# Patient Record
Sex: FEMALE | Race: OTHER | NOT HISPANIC OR LATINO
[De-identification: names, ages, dates, MRNs, and addresses within clinical notes are randomized per-mention and may not be internally consistent; named-entity substitution may affect disease eponyms.]

---

## 2017-01-26 ENCOUNTER — APPOINTMENT (OUTPATIENT)
Dept: PULMONOLOGY | Facility: CLINIC | Age: 42
End: 2017-01-26

## 2017-01-26 VITALS
HEART RATE: 89 BPM | DIASTOLIC BLOOD PRESSURE: 70 MMHG | OXYGEN SATURATION: 100 % | SYSTOLIC BLOOD PRESSURE: 126 MMHG | WEIGHT: 261 LBS | RESPIRATION RATE: 16 BRPM | BODY MASS INDEX: 44.56 KG/M2 | HEIGHT: 64 IN

## 2017-04-26 ENCOUNTER — APPOINTMENT (OUTPATIENT)
Dept: PULMONOLOGY | Facility: CLINIC | Age: 42
End: 2017-04-26

## 2017-06-29 ENCOUNTER — APPOINTMENT (OUTPATIENT)
Dept: PULMONOLOGY | Facility: CLINIC | Age: 42
End: 2017-06-29

## 2017-06-29 VITALS
DIASTOLIC BLOOD PRESSURE: 80 MMHG | HEART RATE: 86 BPM | HEIGHT: 64 IN | SYSTOLIC BLOOD PRESSURE: 100 MMHG | RESPIRATION RATE: 16 BRPM | BODY MASS INDEX: 41.15 KG/M2 | OXYGEN SATURATION: 99 % | WEIGHT: 241 LBS

## 2017-06-29 DIAGNOSIS — R73.09 OTHER ABNORMAL GLUCOSE: ICD-10-CM

## 2017-06-29 RX ORDER — CLONAZEPAM 0.5 MG/1
0.5 TABLET ORAL
Qty: 60 | Refills: 0 | Status: DISCONTINUED | COMMUNITY
Start: 2017-03-02

## 2017-09-17 ENCOUNTER — TRANSCRIPTION ENCOUNTER (OUTPATIENT)
Age: 42
End: 2017-09-17

## 2017-09-22 ENCOUNTER — APPOINTMENT (OUTPATIENT)
Dept: PULMONOLOGY | Facility: CLINIC | Age: 42
End: 2017-09-22
Payer: COMMERCIAL

## 2017-09-22 VITALS
OXYGEN SATURATION: 99 % | HEIGHT: 64 IN | HEART RATE: 90 BPM | SYSTOLIC BLOOD PRESSURE: 123 MMHG | DIASTOLIC BLOOD PRESSURE: 85 MMHG | RESPIRATION RATE: 16 BRPM | BODY MASS INDEX: 41.15 KG/M2 | WEIGHT: 241 LBS

## 2017-09-22 DIAGNOSIS — R59.1 GENERALIZED ENLARGED LYMPH NODES: ICD-10-CM

## 2017-09-22 PROCEDURE — 94010 BREATHING CAPACITY TEST: CPT

## 2017-09-22 PROCEDURE — 99214 OFFICE O/P EST MOD 30 MIN: CPT | Mod: 25

## 2018-01-15 ENCOUNTER — APPOINTMENT (OUTPATIENT)
Dept: PULMONOLOGY | Facility: CLINIC | Age: 43
End: 2018-01-15
Payer: COMMERCIAL

## 2018-01-15 VITALS — OXYGEN SATURATION: 100 % | HEART RATE: 84 BPM | SYSTOLIC BLOOD PRESSURE: 135 MMHG | DIASTOLIC BLOOD PRESSURE: 86 MMHG

## 2018-01-15 PROCEDURE — 99214 OFFICE O/P EST MOD 30 MIN: CPT | Mod: 25

## 2018-01-15 PROCEDURE — 94010 BREATHING CAPACITY TEST: CPT

## 2018-01-15 RX ORDER — AZITHROMYCIN 500 MG/1
500 TABLET, FILM COATED ORAL
Qty: 5 | Refills: 0 | Status: DISCONTINUED | COMMUNITY
Start: 2017-12-20 | End: 2018-01-15

## 2018-01-15 RX ORDER — AMOXICILLIN AND CLAVULANATE POTASSIUM 500; 125 MG/1; MG/1
500-125 TABLET, FILM COATED ORAL
Qty: 28 | Refills: 0 | Status: DISCONTINUED | COMMUNITY
Start: 2017-09-22 | End: 2018-01-15

## 2018-01-15 RX ORDER — FLUCONAZOLE 100 MG/1
100 TABLET ORAL
Qty: 14 | Refills: 0 | Status: DISCONTINUED | COMMUNITY
Start: 2017-09-22 | End: 2018-01-15

## 2018-07-05 ENCOUNTER — APPOINTMENT (OUTPATIENT)
Dept: PULMONOLOGY | Facility: CLINIC | Age: 43
End: 2018-07-05
Payer: COMMERCIAL

## 2018-07-05 ENCOUNTER — NON-APPOINTMENT (OUTPATIENT)
Age: 43
End: 2018-07-05

## 2018-07-05 VITALS
BODY MASS INDEX: 40.97 KG/M2 | HEIGHT: 64 IN | HEART RATE: 86 BPM | OXYGEN SATURATION: 100 % | WEIGHT: 240 LBS | SYSTOLIC BLOOD PRESSURE: 130 MMHG | RESPIRATION RATE: 16 BRPM | DIASTOLIC BLOOD PRESSURE: 80 MMHG

## 2018-07-05 PROCEDURE — 94010 BREATHING CAPACITY TEST: CPT

## 2018-07-05 PROCEDURE — 99214 OFFICE O/P EST MOD 30 MIN: CPT | Mod: 25

## 2018-08-02 ENCOUNTER — OUTPATIENT (OUTPATIENT)
Dept: OUTPATIENT SERVICES | Facility: HOSPITAL | Age: 43
LOS: 1 days | End: 2018-08-02
Payer: COMMERCIAL

## 2018-08-02 ENCOUNTER — RESULT REVIEW (OUTPATIENT)
Age: 43
End: 2018-08-02

## 2018-08-02 DIAGNOSIS — K21.9 GASTRO-ESOPHAGEAL REFLUX DISEASE WITHOUT ESOPHAGITIS: ICD-10-CM

## 2018-08-02 PROCEDURE — 88305 TISSUE EXAM BY PATHOLOGIST: CPT | Mod: 26

## 2018-08-02 PROCEDURE — 88312 SPECIAL STAINS GROUP 1: CPT | Mod: 26

## 2018-08-02 PROCEDURE — 88305 TISSUE EXAM BY PATHOLOGIST: CPT

## 2018-08-02 PROCEDURE — 43239 EGD BIOPSY SINGLE/MULTIPLE: CPT

## 2018-08-02 PROCEDURE — 88312 SPECIAL STAINS GROUP 1: CPT

## 2018-08-03 LAB — SURGICAL PATHOLOGY STUDY: SIGNIFICANT CHANGE UP

## 2018-12-28 ENCOUNTER — RX RENEWAL (OUTPATIENT)
Age: 43
End: 2018-12-28

## 2018-12-31 ENCOUNTER — RX RENEWAL (OUTPATIENT)
Age: 43
End: 2018-12-31

## 2019-01-02 ENCOUNTER — RX RENEWAL (OUTPATIENT)
Age: 44
End: 2019-01-02

## 2019-01-08 ENCOUNTER — APPOINTMENT (OUTPATIENT)
Dept: INTERVENTIONAL RADIOLOGY/VASCULAR | Facility: CLINIC | Age: 44
End: 2019-01-08
Payer: COMMERCIAL

## 2019-01-08 VITALS
BODY MASS INDEX: 40.12 KG/M2 | DIASTOLIC BLOOD PRESSURE: 66 MMHG | OXYGEN SATURATION: 99 % | HEIGHT: 64 IN | WEIGHT: 235 LBS | HEART RATE: 85 BPM | SYSTOLIC BLOOD PRESSURE: 129 MMHG | TEMPERATURE: 98.6 F

## 2019-01-08 DIAGNOSIS — Z78.9 OTHER SPECIFIED HEALTH STATUS: ICD-10-CM

## 2019-01-08 PROCEDURE — 99205 OFFICE O/P NEW HI 60 MIN: CPT

## 2019-01-08 RX ORDER — BECLOMETHASONE DIPROPIONATE HFA 80 UG/1
80 AEROSOL, METERED RESPIRATORY (INHALATION) TWICE DAILY
Qty: 3 | Refills: 1 | Status: DISCONTINUED | COMMUNITY
Start: 2018-12-28 | End: 2019-01-08

## 2019-01-08 RX ORDER — PREDNISONE 10 MG/1
10 TABLET ORAL
Qty: 100 | Refills: 1 | Status: DISCONTINUED | COMMUNITY
Start: 2018-12-28 | End: 2019-01-08

## 2019-01-08 RX ORDER — BECLOMETHASONE DIPROPIONATE 40 UG/1
40 AEROSOL, METERED NASAL
Qty: 3 | Refills: 1 | Status: DISCONTINUED | COMMUNITY
Start: 2017-01-26 | End: 2019-01-08

## 2019-01-09 ENCOUNTER — NON-APPOINTMENT (OUTPATIENT)
Age: 44
End: 2019-01-09

## 2019-01-09 ENCOUNTER — APPOINTMENT (OUTPATIENT)
Dept: PULMONOLOGY | Facility: CLINIC | Age: 44
End: 2019-01-09
Payer: COMMERCIAL

## 2019-01-09 VITALS
HEIGHT: 65 IN | WEIGHT: 245 LBS | HEART RATE: 81 BPM | DIASTOLIC BLOOD PRESSURE: 79 MMHG | SYSTOLIC BLOOD PRESSURE: 127 MMHG | BODY MASS INDEX: 40.82 KG/M2 | RESPIRATION RATE: 17 BRPM | OXYGEN SATURATION: 99 %

## 2019-01-09 PROCEDURE — 94010 BREATHING CAPACITY TEST: CPT

## 2019-01-09 PROCEDURE — 99214 OFFICE O/P EST MOD 30 MIN: CPT | Mod: 25

## 2019-01-09 NOTE — HISTORY OF PRESENT ILLNESS
[FreeTextEntry1] : Ms. Edwards is a 43 year old female with a history of allergic rhinitis, severe persistent asthma, GERD, eosinophilia, nocturia, sleep apnea, SOB and vitamin D deficiency presenting to the office today for a follow up visit. Her chief complaint is sinus issues. \par -she states that she is just now getting over a cold. she has been sweating from the steroids \par -she notes that her heartburn has been under control\par -she reports that the steroids have been disturbing her sleep schedule, and she has been very fatigued as a result\par -she notes that before getting sick, she had been feeling generally well\par -she reports that her weight has gone up slightly as a result of the steroids\par -she notes that she has had a constant headache in her sinus area\par -she has been using her CPAP regularly and benefitting from it \par -her sinuses have been hurting\par -she denies any headaches, nausea, vomiting, fever, chills, chest pain, chest pressure, diarrhea, constipation, dysphagia, dizziness, leg swelling, leg pain, itchy eyes, itchy ears, heartburn, reflux, or sour taste in the mouth.

## 2019-01-09 NOTE — PHYSICAL EXAM

## 2019-01-09 NOTE — PROCEDURE
[FreeTextEntry1] : PFT - spi reveals moderate restrictive dysfunction ; FEV1 is 2.13 which is 69% of predicted, normal flow volume loop \par \par Blood work (12/14/18) \par eosinophils - 8\par Hb - 8.8\par hematocrit - 29

## 2019-01-09 NOTE — REASON FOR VISIT
[Follow-Up] : a follow-up visit [FreeTextEntry1] : allergic rhinitis, severe persistent asthma, GERD, eosinophilia, nocturia, sleep apnea, SOB and vitamin D deficiency

## 2019-01-09 NOTE — ADDENDUM
[FreeTextEntry1] : All medical record entries made by miguel Guillen were at Dr. Rodrigue Elizabeth's, direction and personally dictated by me on 01/09/2019. I have reviewed the chart and agree that the record accurately reflects my personal performance of the history, physical exam, assessment and plan. I have also personally directed, reviewed, and agree with the discharge instructions.

## 2019-01-09 NOTE — ASSESSMENT
[FreeTextEntry1] : Ms. Edwards is a 43 year old female with a history of asthma / allergies / GERD / OSAS. \par \par Her SOB is due to: \par -anemia\par -asthma\par -obesity\par -poor mechanics of breathing \par -out of shape over weight\par \par Problem 1: Severe persistent asthma\par -controlled at this time \par -continue Symbicort 160 (2 puffs BID) \par -continue Qvar 80 (2 puffs BID) \par -continue Singulair 10 mg at bedtime \par -add DuoNeb via Hand held nebulizer, Q6H \par \par Asthma is believed to be caused by inherited (genetic) and environmental factor, but its exact cause is unknown. Asthma may be triggered by allergens, lung infections, or irritants in the air. Asthma triggers are different for each person \par -Inhaler technique reviewed as well as oral hygiene techniques reviewed with patient. Avoidance of cold air, extremes of temperature, rescue inhaler should be used before exercise. Order of medication reviewed with patient. Recommended use of a cool mist humidifier in the bedroom \par \par problem 2: overweight\par Weight loss, exercise, and diet control were discussed and are highly encouraged. Treatment options were given such as, aqua therapy, and contacting a nutritionist. Recommended to use the elliptical, stationary bike, less use of treadmill. Mindful eating was explained to the patient Obesity is associated with worsening asthma, shortness of breath, and potential for cardiac disease, diabetes, and other underlying medical conditions. \par \par problem 3: poor breathing mechanics\par -Proper breathing techniques were reviewed with an emphasis of exhalation. Patient instructed to breath in for 1 second and out for four seconds. Patient was encouraged to not talk while walking.\par \par Problem 4: Anemia \par -Hematin Anemia guard QD\par \par Problem 5: allergies/sinus\par -continue Qnasl 1 sniff BID \par -recommended to use "Navage" nasal rinse device \par -continue Astelin 0.15 1 sniff BID\par \par Environmental measures for allergies were encouraged including mattress and pillow cover, air purifier, and environmental controls.\par \par Problem 6:  GERD\par -continue Nexium 40 mg QD at breakfast \par -Rule of 2s: avoid eating too much, eating too late, eating too spicy, eating two hours before bed\par -Things to avoid including overeating, spicy foods, tight clothing, eating within three hours of bed, this list is not all inclusive. \par -For treatment of reflux, possible options discussed including diet control, H2 blockers, PPIs, as well as coating motility agents discussed as treatment options. Timing of meals and proximity of last meal to sleep were discussed. If symptoms persist, a formal gastrointestinal evaluation is needed. \par \par Problem 7: low vitamin D\par -Continue on supplemental vitamin D \par \par Problem 8: OSAS\par -continue to use CPAP every night, benefiting well \par - -Discussed the risks/associations with coronary artery disease, atrial fibrillation, arrhythmia, memory loss, issues with concentration, stroke risk, hypertension, nocturia, chronic reflux/Ferrara’s esophagus some but not all inclusive. Treatment options discussed including CPAP/BiPAP machine, oral appliance, ProVent therapy, Oxy-Aid by Respitec, new technologies, or positional sleep.\par \par problem 9: Blood work\par -resend for blood work\par -Vitamin D \par -Eosinophil level (-)\par -IgG subsets\par -Quant immunoglobulins\par -Strep/ pneumonia titers \par -iron level\par -Thyroid levels \par -checking for candidacy for Nucala injections\par \par problem 10: elevated eosinophil level (not elevated) \par -candidate for Nucala or Faserna\par \par -The safety and efficacy of Nucala was established in three double-blind, randomized, placebo-controlled trials in patients with severe asthma. Compared to a placebo, patients with severe asthma receiving Nucala had fewer exacerbation requiring hospitalization and/or emergency department visits, and a longer time to first exacerbation. In addition, patients with severe asthma receiving Nucala experienced greater reductions in their daily maintenance oral corticosteroid dose, while maintaining asthma control compared with patients receiving placebo. Treatment with Nucala did not result in a significant improvement in lung function, as measured by the volume of air exhaled by patients in one second. The most common side effects include: headache, injection site reactions, back pain, weakness, and fatigue; hypersensitivity reactions can occur within hours or days including swelling of the face, mouth, and tongue, fainting, dizziness, hives, breathing problems, and rash; herpes zoster infections have occurred. The drug is a monoclonal antibody that inhibits interleukin-5 which helps regular eosinophils, a type of white blood cell that contributes to asthma. The over-production of eosinophils can cause inflammation in the lungs, increasing the frequency of asthma attacks. Patients must also take other medications, including high dose inhaled corticosteroids and at least one additional asthma drug.\par \par problem 11: elevated IgE level\par -failed Xolair\par \par problem 12: health maintenance \par -recommended yearly flu shot after October 15 - 2018\par -recommended strep pneumonia vaccines: Prevnar-13 vaccine, followed by Pneumo vaccine 23 one year following\par -recommended early intervention for URIs\par -recommended regular osteoporosis evaluations\par -recommended early dermatological evaluations\par -recommended after the age of 50 to the age of 70, colonoscopy every 5 years \par Recommended that she see a Psychologist to help her with her get back to "herself" \par \par F/u in 3-4 months \par pt is encouraged to call or fax the office with any questions or concerns. \par Pt is to exercise and diet to promote a healthy weight. \par Explained to the pt in full detail with demonstrations how to use the inhalers and inhaler hygiene.

## 2019-01-16 ENCOUNTER — MEDICATION RENEWAL (OUTPATIENT)
Age: 44
End: 2019-01-16

## 2019-03-18 ENCOUNTER — APPOINTMENT (OUTPATIENT)
Dept: PULMONOLOGY | Facility: CLINIC | Age: 44
End: 2019-03-18
Payer: COMMERCIAL

## 2019-03-18 VITALS
WEIGHT: 241 LBS | RESPIRATION RATE: 17 BRPM | BODY MASS INDEX: 41.15 KG/M2 | HEART RATE: 90 BPM | HEIGHT: 64 IN | TEMPERATURE: 98.9 F | SYSTOLIC BLOOD PRESSURE: 136 MMHG | DIASTOLIC BLOOD PRESSURE: 72 MMHG | OXYGEN SATURATION: 98 %

## 2019-03-18 PROCEDURE — 99214 OFFICE O/P EST MOD 30 MIN: CPT

## 2019-03-18 RX ORDER — FLUTICASONE PROPIONATE 50 UG/1
50 SPRAY, METERED NASAL TWICE DAILY
Qty: 1 | Refills: 1 | Status: ACTIVE | COMMUNITY
Start: 2019-03-18 | End: 1900-01-01

## 2019-03-18 NOTE — PHYSICAL EXAM
[General Appearance - Well Developed] : well developed [Normal Appearance] : normal appearance [Well Groomed] : well groomed [General Appearance - Well Nourished] : well nourished [No Deformities] : no deformities [General Appearance - In No Acute Distress] : no acute distress [Eyelids - No Xanthelasma] : the eyelids demonstrated no xanthelasmas [Normal Conjunctiva] : the conjunctiva exhibited no abnormalities [III] : III [Neck Cervical Mass (___cm)] : no neck mass was observed [Neck Appearance] : the appearance of the neck was normal [Heart Rate And Rhythm] : heart rate and rhythm were normal [Heart Sounds] : normal S1 and S2 [Murmurs] : no murmurs present [Exaggerated Use Of Accessory Muscles For Inspiration] : no accessory muscle use [Respiration, Rhythm And Depth] : normal respiratory rhythm and effort [Abdomen Soft] : soft [Abdomen Tenderness] : non-tender [Abdomen Mass (___ Cm)] : no abdominal mass palpated [Gait - Sufficient For Exercise Testing] : the gait was sufficient for exercise testing [Abnormal Walk] : normal gait [Nail Clubbing] : no clubbing of the fingernails [Cyanosis, Localized] : no localized cyanosis [Petechial Hemorrhages (___cm)] : no petechial hemorrhages [] : no rash [Skin Color & Pigmentation] : normal skin color and pigmentation [Skin Lesions] : no skin lesions [No Venous Stasis] : no venous stasis [No Xanthoma] : no  xanthoma was observed [No Skin Ulcers] : no skin ulcer [Sensation] : the sensory exam was normal to light touch and pinprick [Deep Tendon Reflexes (DTR)] : deep tendon reflexes were 2+ and symmetric [Oriented To Time, Place, And Person] : oriented to person, place, and time [No Focal Deficits] : no focal deficits [Impaired Insight] : insight and judgment were intact [Affect] : the affect was normal [Erythema] : erythema of the pharynx [Arterial Pulses Normal] : the arterial pulses were normal [FreeTextEntry1] : I:E ratio 1:3; BL expiratory wheezes throughout, cannot take a deep breath without coughing [Mood] : the mood was normal

## 2019-03-18 NOTE — REASON FOR VISIT
[Acute] : an acute visit [Asthma] : asthma [Shortness of Breath] : shortness of Breath [Cough] : cough [Wheezing] : wheezing [FreeTextEntry2] : voice loss

## 2019-03-18 NOTE — HISTORY OF PRESENT ILLNESS
[FreeTextEntry1] : Ms. Edwards is a 43 year old female with a history of allergic rhinitis, severe persistent asthma, GERD, eosinophilia, nocturia, sleep apnea, SOB and vitamin D deficiency presenting to the office today for an acute visit for cough, voice loss, shortness of breath and fatigue. \par \par Pt reports getting sick last Tuesday with chills, headache and head congestion. She was tested for the flu at an urgent care and it was negative.  By Friday last week she lost her voice completely. Today her voice is just minimally back after voice rest, hydration, and recommendations by Dr. Elizabeth. \par \par Additionally she reports severe cough fits- dry, chest pressure/tightness, fatigue, decreased appetite, continued chills, throat pain/soreness, throat fullness, throat dryness, wheezing, sinus headache, sinus pressure, poor sleep due to coughing fits. \par \par She is currently on symbicort. She added in duoneb treatments and is not getting any relief with that.  She is using astelin for her sinus pressure.

## 2019-03-18 NOTE — REVIEW OF SYSTEMS
[Negative] : Pulmonary Hypertension [Chills] : chills [Poor Appetite] : poor appetite [Fatigue] : fatigue [Nasal Congestion] : nasal congestion [Sinus Problems] : sinus problems [Sore Throat] : sore throat [Dry Mouth] : dry mouth [Cough] : cough [Dyspnea] : dyspnea [Chest Tightness] : chest tightness [Wheezing] : wheezing [Headache] : headache [Difficulty Initiating Sleep] : difficulty falling asleep [As Noted in HPI] : as noted in HPI [Difficulty Maintaining Sleep] : difficulty maintaining sleep [Nonrestorative Sleep] : nonrestorative sleep [Fever] : no fever [Sputum] : not coughing up ~M sputum [de-identified] : pt with poor sleep due to coughing fits

## 2019-04-17 ENCOUNTER — APPOINTMENT (OUTPATIENT)
Dept: PULMONOLOGY | Facility: CLINIC | Age: 44
End: 2019-04-17
Payer: COMMERCIAL

## 2019-04-17 VITALS
DIASTOLIC BLOOD PRESSURE: 80 MMHG | BODY MASS INDEX: 41.48 KG/M2 | RESPIRATION RATE: 16 BRPM | HEART RATE: 84 BPM | OXYGEN SATURATION: 99 % | SYSTOLIC BLOOD PRESSURE: 120 MMHG | HEIGHT: 64 IN | WEIGHT: 243 LBS

## 2019-04-17 PROCEDURE — 94010 BREATHING CAPACITY TEST: CPT

## 2019-04-17 PROCEDURE — 94618 PULMONARY STRESS TESTING: CPT

## 2019-04-17 PROCEDURE — 94729 DIFFUSING CAPACITY: CPT

## 2019-04-17 PROCEDURE — 99214 OFFICE O/P EST MOD 30 MIN: CPT | Mod: 25

## 2019-04-17 NOTE — PHYSICAL EXAM
[General Appearance - Well Developed] : well developed [Normal Appearance] : normal appearance [Well Groomed] : well groomed [No Deformities] : no deformities [General Appearance - Well Nourished] : well nourished [Normal Conjunctiva] : the conjunctiva exhibited no abnormalities [General Appearance - In No Acute Distress] : no acute distress [III] : III [Eyelids - No Xanthelasma] : the eyelids demonstrated no xanthelasmas [Normal Oropharynx] : normal oropharynx [Neck Appearance] : the appearance of the neck was normal [Jugular Venous Distention Increased] : there was no jugular-venous distention [Neck Cervical Mass (___cm)] : no neck mass was observed [Thyroid Diffuse Enlargement] : the thyroid was not enlarged [Heart Rate And Rhythm] : heart rate and rhythm were normal [Heart Sounds] : normal S1 and S2 [Thyroid Nodule] : there were no palpable thyroid nodules [Murmurs] : no murmurs present [Exaggerated Use Of Accessory Muscles For Inspiration] : no accessory muscle use [Respiration, Rhythm And Depth] : normal respiratory rhythm and effort [Abdomen Tenderness] : non-tender [Abdomen Mass (___ Cm)] : no abdominal mass palpated [Abdomen Soft] : soft [Abnormal Walk] : normal gait [Gait - Sufficient For Exercise Testing] : the gait was sufficient for exercise testing [Nail Clubbing] : no clubbing of the fingernails [Cyanosis, Localized] : no localized cyanosis [Petechial Hemorrhages (___cm)] : no petechial hemorrhages [Skin Color & Pigmentation] : normal skin color and pigmentation [] : no rash [No Venous Stasis] : no venous stasis [Skin Lesions] : no skin lesions [No Xanthoma] : no  xanthoma was observed [No Skin Ulcers] : no skin ulcer [Deep Tendon Reflexes (DTR)] : deep tendon reflexes were 2+ and symmetric [Sensation] : the sensory exam was normal to light touch and pinprick [No Focal Deficits] : no focal deficits [Impaired Insight] : insight and judgment were intact [Affect] : the affect was normal [Oriented To Time, Place, And Person] : oriented to person, place, and time [FreeTextEntry1] : I:E ratio 1:3; mild forced expiratory wheeze

## 2019-04-17 NOTE — PROCEDURE
[FreeTextEntry1] : PFT- spi reveals normal flows; FEV1 was 2.16L which is 93% of predicted; normal lung volumes; mild to moderate reduction in diffusion at 16.4, which is 57% of predicted; normal flow volume loop \par \par Patient completed a 6-minute walk/exercise study in which the Lowest Pulse Ox was 92%; there was evidence of very slight dyspnea. The patient walked 30 laps or 489 meters \par \par FENO was 6; a normal value being less than 25\par Fractional exhaled nitric oxide (FENO) is regarded as a simple, noninvasive method for assessing eosinophilic airway inflammation. Produced by a variety of cells within the lung, nitric oxide (NO) concentrations are generally low in healthy individuals. However, high concentrations of NO appear to be involved in nonspecific host defense mechanisms and chronic inflammatory diseases such as asthma. The American Thor[employer] Society (ATS) therefore has recommended using FENO to aid in the diagnosis and monitoring of eosinophilic airway inflammation and asthma, and for identifying steroid responsive individuals whose chronic respiratory symptoms may be caused by airway inflammation.

## 2019-04-17 NOTE — ASSESSMENT
[FreeTextEntry1] : Ms. Edwards is a 43 year old female with a history of asthma, allergies, GERD, OSAS, and obsity. She presents to the office for pulmonary pre-op clearance for gynecological surgery. \par \par Her SOB is due to: \par -anemia\par -asthma\par -obesity\par -poor mechanics of breathing \par -out of shape over weight\par \par Problem 1: Severe persistent asthma\par -controlled at this time \par -continue Symbicort 160 (2 puffs BID) \par -continue Qvar 80 (2 puffs BID) \par -continue Singulair 10 mg at bedtime \par - Continue DuoNeb via Hand held nebulizer prn \par \par Asthma is believed to be caused by inherited (genetic) and environmental factor, but its exact cause is unknown. Asthma may be triggered by allergens, lung infections, or irritants in the air. Asthma triggers are different for each person \par -Inhaler technique reviewed as well as oral hygiene techniques reviewed with patient. Avoidance of cold air, extremes of temperature, rescue inhaler should be used before exercise. Order of medication reviewed with patient. Recommended use of a cool mist humidifier in the bedroom \par \par problem 2: overweight\par Weight loss, exercise, and diet control were discussed and are highly encouraged. Treatment options were given such as, aqua therapy, and contacting a nutritionist. Recommended to use the elliptical, stationary bike, less use of treadmill. Mindful eating was explained to the patient Obesity is associated with worsening asthma, shortness of breath, and potential for cardiac disease, diabetes, and other underlying medical conditions. \par \par problem 3: poor breathing mechanics\par -Proper breathing techniques were reviewed with an emphasis of exhalation. Patient instructed to breath in for 1 second and out for four seconds. Patient was encouraged to not talk while walking.\par \par Problem 4: Anemia \par -Hematin Anemia guard QD\par \par Problem 5: allergies/sinus\par -continue Qnasl 1 sniff BID \par -recommended to use "Navage" nasal rinse device \par -continue Astelin 0.15 1 sniff BID\par \par Environmental measures for allergies were encouraged including mattress and pillow cover, air purifier, and environmental controls.\par \par Problem 6:  GERD\par -continue Nexium 40 mg QD at breakfast \par -Rule of 2s: avoid eating too much, eating too late, eating too spicy, eating two hours before bed\par -Things to avoid including overeating, spicy foods, tight clothing, eating within three hours of bed, this list is not all inclusive. \par -For treatment of reflux, possible options discussed including diet control, H2 blockers, PPIs, as well as coating motility agents discussed as treatment options. Timing of meals and proximity of last meal to sleep were discussed. If symptoms persist, a formal gastrointestinal evaluation is needed. \par \par Problem 7: low vitamin D\par -Continue on supplemental vitamin D \par \par Problem 8: OSAS\par -continue to use CPAP every night, benefiting well \par - -Discussed the risks/associations with coronary artery disease, atrial fibrillation, arrhythmia, memory loss, issues with concentration, stroke risk, hypertension, nocturia, chronic reflux/Ferrara’s esophagus some but not all inclusive. Treatment options discussed including CPAP/BiPAP machine, oral appliance, ProVent therapy, Oxy-Aid by Respitec, new technologies, or positional sleep.\par \par problem 9: Pulmonary Pre-Op clearance for GYN surgery\par -at this point in time there are no absolute pulmonary contraindications to go forward with the planned procedure  \par -at the time of surgery s/he should have optimal pain control, incentive spirometry, early ambulation, DVT and GI prophylaxis. \par \par problem 10: elevated eosinophil level (not elevated) \par -candidate for Nucala or Faserna\par \par -The safety and efficacy of Nucala was established in three double-blind, randomized, placebo-controlled trials in patients with severe asthma. Compared to a placebo, patients with severe asthma receiving Nucala had fewer exacerbation requiring hospitalization and/or emergency department visits, and a longer time to first exacerbation. In addition, patients with severe asthma receiving Nucala experienced greater reductions in their daily maintenance oral corticosteroid dose, while maintaining asthma control compared with patients receiving placebo. Treatment with Nucala did not result in a significant improvement in lung function, as measured by the volume of air exhaled by patients in one second. The most common side effects include: headache, injection site reactions, back pain, weakness, and fatigue; hypersensitivity reactions can occur within hours or days including swelling of the face, mouth, and tongue, fainting, dizziness, hives, breathing problems, and rash; herpes zoster infections have occurred. The drug is a monoclonal antibody that inhibits interleukin-5 which helps regular eosinophils, a type of white blood cell that contributes to asthma. The over-production of eosinophils can cause inflammation in the lungs, increasing the frequency of asthma attacks. Patients must also take other medications, including high dose inhaled corticosteroids and at least one additional asthma drug.\par \par problem 11: elevated IgE level\par -failed Xolair\par \par problem 12: health maintenance \par -recommended yearly flu shot after October 15 - 2018\par -recommended strep pneumonia vaccines: Prevnar-13 vaccine, followed by Pneumo vaccine 23 one year following\par -recommended early intervention for URIs\par -recommended regular osteoporosis evaluations\par -recommended early dermatological evaluations\par -recommended after the age of 50 to the age of 70, colonoscopy every 5 years \par Recommended that she see a Psychologist to help her with her get back to "herself" \par \par F/u in 3-4 months \par pt is encouraged to call or fax the office with any questions or concerns. \par Pt is to exercise and diet to promote a healthy weight. \par Explained to the pt in full detail with demonstrations how to use the inhalers and inhaler hygiene.

## 2019-04-17 NOTE — HISTORY OF PRESENT ILLNESS
[FreeTextEntry1] : Ms. Edwards is a 43 year old female with a history of allergic rhinitis, severe persistent asthma, GERD, eosinophilia, nocturia, sleep apnea, SOB and vitamin D deficiency presenting to the office today for a follow up visit. Her chief complaint is gynecological issues. \par - She comes in following a recent URI for which she presented to the office and saw Alla Carrillo. She was prescribed a course of treatment (abx and steroid) and is currently feeling improved. \par - She describes her sleeping as okay. \par - She is using her CPAP regularly (7 hrs per night)\par - She states that her sinuses are okay\par - She believes that her flairs this year have been due to exposures at work. She has since changed her working environment\par - She feels that she has been ill too many times over the past year \par - She has gained about 7 pounds since being on prednisone\par - She currently feels okay with regards to allergies.

## 2019-04-19 ENCOUNTER — OUTPATIENT (OUTPATIENT)
Dept: OUTPATIENT SERVICES | Facility: HOSPITAL | Age: 44
LOS: 1 days | End: 2019-04-19
Payer: COMMERCIAL

## 2019-04-19 VITALS
TEMPERATURE: 99 F | WEIGHT: 244.05 LBS | SYSTOLIC BLOOD PRESSURE: 128 MMHG | OXYGEN SATURATION: 99 % | DIASTOLIC BLOOD PRESSURE: 88 MMHG | HEART RATE: 80 BPM | RESPIRATION RATE: 18 BRPM | HEIGHT: 65 IN

## 2019-04-19 DIAGNOSIS — N93.9 ABNORMAL UTERINE AND VAGINAL BLEEDING, UNSPECIFIED: ICD-10-CM

## 2019-04-19 DIAGNOSIS — Z18.9 RETAINED FOREIGN BODY FRAGMENTS, UNSPECIFIED MATERIAL: ICD-10-CM

## 2019-04-19 DIAGNOSIS — Z01.818 ENCOUNTER FOR OTHER PREPROCEDURAL EXAMINATION: ICD-10-CM

## 2019-04-19 DIAGNOSIS — G47.33 OBSTRUCTIVE SLEEP APNEA (ADULT) (PEDIATRIC): ICD-10-CM

## 2019-04-19 DIAGNOSIS — Z98.890 OTHER SPECIFIED POSTPROCEDURAL STATES: Chronic | ICD-10-CM

## 2019-04-19 LAB
ANION GAP SERPL CALC-SCNC: 13 MMOL/L — SIGNIFICANT CHANGE UP (ref 5–17)
BUN SERPL-MCNC: 9 MG/DL — SIGNIFICANT CHANGE UP (ref 7–23)
CALCIUM SERPL-MCNC: 9.4 MG/DL — SIGNIFICANT CHANGE UP (ref 8.4–10.5)
CHLORIDE SERPL-SCNC: 103 MMOL/L — SIGNIFICANT CHANGE UP (ref 96–108)
CO2 SERPL-SCNC: 23 MMOL/L — SIGNIFICANT CHANGE UP (ref 22–31)
CREAT SERPL-MCNC: 0.68 MG/DL — SIGNIFICANT CHANGE UP (ref 0.5–1.3)
GLUCOSE SERPL-MCNC: 88 MG/DL — SIGNIFICANT CHANGE UP (ref 70–99)
HCT VFR BLD CALC: 35.2 % — SIGNIFICANT CHANGE UP (ref 34.5–45)
HGB BLD-MCNC: 10.3 G/DL — LOW (ref 11.5–15.5)
MCHC RBC-ENTMCNC: 24.1 PG — LOW (ref 27–34)
MCHC RBC-ENTMCNC: 29.3 GM/DL — LOW (ref 32–36)
MCV RBC AUTO: 82.4 FL — SIGNIFICANT CHANGE UP (ref 80–100)
PLATELET # BLD AUTO: 358 K/UL — SIGNIFICANT CHANGE UP (ref 150–400)
POTASSIUM SERPL-MCNC: 3.8 MMOL/L — SIGNIFICANT CHANGE UP (ref 3.5–5.3)
POTASSIUM SERPL-SCNC: 3.8 MMOL/L — SIGNIFICANT CHANGE UP (ref 3.5–5.3)
RBC # BLD: 4.27 M/UL — SIGNIFICANT CHANGE UP (ref 3.8–5.2)
RBC # FLD: 19.7 % — HIGH (ref 10.3–14.5)
SODIUM SERPL-SCNC: 139 MMOL/L — SIGNIFICANT CHANGE UP (ref 135–145)
WBC # BLD: 6.11 K/UL — SIGNIFICANT CHANGE UP (ref 3.8–10.5)
WBC # FLD AUTO: 6.11 K/UL — SIGNIFICANT CHANGE UP (ref 3.8–10.5)

## 2019-04-19 PROCEDURE — G0463: CPT

## 2019-04-19 PROCEDURE — 80048 BASIC METABOLIC PNL TOTAL CA: CPT

## 2019-04-19 PROCEDURE — 85027 COMPLETE CBC AUTOMATED: CPT

## 2019-04-19 RX ORDER — SODIUM CHLORIDE 9 MG/ML
3 INJECTION INTRAMUSCULAR; INTRAVENOUS; SUBCUTANEOUS EVERY 8 HOURS
Qty: 0 | Refills: 0 | Status: DISCONTINUED | OUTPATIENT
Start: 2019-04-25 | End: 2019-05-10

## 2019-04-19 RX ORDER — LIDOCAINE HCL 20 MG/ML
0.2 VIAL (ML) INJECTION ONCE
Qty: 0 | Refills: 0 | Status: DISCONTINUED | OUTPATIENT
Start: 2019-04-25 | End: 2019-05-10

## 2019-04-19 NOTE — H&P PST ADULT - NSICDXPROBLEM_GEN_ALL_CORE_FT
PROBLEM DIAGNOSES  Problem: Abnormal vaginal bleeding  Assessment and Plan: D+C, operative hysteroscopy    Problem: TEODORA on CPAP  Assessment and Plan: Maintain TEODORA precautions PROBLEM DIAGNOSES  Problem: Abnormal vaginal bleeding  Assessment and Plan: Scheduled for D&C, operative hysteroscopy.    Problem: Asthma  Assessment and Plan: Patient seen by Pulmonary 4/17, evaluation in  [facility]    Problem: TEODORA on CPAP  Assessment and Plan: TEODORA precautions and OR notified

## 2019-04-19 NOTE — H&P PST ADULT - NSICDXFAMILYHX_GEN_ALL_CORE_FT
FAMILY HISTORY:  Father  Still living? Unknown  Family history of hypertension, Age at diagnosis: Age Unknown    Mother  Still living? Unknown  Family history of asthma, Age at diagnosis: Age Unknown  Family history of hypertension, Age at diagnosis: Age Unknown

## 2019-04-19 NOTE — H&P PST ADULT - HISTORY OF PRESENT ILLNESS
42 y/o F PMH  0, para 0, endometriosis, uterine fibroids, heavy menstruation with iron deficiency anemia, S/P Mirena IUD placement.  Presents today for D+C, operative hysteroscopy. 42 y/o F PMH chronic, severe asthma, completed a course of steroids within the past week for asthma exacerbation with URI, and is feeling better, has had 3 courses of steroids since November, TEODORA on CPAP,  0, para 0, endometriosis, uterine fibroids, heavy menstruation with iron deficiency anemia, S/P Mirena IUD placement.  Presents today for D+C, operative hysteroscopy.

## 2019-04-22 DIAGNOSIS — J45.909 UNSPECIFIED ASTHMA, UNCOMPLICATED: ICD-10-CM

## 2019-04-24 ENCOUNTER — TRANSCRIPTION ENCOUNTER (OUTPATIENT)
Age: 44
End: 2019-04-24

## 2019-04-24 RX ORDER — OXYCODONE HYDROCHLORIDE 5 MG/1
5 TABLET ORAL ONCE
Qty: 0 | Refills: 0 | Status: DISCONTINUED | OUTPATIENT
Start: 2019-04-25 | End: 2019-04-25

## 2019-04-24 RX ORDER — SODIUM CHLORIDE 9 MG/ML
1000 INJECTION, SOLUTION INTRAVENOUS
Qty: 0 | Refills: 0 | Status: DISCONTINUED | OUTPATIENT
Start: 2019-04-25 | End: 2019-05-10

## 2019-04-24 RX ORDER — ONDANSETRON 8 MG/1
4 TABLET, FILM COATED ORAL ONCE
Qty: 0 | Refills: 0 | Status: DISCONTINUED | OUTPATIENT
Start: 2019-04-25 | End: 2019-05-10

## 2019-04-25 ENCOUNTER — OUTPATIENT (OUTPATIENT)
Dept: OUTPATIENT SERVICES | Facility: HOSPITAL | Age: 44
LOS: 1 days | End: 2019-04-25
Payer: COMMERCIAL

## 2019-04-25 ENCOUNTER — RESULT REVIEW (OUTPATIENT)
Age: 44
End: 2019-04-25

## 2019-04-25 VITALS
HEIGHT: 65 IN | HEART RATE: 80 BPM | TEMPERATURE: 99 F | WEIGHT: 244.05 LBS | OXYGEN SATURATION: 99 % | RESPIRATION RATE: 18 BRPM | DIASTOLIC BLOOD PRESSURE: 88 MMHG | SYSTOLIC BLOOD PRESSURE: 128 MMHG

## 2019-04-25 VITALS
RESPIRATION RATE: 16 BRPM | DIASTOLIC BLOOD PRESSURE: 83 MMHG | SYSTOLIC BLOOD PRESSURE: 141 MMHG | HEART RATE: 86 BPM | OXYGEN SATURATION: 97 %

## 2019-04-25 DIAGNOSIS — G47.33 OBSTRUCTIVE SLEEP APNEA (ADULT) (PEDIATRIC): ICD-10-CM

## 2019-04-25 DIAGNOSIS — Z98.890 OTHER SPECIFIED POSTPROCEDURAL STATES: Chronic | ICD-10-CM

## 2019-04-25 DIAGNOSIS — Z18.9 RETAINED FOREIGN BODY FRAGMENTS, UNSPECIFIED MATERIAL: ICD-10-CM

## 2019-04-25 DIAGNOSIS — N93.9 ABNORMAL UTERINE AND VAGINAL BLEEDING, UNSPECIFIED: ICD-10-CM

## 2019-04-25 DIAGNOSIS — J45.909 UNSPECIFIED ASTHMA, UNCOMPLICATED: ICD-10-CM

## 2019-04-25 PROCEDURE — 88305 TISSUE EXAM BY PATHOLOGIST: CPT

## 2019-04-25 PROCEDURE — 58561 HYSTEROSCOPY REMOVE MYOMA: CPT

## 2019-04-25 PROCEDURE — 88305 TISSUE EXAM BY PATHOLOGIST: CPT | Mod: 26

## 2019-04-25 NOTE — BRIEF OPERATIVE NOTE - NSICDXBRIEFPOSTOP_GEN_ALL_CORE_FT
POST-OP DIAGNOSIS:  Uterine fibroid 25-Apr-2019 09:25:25  Lilibeth Williamson  Uterine polyp 25-Apr-2019 09:25:19  Lilibeth Williamson

## 2019-04-25 NOTE — BRIEF OPERATIVE NOTE - NSICDXBRIEFPROCEDURE_GEN_ALL_CORE_FT
PROCEDURES:  Dilation and curettage of uterus with myomectomy using hysteroscopic tissue removal system with disposable rotating reciprocating cutting blade 25-Apr-2019 09:24:25  Lilibeth Williamson

## 2019-04-25 NOTE — ASU DISCHARGE PLAN (ADULT/PEDIATRIC) - CALL YOUR DOCTOR IF YOU HAVE ANY OF THE FOLLOWING:
Bleeding that does not stop/Nausea and vomiting that does not stop/Unable to urinate/Fever greater than (need to indicate Fahrenheit or Celsius)

## 2019-04-25 NOTE — ASU DISCHARGE PLAN (ADULT/PEDIATRIC) - CARE PROVIDER_API CALL
Kortney Romero)  Obstetrics and Gynecology  [address] [facility], [place] [address]  Phone: [phone]  Fax: [phone]  Follow Up Time:

## 2019-04-25 NOTE — BRIEF OPERATIVE NOTE - OPERATION/FINDINGS
14 weeks size anteverted myomatous uterus. No IUD seen hysteroscopically, on review of MRI from 12/18, intraop, no IUD visualized-it likely expulsed at home. Proliferative endometrium in BRI, submucosal fibroid Type 1 in posterior aspect of uterus, polypoid tissue noted anteriorly on the R side. Both the fibroid and polypoid tissue removed with Symphion device. Endometrial curettage done. 14 weeks size anteverted myomatous uterus. No IUD seen hysteroscopically, on review of MRI from 12/18, intraop, no IUD visualized-it likely expulsed at home. Proliferative endometrium in BRI, submucosal fibroid Type 1 in posterior aspect of uterus, polypoid tissue noted anteriorly on the R side. Both the fibroid and polypoid tissue removed with Symphion device.   Endometrial curettage done.    Job#[id number]

## 2019-04-25 NOTE — BRIEF OPERATIVE NOTE - NSICDXBRIEFPREOP_GEN_ALL_CORE_FT
PRE-OP DIAGNOSIS:  Attempted IUD removal, unsuccessful 25-Apr-2019 09:25:15  Lilibeth Williamson  Uterine polyp 25-Apr-2019 09:24:53  Lilibeth Williamson  Uterine fibroid 25-Apr-2019 09:24:38  Lilibeth Williamson

## 2019-04-30 LAB — SURGICAL PATHOLOGY STUDY: SIGNIFICANT CHANGE UP

## 2019-06-05 PROBLEM — Z87.19 PERSONAL HISTORY OF OTHER DISEASES OF THE DIGESTIVE SYSTEM: Chronic | Status: ACTIVE | Noted: 2019-04-19

## 2019-06-05 PROBLEM — Z86.2 PERSONAL HISTORY OF DISEASES OF THE BLOOD AND BLOOD-FORMING ORGANS AND CERTAIN DISORDERS INVOLVING THE IMMUNE MECHANISM: Chronic | Status: ACTIVE | Noted: 2019-04-19

## 2019-06-05 PROBLEM — G47.33 OBSTRUCTIVE SLEEP APNEA (ADULT) (PEDIATRIC): Chronic | Status: ACTIVE | Noted: 2019-04-19

## 2019-06-05 PROBLEM — E66.9 OBESITY, UNSPECIFIED: Chronic | Status: ACTIVE | Noted: 2019-04-19

## 2019-07-02 ENCOUNTER — APPOINTMENT (OUTPATIENT)
Dept: INTERVENTIONAL RADIOLOGY/VASCULAR | Facility: CLINIC | Age: 44
End: 2019-07-02
Payer: COMMERCIAL

## 2019-07-02 VITALS
HEIGHT: 64 IN | BODY MASS INDEX: 41.66 KG/M2 | DIASTOLIC BLOOD PRESSURE: 94 MMHG | SYSTOLIC BLOOD PRESSURE: 140 MMHG | OXYGEN SATURATION: 98 % | TEMPERATURE: 98.8 F | WEIGHT: 244 LBS | HEART RATE: 86 BPM

## 2019-07-02 DIAGNOSIS — J45.909 UNSPECIFIED ASTHMA, UNCOMPLICATED: ICD-10-CM

## 2019-07-02 DIAGNOSIS — Z01.811 ENCOUNTER FOR PREPROCEDURAL RESPIRATORY EXAMINATION: ICD-10-CM

## 2019-07-02 DIAGNOSIS — Z87.898 PERSONAL HISTORY OF OTHER SPECIFIED CONDITIONS: ICD-10-CM

## 2019-07-02 DIAGNOSIS — D64.9 ANEMIA, UNSPECIFIED: ICD-10-CM

## 2019-07-02 DIAGNOSIS — Z86.19 PERSONAL HISTORY OF OTHER INFECTIOUS AND PARASITIC DISEASES: ICD-10-CM

## 2019-07-02 PROCEDURE — 99215 OFFICE O/P EST HI 40 MIN: CPT

## 2019-07-02 RX ORDER — SODIUM CHLORIDE FOR INHALATION 0.9 %
0.9 VIAL, NEBULIZER (ML) INHALATION
Qty: 1 | Refills: 2 | Status: DISCONTINUED | COMMUNITY
Start: 2019-03-18 | End: 2019-07-02

## 2019-07-02 RX ORDER — DOXYCYCLINE 100 MG/1
100 TABLET, FILM COATED ORAL
Qty: 20 | Refills: 0 | Status: DISCONTINUED | COMMUNITY
Start: 2019-03-18 | End: 2019-07-02

## 2019-07-02 RX ORDER — BECLOMETHASONE DIPROPIONATE HFA 80 UG/1
80 AEROSOL, METERED RESPIRATORY (INHALATION) TWICE DAILY
Qty: 3 | Refills: 1 | Status: DISCONTINUED | COMMUNITY
Start: 2018-12-28 | End: 2019-07-02

## 2019-07-02 RX ORDER — BUDESONIDE AND FORMOTEROL FUMARATE DIHYDRATE 160; 4.5 UG/1; UG/1
160-4.5 AEROSOL RESPIRATORY (INHALATION) TWICE DAILY
Qty: 3 | Refills: 1 | Status: DISCONTINUED | COMMUNITY
Start: 2018-12-28 | End: 2019-07-02

## 2019-07-02 RX ORDER — PREDNISONE 10 MG/1
10 TABLET ORAL
Qty: 60 | Refills: 0 | Status: DISCONTINUED | COMMUNITY
Start: 2019-03-18 | End: 2019-07-02

## 2019-07-02 RX ORDER — HYDROCODONE BITARTRATE AND HOMATROPINE METHYLBROMIDE 5; 1.5 MG/5ML; MG/5ML
5-1.5 SYRUP ORAL
Qty: 240 | Refills: 0 | Status: DISCONTINUED | COMMUNITY
Start: 2019-03-18 | End: 2019-07-02

## 2019-07-02 RX ORDER — PREDNISONE 10 MG/1
10 TABLET ORAL
Qty: 100 | Refills: 0 | Status: DISCONTINUED | COMMUNITY
Start: 2018-12-28 | End: 2019-07-02

## 2019-07-05 ENCOUNTER — OUTPATIENT (OUTPATIENT)
Dept: OUTPATIENT SERVICES | Facility: HOSPITAL | Age: 44
LOS: 1 days | End: 2019-07-05
Payer: COMMERCIAL

## 2019-07-05 VITALS
WEIGHT: 250 LBS | RESPIRATION RATE: 16 BRPM | HEART RATE: 69 BPM | SYSTOLIC BLOOD PRESSURE: 121 MMHG | TEMPERATURE: 98 F | HEIGHT: 64 IN | OXYGEN SATURATION: 98 % | DIASTOLIC BLOOD PRESSURE: 80 MMHG

## 2019-07-05 DIAGNOSIS — Z01.818 ENCOUNTER FOR OTHER PREPROCEDURAL EXAMINATION: ICD-10-CM

## 2019-07-05 DIAGNOSIS — D25.9 LEIOMYOMA OF UTERUS, UNSPECIFIED: ICD-10-CM

## 2019-07-05 DIAGNOSIS — Z98.890 OTHER SPECIFIED POSTPROCEDURAL STATES: Chronic | ICD-10-CM

## 2019-07-05 DIAGNOSIS — G47.33 OBSTRUCTIVE SLEEP APNEA (ADULT) (PEDIATRIC): ICD-10-CM

## 2019-07-05 DIAGNOSIS — J45.909 UNSPECIFIED ASTHMA, UNCOMPLICATED: ICD-10-CM

## 2019-07-05 LAB
ANION GAP SERPL CALC-SCNC: 13 MMOL/L — SIGNIFICANT CHANGE UP (ref 5–17)
BLD GP AB SCN SERPL QL: NEGATIVE — SIGNIFICANT CHANGE UP
BUN SERPL-MCNC: 10 MG/DL — SIGNIFICANT CHANGE UP (ref 7–23)
CALCIUM SERPL-MCNC: 9.2 MG/DL — SIGNIFICANT CHANGE UP (ref 8.4–10.5)
CHLORIDE SERPL-SCNC: 100 MMOL/L — SIGNIFICANT CHANGE UP (ref 96–108)
CO2 SERPL-SCNC: 25 MMOL/L — SIGNIFICANT CHANGE UP (ref 22–31)
CREAT SERPL-MCNC: 0.68 MG/DL — SIGNIFICANT CHANGE UP (ref 0.5–1.3)
GLUCOSE SERPL-MCNC: 80 MG/DL — SIGNIFICANT CHANGE UP (ref 70–99)
HCT VFR BLD CALC: 29.6 % — LOW (ref 34.5–45)
HGB BLD-MCNC: 8.7 G/DL — LOW (ref 11.5–15.5)
MCHC RBC-ENTMCNC: 24 PG — LOW (ref 27–34)
MCHC RBC-ENTMCNC: 29.4 GM/DL — LOW (ref 32–36)
MCV RBC AUTO: 81.8 FL — SIGNIFICANT CHANGE UP (ref 80–100)
PLATELET # BLD AUTO: 543 K/UL — HIGH (ref 150–400)
POTASSIUM SERPL-MCNC: 3.9 MMOL/L — SIGNIFICANT CHANGE UP (ref 3.5–5.3)
POTASSIUM SERPL-SCNC: 3.9 MMOL/L — SIGNIFICANT CHANGE UP (ref 3.5–5.3)
RBC # BLD: 3.62 M/UL — LOW (ref 3.8–5.2)
RBC # FLD: 15.5 % — HIGH (ref 10.3–14.5)
RH IG SCN BLD-IMP: POSITIVE — SIGNIFICANT CHANGE UP
SODIUM SERPL-SCNC: 138 MMOL/L — SIGNIFICANT CHANGE UP (ref 135–145)
WBC # BLD: 7.04 K/UL — SIGNIFICANT CHANGE UP (ref 3.8–10.5)
WBC # FLD AUTO: 7.04 K/UL — SIGNIFICANT CHANGE UP (ref 3.8–10.5)

## 2019-07-05 PROCEDURE — 80048 BASIC METABOLIC PNL TOTAL CA: CPT

## 2019-07-05 PROCEDURE — 86901 BLOOD TYPING SEROLOGIC RH(D): CPT

## 2019-07-05 PROCEDURE — 85027 COMPLETE CBC AUTOMATED: CPT

## 2019-07-05 PROCEDURE — G0463: CPT

## 2019-07-05 PROCEDURE — 86850 RBC ANTIBODY SCREEN: CPT

## 2019-07-05 PROCEDURE — 86900 BLOOD TYPING SEROLOGIC ABO: CPT

## 2019-07-05 RX ORDER — ESOMEPRAZOLE MAGNESIUM 40 MG/1
1 CAPSULE, DELAYED RELEASE ORAL
Qty: 0 | Refills: 0 | DISCHARGE

## 2019-07-05 RX ORDER — CETIRIZINE HYDROCHLORIDE 10 MG/1
1 TABLET ORAL
Qty: 0 | Refills: 0 | DISCHARGE

## 2019-07-05 NOTE — H&P PST ADULT - RS GEN PE MLT RESP DETAILS PC
clear to auscultation bilaterally/breath sounds equal/respirations non-labored/good air movement/airway patent

## 2019-07-05 NOTE — H&P PST ADULT - HISTORY OF PRESENT ILLNESS
42 y/o F PMH chronic, severe asthma, completed a course of steroids within the past week for asthma exacerbation with URI, and is feeling better, has had 3 courses of steroids since November, TEODORA on CPAP,  0, para 0, endometriosis, uterine fibroids, heavy menstruation with iron deficiency anemia, S/P Mirena IUD placement.  Presents today for D+C, operative hysteroscopy. 42 y/o F PMH chronic, severe asthma, TEDOORA on CPAP, endometriosis, uterine fibroids, heavy menstruation with iron deficiency anemia s/p D&C, operative hysteroscopy 4/2019 planned for UFE on 7/10/19.

## 2019-07-05 NOTE — H&P PST ADULT - NSICDXPASTMEDICALHX_GEN_ALL_CORE_FT
PAST MEDICAL HISTORY:  Asthma chronic severe, denies any recent exacerbation or intubation hx    H/O eosinophilia     H/O gastroesophageal reflux (GERD)     Hypothyroidism resolved as per pt    Menorrhagia     Obesity     TEODORA on CPAP     Uterine fibroid PAST MEDICAL HISTORY:  Asthma chronic severe, followed by Dr. Elizabeth, 3-4 episodes of asthma bronchitis over the winter treated with po steroids, denies any exacerbation since 3/2019 or intubation hx    H/O eosinophilia     H/O gastroesophageal reflux (GERD)     Hypothyroidism resolved as per pt    Menorrhagia     Obesity     TEODORA on CPAP     Uterine fibroid

## 2019-07-05 NOTE — H&P PST ADULT - NSICDXPROBLEM_GEN_ALL_CORE_FT
PROBLEM DIAGNOSES  Problem: Uterine fibroid  Assessment and Plan: planned for UFE on 7/10/19.   PST labs send  preprocedure instructions discussed  UCG the day of procedure     Problem: Asthma  Assessment and Plan: continue Symbicort am of procedure  last pulm note 4/2019 on file PROBLEM DIAGNOSES  Problem: Uterine fibroid  Assessment and Plan: planned for UFE on 7/10/19.   PST labs send  preprocedure instructions discussed  UCG the day of procedure     Problem: Asthma  Assessment and Plan: continue Symbicort am of procedure  last pulm note 4/2019 on file     Problem: TEODORA on CPAP  Assessment and Plan: IR booking notified

## 2019-07-05 NOTE — H&P PST ADULT - NSICDXPASTSURGICALHX_GEN_ALL_CORE_FT
PAST SURGICAL HISTORY:  S/P LASIK surgery     S/P ovarian cystectomy left 2011    Status post D&C hysteroscopy

## 2019-07-10 ENCOUNTER — INPATIENT (INPATIENT)
Facility: HOSPITAL | Age: 44
LOS: 0 days | Discharge: HOME | DRG: 750 | End: 2019-07-11
Attending: HOSPITALIST | Admitting: RADIOLOGY
Payer: COMMERCIAL

## 2019-07-10 VITALS
HEART RATE: 83 BPM | RESPIRATION RATE: 15 BRPM | SYSTOLIC BLOOD PRESSURE: 155 MMHG | DIASTOLIC BLOOD PRESSURE: 94 MMHG | TEMPERATURE: 97 F | OXYGEN SATURATION: 100 %

## 2019-07-10 DIAGNOSIS — Z98.890 OTHER SPECIFIED POSTPROCEDURAL STATES: Chronic | ICD-10-CM

## 2019-07-10 DIAGNOSIS — Z29.9 ENCOUNTER FOR PROPHYLACTIC MEASURES, UNSPECIFIED: ICD-10-CM

## 2019-07-10 DIAGNOSIS — N92.0 EXCESSIVE AND FREQUENT MENSTRUATION WITH REGULAR CYCLE: ICD-10-CM

## 2019-07-10 DIAGNOSIS — D25.9 LEIOMYOMA OF UTERUS, UNSPECIFIED: ICD-10-CM

## 2019-07-10 DIAGNOSIS — E03.9 HYPOTHYROIDISM, UNSPECIFIED: ICD-10-CM

## 2019-07-10 LAB
HCT VFR BLD CALC: 28.8 % — LOW (ref 34.5–45)
HGB BLD-MCNC: 9.7 G/DL — LOW (ref 11.5–15.5)
MCHC RBC-ENTMCNC: 26.2 PG — LOW (ref 27–34)
MCHC RBC-ENTMCNC: 33.7 GM/DL — SIGNIFICANT CHANGE UP (ref 32–36)
MCV RBC AUTO: 78 FL — LOW (ref 80–100)
PLATELET # BLD AUTO: 454 K/UL — HIGH (ref 150–400)
RBC # BLD: 3.69 M/UL — LOW (ref 3.8–5.2)
RBC # FLD: 14.4 % — SIGNIFICANT CHANGE UP (ref 10.3–14.5)
RH IG SCN BLD-IMP: POSITIVE — SIGNIFICANT CHANGE UP
WBC # BLD: 10.6 K/UL — HIGH (ref 3.8–10.5)
WBC # FLD AUTO: 10.6 K/UL — HIGH (ref 3.8–10.5)

## 2019-07-10 PROCEDURE — 76937 US GUIDE VASCULAR ACCESS: CPT | Mod: 26

## 2019-07-10 PROCEDURE — 36247 INS CATH ABD/L-EXT ART 3RD: CPT | Mod: 59

## 2019-07-10 PROCEDURE — 37243 VASC EMBOLIZE/OCCLUDE ORGAN: CPT

## 2019-07-10 PROCEDURE — 99222 1ST HOSP IP/OBS MODERATE 55: CPT | Mod: AI

## 2019-07-10 RX ORDER — CIPROFLOXACIN LACTATE 400MG/40ML
400 VIAL (ML) INTRAVENOUS ONCE
Refills: 0 | Status: DISCONTINUED | OUTPATIENT
Start: 2019-07-10 | End: 2019-07-11

## 2019-07-10 RX ORDER — PANTOPRAZOLE SODIUM 20 MG/1
40 TABLET, DELAYED RELEASE ORAL
Refills: 0 | Status: DISCONTINUED | OUTPATIENT
Start: 2019-07-10 | End: 2019-07-11

## 2019-07-10 RX ORDER — DOCUSATE SODIUM 100 MG
100 CAPSULE ORAL
Refills: 0 | Status: DISCONTINUED | OUTPATIENT
Start: 2019-07-10 | End: 2019-07-11

## 2019-07-10 RX ORDER — ALBUTEROL 90 UG/1
2 AEROSOL, METERED ORAL EVERY 6 HOURS
Refills: 0 | Status: DISCONTINUED | OUTPATIENT
Start: 2019-07-10 | End: 2019-07-11

## 2019-07-10 RX ORDER — BUDESONIDE AND FORMOTEROL FUMARATE DIHYDRATE 160; 4.5 UG/1; UG/1
2 AEROSOL RESPIRATORY (INHALATION)
Refills: 0 | Status: DISCONTINUED | OUTPATIENT
Start: 2019-07-10 | End: 2019-07-11

## 2019-07-10 RX ORDER — CIPROFLOXACIN LACTATE 400MG/40ML
400 VIAL (ML) INTRAVENOUS EVERY 12 HOURS
Refills: 0 | Status: DISCONTINUED | OUTPATIENT
Start: 2019-07-10 | End: 2019-07-11

## 2019-07-10 RX ORDER — ACETAMINOPHEN 500 MG
1000 TABLET ORAL EVERY 8 HOURS
Refills: 0 | Status: COMPLETED | OUTPATIENT
Start: 2019-07-10 | End: 2019-07-10

## 2019-07-10 RX ORDER — HYDROMORPHONE HYDROCHLORIDE 2 MG/ML
0.5 INJECTION INTRAMUSCULAR; INTRAVENOUS; SUBCUTANEOUS
Refills: 0 | Status: DISCONTINUED | OUTPATIENT
Start: 2019-07-10 | End: 2019-07-11

## 2019-07-10 RX ORDER — KETOROLAC TROMETHAMINE 30 MG/ML
30 SYRINGE (ML) INJECTION EVERY 6 HOURS
Refills: 0 | Status: DISCONTINUED | OUTPATIENT
Start: 2019-07-10 | End: 2019-07-11

## 2019-07-10 RX ORDER — CIPROFLOXACIN LACTATE 400MG/40ML
VIAL (ML) INTRAVENOUS
Refills: 0 | Status: DISCONTINUED | OUTPATIENT
Start: 2019-07-10 | End: 2019-07-11

## 2019-07-10 RX ORDER — SODIUM CHLORIDE 9 MG/ML
1000 INJECTION INTRAMUSCULAR; INTRAVENOUS; SUBCUTANEOUS
Refills: 0 | Status: DISCONTINUED | OUTPATIENT
Start: 2019-07-10 | End: 2019-07-11

## 2019-07-10 RX ORDER — ONDANSETRON 8 MG/1
4 TABLET, FILM COATED ORAL EVERY 6 HOURS
Refills: 0 | Status: DISCONTINUED | OUTPATIENT
Start: 2019-07-10 | End: 2019-07-11

## 2019-07-10 RX ORDER — CHOLECALCIFEROL (VITAMIN D3) 125 MCG
1000 CAPSULE ORAL DAILY
Refills: 0 | Status: DISCONTINUED | OUTPATIENT
Start: 2019-07-10 | End: 2019-07-11

## 2019-07-10 RX ORDER — NALOXONE HYDROCHLORIDE 4 MG/.1ML
0.1 SPRAY NASAL
Refills: 0 | Status: DISCONTINUED | OUTPATIENT
Start: 2019-07-10 | End: 2019-07-11

## 2019-07-10 RX ORDER — FERROUS SULFATE 325(65) MG
325 TABLET ORAL DAILY
Refills: 0 | Status: DISCONTINUED | OUTPATIENT
Start: 2019-07-10 | End: 2019-07-11

## 2019-07-10 RX ORDER — HYDROMORPHONE HYDROCHLORIDE 2 MG/ML
30 INJECTION INTRAMUSCULAR; INTRAVENOUS; SUBCUTANEOUS
Refills: 0 | Status: DISCONTINUED | OUTPATIENT
Start: 2019-07-10 | End: 2019-07-11

## 2019-07-10 RX ORDER — DIPHENHYDRAMINE HCL 50 MG
50 CAPSULE ORAL AT BEDTIME
Refills: 0 | Status: DISCONTINUED | OUTPATIENT
Start: 2019-07-10 | End: 2019-07-11

## 2019-07-10 RX ADMIN — HYDROMORPHONE HYDROCHLORIDE 30 MILLILITER(S): 2 INJECTION INTRAMUSCULAR; INTRAVENOUS; SUBCUTANEOUS at 12:00

## 2019-07-10 RX ADMIN — BUDESONIDE AND FORMOTEROL FUMARATE DIHYDRATE 2 PUFF(S): 160; 4.5 AEROSOL RESPIRATORY (INHALATION) at 20:00

## 2019-07-10 RX ADMIN — HYDROMORPHONE HYDROCHLORIDE 30 MILLILITER(S): 2 INJECTION INTRAMUSCULAR; INTRAVENOUS; SUBCUTANEOUS at 20:12

## 2019-07-10 RX ADMIN — Medication 30 MILLIGRAM(S): at 17:45

## 2019-07-10 RX ADMIN — Medication 400 MILLIGRAM(S): at 20:30

## 2019-07-10 RX ADMIN — Medication 200 MILLIGRAM(S): at 21:39

## 2019-07-10 RX ADMIN — Medication 1000 MILLIGRAM(S): at 21:00

## 2019-07-10 RX ADMIN — HYDROMORPHONE HYDROCHLORIDE 30 MILLILITER(S): 2 INJECTION INTRAMUSCULAR; INTRAVENOUS; SUBCUTANEOUS at 21:11

## 2019-07-10 RX ADMIN — Medication 30 MILLIGRAM(S): at 17:12

## 2019-07-10 RX ADMIN — HYDROMORPHONE HYDROCHLORIDE 0.5 MILLIGRAM(S): 2 INJECTION INTRAMUSCULAR; INTRAVENOUS; SUBCUTANEOUS at 16:54

## 2019-07-10 RX ADMIN — Medication 30 MILLIGRAM(S): at 23:26

## 2019-07-10 NOTE — H&P ADULT - NSICDXPASTMEDICALHX_GEN_ALL_CORE_FT
PAST MEDICAL HISTORY:  Asthma chronic severe, followed by Dr. Elizabeth, 3-4 episodes of asthma bronchitis over the winter treated with po steroids, denies any exacerbation since 3/2019 or intubation hx    H/O eosinophilia     H/O gastroesophageal reflux (GERD)     Hypothyroidism resolved as per pt    Menorrhagia     Obesity     TEODORA on CPAP     Uterine fibroid

## 2019-07-10 NOTE — H&P ADULT - PROBLEM SELECTOR PLAN 1
Pt was scheduled for IR embolization of uterine fibroid today; now s/p procedure  - no complications, pt hds, and doing well  - as per IR - cont PCA dilaudid pump and IV toradol, tylenol for pain control  - remove marin catheter later today, TOV  - zofran prn nausea

## 2019-07-10 NOTE — PROGRESS NOTE ADULT - SUBJECTIVE AND OBJECTIVE BOX
Vascular & Interventional Radiology Post-Procedure Note    Pre-Procedure Diagnosis: Uterine Fibroids  Post-Procedure Diagnosis: Same as pre.  Indications for Procedure: Symptomatic uterine Fibroids    : Madison CUEVAS  Assistant(s): Radha CUEVAS    Procedure Details/Findings: Successful uterine fibroid embolization using particles-embospheres. Access via R CFA. Closure using mynx device.     Complications: None  Estimated Blood Loss: Minimal  Specimen: N/A  Contrast: See Report  Sedation: MAC  Patient Condition/Disposition: Stable. PACU then floor    Plan:     Admit under medicine service  Pain control with PCA, IV Tordal and/or IV tylenol and antiemetics.   Please keep R Leg straight for 4 hours.

## 2019-07-10 NOTE — H&P ADULT - HISTORY OF PRESENT ILLNESS
44 y/o f w/ PMH of chronic, severe asthma, TEODORA on CPAP, endometriosis, uterine fibroids, heavy menstruation with iron deficiency anemia s/p D&C, operative hysteroscopy 4/2019, had uterine fibroid embolization scheduled for today and is now doing well post-procedurally.  Pt had been experiencing menorrhagia for some time, and has been on iron supplements.  With her history of fibroids, she was scheduled for IR embolization in hopes of relief of symptoms.  She is experiencing some abdominal pain at this time after the procedure which was performed without complications.  She is on a dilaudid pump and toradol for pain relief.  She is planned ot have her oley taken out later today, and be monitored over night for bleeding, and for pain control.

## 2019-07-10 NOTE — H&P ADULT - ASSESSMENT
44 y/o f w/ PMH of chronic, severe asthma, TEODORA on CPAP, endometriosis, uterine fibroids, heavy menstruation with iron deficiency anemia s/p D&C, operative hysteroscopy 4/2019 s/p uterine fibroid embolization today

## 2019-07-10 NOTE — H&P ADULT - PROBLEM SELECTOR PLAN 5
SCD pt has h/o hypothyroidism in the past, but has been off meds for a year, because eshe was found ot have normal levels  - will check TSH, and free T4 tonight anf f/u results  - pt does not have good PMD set up; upon discharge would set up appointment at [address] [place] [address] Clinic for pt

## 2019-07-11 ENCOUNTER — TRANSCRIPTION ENCOUNTER (OUTPATIENT)
Age: 44
End: 2019-07-11

## 2019-07-11 VITALS
HEART RATE: 78 BPM | OXYGEN SATURATION: 98 % | DIASTOLIC BLOOD PRESSURE: 75 MMHG | RESPIRATION RATE: 18 BRPM | SYSTOLIC BLOOD PRESSURE: 123 MMHG | TEMPERATURE: 97 F

## 2019-07-11 LAB
ANION GAP SERPL CALC-SCNC: 13 MMOL/L — SIGNIFICANT CHANGE UP (ref 5–17)
BUN SERPL-MCNC: 8 MG/DL — SIGNIFICANT CHANGE UP (ref 7–23)
CALCIUM SERPL-MCNC: 8.5 MG/DL — SIGNIFICANT CHANGE UP (ref 8.4–10.5)
CHLORIDE SERPL-SCNC: 96 MMOL/L — SIGNIFICANT CHANGE UP (ref 96–108)
CO2 SERPL-SCNC: 25 MMOL/L — SIGNIFICANT CHANGE UP (ref 22–31)
CREAT SERPL-MCNC: 0.64 MG/DL — SIGNIFICANT CHANGE UP (ref 0.5–1.3)
GLUCOSE SERPL-MCNC: 100 MG/DL — HIGH (ref 70–99)
HCT VFR BLD CALC: 28.8 % — LOW (ref 34.5–45)
HGB BLD-MCNC: 9.5 G/DL — LOW (ref 11.5–15.5)
MCHC RBC-ENTMCNC: 26.1 PG — LOW (ref 27–34)
MCHC RBC-ENTMCNC: 33.1 GM/DL — SIGNIFICANT CHANGE UP (ref 32–36)
MCV RBC AUTO: 78.6 FL — LOW (ref 80–100)
PLATELET # BLD AUTO: 452 K/UL — HIGH (ref 150–400)
POTASSIUM SERPL-MCNC: 3.9 MMOL/L — SIGNIFICANT CHANGE UP (ref 3.5–5.3)
POTASSIUM SERPL-SCNC: 3.9 MMOL/L — SIGNIFICANT CHANGE UP (ref 3.5–5.3)
RBC # BLD: 3.66 M/UL — LOW (ref 3.8–5.2)
RBC # FLD: 14.6 % — HIGH (ref 10.3–14.5)
SODIUM SERPL-SCNC: 134 MMOL/L — LOW (ref 135–145)
T4 FREE SERPL-MCNC: 0.9 NG/DL — SIGNIFICANT CHANGE UP (ref 0.9–1.8)
TSH SERPL-MCNC: 1.24 UIU/ML — SIGNIFICANT CHANGE UP (ref 0.27–4.2)
WBC # BLD: 10.9 K/UL — HIGH (ref 3.8–10.5)
WBC # FLD AUTO: 10.9 K/UL — HIGH (ref 3.8–10.5)

## 2019-07-11 PROCEDURE — C1887: CPT

## 2019-07-11 PROCEDURE — 86901 BLOOD TYPING SEROLOGIC RH(D): CPT

## 2019-07-11 PROCEDURE — C1889: CPT

## 2019-07-11 PROCEDURE — 76937 US GUIDE VASCULAR ACCESS: CPT

## 2019-07-11 PROCEDURE — 37243 VASC EMBOLIZE/OCCLUDE ORGAN: CPT

## 2019-07-11 PROCEDURE — 84443 ASSAY THYROID STIM HORMONE: CPT

## 2019-07-11 PROCEDURE — 86900 BLOOD TYPING SEROLOGIC ABO: CPT

## 2019-07-11 PROCEDURE — 84439 ASSAY OF FREE THYROXINE: CPT

## 2019-07-11 PROCEDURE — C1894: CPT

## 2019-07-11 PROCEDURE — 85027 COMPLETE CBC AUTOMATED: CPT

## 2019-07-11 PROCEDURE — 94640 AIRWAY INHALATION TREATMENT: CPT

## 2019-07-11 PROCEDURE — 36247 INS CATH ABD/L-EXT ART 3RD: CPT | Mod: 59

## 2019-07-11 PROCEDURE — C1760: CPT

## 2019-07-11 PROCEDURE — 80048 BASIC METABOLIC PNL TOTAL CA: CPT

## 2019-07-11 PROCEDURE — 99239 HOSP IP/OBS DSCHRG MGMT >30: CPT | Mod: GC

## 2019-07-11 PROCEDURE — C1769: CPT

## 2019-07-11 RX ORDER — ESOMEPRAZOLE MAGNESIUM 40 MG/1
1 CAPSULE, DELAYED RELEASE ORAL
Qty: 0 | Refills: 0 | DISCHARGE

## 2019-07-11 RX ORDER — PANTOPRAZOLE SODIUM 20 MG/1
1 TABLET, DELAYED RELEASE ORAL
Qty: 0 | Refills: 0 | DISCHARGE
Start: 2019-07-11

## 2019-07-11 RX ORDER — ONDANSETRON 8 MG/1
1 TABLET, FILM COATED ORAL
Qty: 21 | Refills: 0
Start: 2019-07-11 | End: 2019-07-17

## 2019-07-11 RX ORDER — OXYCODONE AND ACETAMINOPHEN 5; 325 MG/1; MG/1
1 TABLET ORAL EVERY 4 HOURS
Refills: 0 | Status: DISCONTINUED | OUTPATIENT
Start: 2019-07-11 | End: 2019-07-11

## 2019-07-11 RX ORDER — IBUPROFEN 200 MG
1 TABLET ORAL
Qty: 0 | Refills: 0 | DISCHARGE
Start: 2019-07-11 | End: 2019-07-17

## 2019-07-11 RX ORDER — DIPHENHYDRAMINE HCL 50 MG
25 CAPSULE ORAL DAILY
Refills: 0 | Status: DISCONTINUED | OUTPATIENT
Start: 2019-07-11 | End: 2019-07-11

## 2019-07-11 RX ORDER — DOCUSATE SODIUM 100 MG
1 CAPSULE ORAL
Qty: 20 | Refills: 0
Start: 2019-07-11 | End: 2019-07-20

## 2019-07-11 RX ORDER — ALBUTEROL 90 UG/1
2 AEROSOL, METERED ORAL
Qty: 0 | Refills: 0 | DISCHARGE
Start: 2019-07-11

## 2019-07-11 RX ORDER — IBUPROFEN 200 MG
800 TABLET ORAL ONCE
Refills: 0 | Status: COMPLETED | OUTPATIENT
Start: 2019-07-11 | End: 2019-07-11

## 2019-07-11 RX ORDER — IBUPROFEN 200 MG
1 TABLET ORAL
Qty: 20 | Refills: 0
Start: 2019-07-11 | End: 2019-07-15

## 2019-07-11 RX ORDER — PANTOPRAZOLE SODIUM 20 MG/1
1 TABLET, DELAYED RELEASE ORAL
Qty: 10 | Refills: 0
Start: 2019-07-11 | End: 2019-07-20

## 2019-07-11 RX ADMIN — BUDESONIDE AND FORMOTEROL FUMARATE DIHYDRATE 2 PUFF(S): 160; 4.5 AEROSOL RESPIRATORY (INHALATION) at 05:19

## 2019-07-11 RX ADMIN — Medication 800 MILLIGRAM(S): at 15:28

## 2019-07-11 RX ADMIN — OXYCODONE AND ACETAMINOPHEN 1 TABLET(S): 5; 325 TABLET ORAL at 12:52

## 2019-07-11 RX ADMIN — Medication 25 MILLIGRAM(S): at 01:00

## 2019-07-11 RX ADMIN — Medication 200 MILLIGRAM(S): at 09:06

## 2019-07-11 RX ADMIN — OXYCODONE AND ACETAMINOPHEN 1 TABLET(S): 5; 325 TABLET ORAL at 12:22

## 2019-07-11 RX ADMIN — PANTOPRAZOLE SODIUM 40 MILLIGRAM(S): 20 TABLET, DELAYED RELEASE ORAL at 09:06

## 2019-07-11 RX ADMIN — Medication 1000 UNIT(S): at 12:22

## 2019-07-11 RX ADMIN — Medication 325 MILLIGRAM(S): at 12:22

## 2019-07-11 NOTE — DISCHARGE NOTE PROVIDER - NSDCFUADDAPPT_GEN_ALL_CORE_FT
Call [phone] to schedule appointment with Dr. Wallace in 4 weeks.  Will get MRI referral when follow up with Dr. Wallace.     Establish care with general internal medicine clinic for primary care.

## 2019-07-11 NOTE — CHART NOTE - NSCHARTNOTEFT_GEN_A_CORE
Was called to bedside for reports of an allergic reaction in Mrs. Edwards. She reports that she normally gets a reaction to Mobic (meloxicam), an NSAID, and that she had received torodal (ketorolac) also an NSAID in the ED a couple of hours ago. I examined the bedside for potential allergic reaction. Patient described being anxious, but no difficulty breathing, she claimed to have swelling in her neck as well. On exam her lungs were clear bilaterally. No wheezes noted. Her oral exam revealed no swelling in the mouth. No compromise of airways. She did note feeling a little itchy around the neck. I did give her 25 mg of benadryl for potential minor allergic reaction given that meloxicam and ketorolac may have some small level of cross reactivity. I am not concerned for severe allergic reaction given good and stable vitals, good aeration on exam and no signs of anaphylaxis.

## 2019-07-11 NOTE — PROGRESS NOTE ADULT - SUBJECTIVE AND OBJECTIVE BOX
Interventional Radiology Follow- Up Note      43y Female s/p uterine artery embolization in Interventional Radiology. Events last night noted. Pt reports itching has resolved. Denies shortness of breath.  Pt reports less pain than yesterday. Appetite is improved. Pt has ambulated.     Vitals: T(F): 98.3 (07-11-19 @ 04:49), Max: 98.3 (07-11-19 @ 04:49)  HR: 66 (07-11-19 @ 05:22) (61 - 96)  BP: 122/78 (07-11-19 @ 04:49) (118/87 - 157/86)  RR: 18 (07-11-19 @ 04:49) (14 - 18)  SpO2: 99% (07-11-19 @ 05:22) (92% - 100%)  Wt(kg): --    LABS:                        9.7    10.6  )-----------( 454      ( 10 Jul 2019 21:17 )             28.8             I&O's Detail    10 Jul 2019 07:01  -  11 Jul 2019 07:00  --------------------------------------------------------  IN:    sodium chloride 0.9%.: 900 mL  Total IN: 900 mL    OUT:    Indwelling Catheter - Urethral: 975 mL  Total OUT: 975 mL    Total NET: -75 mL            PHYSICAL EXAM:  General: Nontoxic, in NAD  Neuro:  Alert & oriented x 3  Extremities: Right groin soft. No swelling.  No pedal edema or calf tenderness noted     Impression: 43y Female admitted with Uterine leiomyoma s/p UAE    PMH Menorrhagia  Uterine fibroid  Hypothyroidism  H/O eosinophilia  H/O gastroesophageal reflux (GERD)  Obesity  TEODORA on CPAP  Asthma  Thyroid activity decreased      Plan:  - D/C PCA   - Encourage PO intake and pain medication  - Consider d/c home today

## 2019-07-11 NOTE — PROGRESS NOTE ADULT - SUBJECTIVE AND OBJECTIVE BOX
Day 1 of Anesthesia Pain Management Service    SUBJECTIVE: Patient is doing well with IV PCA    Pain Scale Score:	[X] Refer to charted pain scores    THERAPY:    [ ] IV PCA Morphine		[ ] 5 mg/mL	[ ] 1 mg/mL  [X] IV PCA Hydromorphone	[ ] 5 mg/mL	[X] 1 mg/mL  [ ] IV PCA Fentanyl		[ ] 50 micrograms/mL    Demand dose: 0.2 mg     Lockout: 6 minutes   Continuous Rate: 0 mg/hr  4 Hour Limit: 4 mg    MEDICATIONS  (STANDING):  buDESOnide 160 MICROgram(s)/formoterol 4.5 MICROgram(s) Inhaler 2 Puff(s) Inhalation two times a day  cholecalciferol 1000 Unit(s) Oral daily  ciprofloxacin   IVPB      ciprofloxacin   IVPB 400 milliGRAM(s) IV Intermittent once  ciprofloxacin   IVPB 400 milliGRAM(s) IV Intermittent every 12 hours  docusate sodium Liquid 100 milliGRAM(s) Oral two times a day  ferrous    sulfate 325 milliGRAM(s) Oral daily  HYDROmorphone PCA (1 mG/mL) 30 milliLiter(s) PCA Continuous PCA Continuous  pantoprazole    Tablet 40 milliGRAM(s) Oral before breakfast  sodium chloride 0.9%. 1000 milliLiter(s) (100 mL/Hr) IV Continuous <Continuous>    MEDICATIONS  (PRN):  ALBUTerol    90 MICROgram(s) HFA Inhaler 2 Puff(s) Inhalation every 6 hours PRN Shortness of Breath and/or Wheezing  diphenhydrAMINE 50 milliGRAM(s) Oral at bedtime PRN Sleep  diphenhydrAMINE 25 milliGRAM(s) Oral daily PRN Rash and/or Itching  HYDROmorphone PCA (1 mG/mL) Rescue Clinician Bolus 0.5 milliGRAM(s) IV Push every 15 minutes PRN for Pain Scale GREATER THAN 6  naloxone Injectable 0.1 milliGRAM(s) IV Push every 3 minutes PRN For ANY of the following changes in patient status:  A. RR LESS THAN 10 breaths per minute, B. Oxygen saturation LESS THAN 90%, C. Sedation score of 6  ondansetron Injectable 4 milliGRAM(s) IV Push every 6 hours PRN Nausea      OBJECTIVE:    Sedation Score:	[ X] Alert	[ ] Drowsy 	[ ] Arousable	[ ] Asleep	[ ] Unresponsive    Side Effects:	[X ] None	[ ] Nausea	[ ] Vomiting	[ ] Pruritus  		[ ] Other:    Vital Signs Last 24 Hrs  T(C): 36.8 (11 Jul 2019 04:49), Max: 36.8 (11 Jul 2019 04:49)  T(F): 98.3 (11 Jul 2019 04:49), Max: 98.3 (11 Jul 2019 04:49)  HR: 66 (11 Jul 2019 05:22) (61 - 96)  BP: 122/78 (11 Jul 2019 04:49) (118/87 - 157/86)  BP(mean): 89 (10 Jul 2019 20:00) (89 - 120)  RR: 18 (11 Jul 2019 04:49) (14 - 18)  SpO2: 99% (11 Jul 2019 05:22) (92% - 100%)    ASSESSMENT/ PLAN    Therapy to  be:               [ ] Continued   [X] Discontinued   [ ] Changed to PRN Analgesics    Documentation and Verification of current medications:   [X] Done	[ ] Not done, not eligible    Comments:  Change to PRN Analgesics  For possible discharge today (as per the patient)

## 2019-07-11 NOTE — DISCHARGE NOTE PROVIDER - CARE PROVIDERS DIRECT ADDRESSES
,DirectAddress_Unknown,[email or web address],DirectAddress_Unknown,DirectAddress_Unknown

## 2019-07-11 NOTE — DISCHARGE NOTE PROVIDER - NSDCCPCAREPLAN_GEN_ALL_CORE_FT
PRINCIPAL DISCHARGE DIAGNOSIS  Diagnosis: Uterine fibroid  Assessment and Plan of Treatment: You have uterine fibroid, and underwent IR procedure to cut down the blood flow to the fibroid. There were no acute complications. Abdominal pain subsided with pain medications, and you were able to eat without nausea or vomiting. Follow up with Dr. Wallace in 4 weeks, Dr. Ramsey in 2 weeks, and establish care for primary care at [facility] internal medicine clinic.      SECONDARY DISCHARGE DIAGNOSES  Diagnosis: Hypothyroidism  Assessment and Plan of Treatment: You have hypothyroidism in the past. The hormone level was normal when tested in the hospital.    Diagnosis: TEDOORA on CPAP  Assessment and Plan of Treatment: You have sleep apnea, and used your own CPAP machine while in the hospital    Diagnosis: Uterine fibroid  Assessment and Plan of Treatment: You have uterine fibroid, and underwent IR procedure to cut down the blood flow to the fibroid. There were no acute complications. Follow up with your GYN in 2 weeks.

## 2019-07-11 NOTE — CHART NOTE - NSCHARTNOTEFT_GEN_A_CORE
Please prescribe these medications upon discharge.     Motrin 800mg q6hrs X 5days ( pt takes this at home without any reaction)  Percocet 5/325mg PRN q6hrs X 5days  Colace 100mg BID X 10days   Zofran 8mg odt q8hs PRN X 7days   Protonix 40mg qd X 10days    Follow up with Gyn in 2 weeks and With Dr. Wallace (IR) in 4 weeks.   Call to make appt with IR at [phone].  Will give prescription for MRI when pt follows up.  Please order it to vivo.

## 2019-07-11 NOTE — PROGRESS NOTE ADULT - ATTENDING COMMENTS
-Patient seen/examined on 7/11/19. Case/plan discussed with the intern and resident as reviewed/edited by me above and in any comments below.  -Discussed with Dr. Wallace.   -Patient stable for DC to home today with outpatient IR and gyn and PMD follow up.  -33 minutes spent on the discharge process.

## 2019-07-11 NOTE — PROVIDER CONTACT NOTE (OTHER) - ACTION/TREATMENT ORDERED:
benadryl 25 mg p.o. PT. REASSURED BY DR. TRUONG SHE WAS FINE, ON ASSESSMENT LUNGS CLEAR, NO SWELLING NOTED, AND NO RASH.

## 2019-07-11 NOTE — PROGRESS NOTE ADULT - PROBLEM SELECTOR PLAN 5
pt has h/o hypothyroidism in the past, but has been off meds for a year, because eshe was found ot have normal levels  - TSH normal.  - Pt does not have good PMD set up; upon discharge would set up appointment at [address] [place] [address] Clinic for pt pt has h/o hypothyroidism in the past, but has been off meds for a year, because eshe was found to have normal levels  - TSH normal.  - Pt does not have good PMD set up; upon discharge would set up appointment at [address] [place] [address] Clinic for pt

## 2019-07-11 NOTE — DISCHARGE NOTE NURSING/CASE MANAGEMENT/SOCIAL WORK - NSDCDPATPORTLINK_GEN_ALL_CORE
You can access the [facility] Patient Portal, offered by [facility], by registering with the following website: [email or web address]

## 2019-07-11 NOTE — PROVIDER CONTACT NOTE (OTHER) - ASSESSMENT
pt. awake, alert, crying, c/o ityching, and swelling of neck , thinks she is haVING AN ALLERGIC REACTION

## 2019-07-11 NOTE — DISCHARGE NOTE PROVIDER - NSDCCPTREATMENT_GEN_ALL_CORE_FT
PRINCIPAL PROCEDURE  Procedure: Embolization of uterine fibroid  Findings and Treatment: You had an IR procedure to cut down blood flow to the uterine fibroid. Please follow up with Dr. Wallace in 4 weeks.

## 2019-07-11 NOTE — DISCHARGE NOTE PROVIDER - HOSPITAL COURSE
42 y/o f w/ PMH of chronic, severe asthma, TEODORA on CPAP, endometriosis, uterine fibroids, heavy menstruation with iron deficiency anemia s/p D&C, operative hysteroscopy 4/2019, admitted overnight for abdominal pain and nausea s/p uterine fibroid embolization. She was on a Dilaudid pump and IV Toradol for pain relief. Patient experienced allergic reaction to IV Toradol with left facial/neck swelling and itchiness. Relieved with Benadryl. Diet was advanced, tolerated PO without nausea or vomiting. Abdominal pain subsided, and patient is hemodynamically stable and ready to be discharged as per IR. Outpatient meds include Motrin 800 mg (pt takes this at home without any reaction), Percocet as needed, Colace, Zofran and Protonix as per IR. Will follow up with Dr. Wallace (IR) in 4 weeks and Dr. Ramsey (GYN) in 2 weeks. 42 y/o f w/ PMH of chronic, severe asthma, TEODORA on CPAP, endometriosis, uterine fibroids, heavy menstruation with iron deficiency anemia s/p D&C, operative hysteroscopy 4/2019, admitted overnight for abdominal pain and nausea s/p uterine fibroid embolization. She was on a Dilaudid pump and IV Toradol for pain relief. Patient experienced allergic reaction to IV Toradol with left facial/neck swelling and itchiness. Relieved with Benadryl. Diet was advanced, tolerated PO without nausea or vomiting. Abdominal pain subsided, and patient is hemodynamically stable and ready to be discharged as per IR. Outpatient meds include Motrin 800 mg (pt takes this at home without any reaction), Percocet as needed, Colace, Zofran and Protonix as per IR. Will follow up with Dr. Wallace (IR) in 4 weeks and Dr. Ramsey (GYN) in 2 weeks. Establish care for primary care at [address] [place] [address].

## 2019-07-11 NOTE — PROGRESS NOTE ADULT - ASSESSMENT
42 y/o f w/ PMH of chronic, severe asthma, TEODORA on CPAP, endometriosis, uterine fibroids, heavy menstruation with iron deficiency anemia s/p D&C, operative hysteroscopy 4/2019, admitted for abdominal pain s/p uterine fibroid embolization. Abdominal pain subsided, tolerated PO, clinically improving.

## 2019-07-11 NOTE — PROGRESS NOTE ADULT - SUBJECTIVE AND OBJECTIVE BOX
Janki Mckinney, PGY-1  Pager: [phone]      Patient is a 43y old  Female who presents with a chief complaint of IR embolization of uterine fibroid (11 Jul 2019 09:32)      SUBJECTIVE / OVERNIGHT EVENTS:  No acute events overnight.    Patient seen and examined in AM. Reported    Patient denied fevers, CP, SOB, lower extremity pain.     MEDICATIONS  (STANDING):  buDESOnide 160 MICROgram(s)/formoterol 4.5 MICROgram(s) Inhaler 2 Puff(s) Inhalation two times a day  cholecalciferol 1000 Unit(s) Oral daily  ciprofloxacin   IVPB      ciprofloxacin   IVPB 400 milliGRAM(s) IV Intermittent once  ciprofloxacin   IVPB 400 milliGRAM(s) IV Intermittent every 12 hours  docusate sodium Liquid 100 milliGRAM(s) Oral two times a day  ferrous    sulfate 325 milliGRAM(s) Oral daily  pantoprazole    Tablet 40 milliGRAM(s) Oral before breakfast  sodium chloride 0.9%. 1000 milliLiter(s) (100 mL/Hr) IV Continuous <Continuous>    MEDICATIONS  (PRN):  ALBUTerol    90 MICROgram(s) HFA Inhaler 2 Puff(s) Inhalation every 6 hours PRN Shortness of Breath and/or Wheezing  diphenhydrAMINE 50 milliGRAM(s) Oral at bedtime PRN Sleep  diphenhydrAMINE 25 milliGRAM(s) Oral daily PRN Rash and/or Itching  oxyCODONE    5 mG/acetaminophen 325 mG 1 Tablet(s) Oral every 4 hours PRN Moderate Pain (4 - 6)      I&O's Summary    10 Jul 2019 07:01  -  11 Jul 2019 07:00  --------------------------------------------------------  IN: 900 mL / OUT: 975 mL / NET: -75 mL        Vital Signs Last 24 Hrs  T(C): 36.8 (11 Jul 2019 04:49), Max: 36.8 (11 Jul 2019 04:49)  T(F): 98.3 (11 Jul 2019 04:49), Max: 98.3 (11 Jul 2019 04:49)  HR: 66 (11 Jul 2019 05:22) (61 - 96)  BP: 122/78 (11 Jul 2019 04:49) (118/87 - 147/76)  BP(mean): 89 (10 Jul 2019 20:00) (89 - 108)  RR: 18 (11 Jul 2019 04:49) (14 - 18)  SpO2: 99% (11 Jul 2019 05:22) (95% - 100%)    PHYSICAL EXAM:  General: No acute distress, well-nourished  HEENT: Extraocular muscles intact, clear conjunctiva, pupils round and equal, reactive to light; normal oropharynx  Neck: Supple, no jugular vein distention  Lungs: Normal respiratory effort on room air; bilateral lung fields clear to auscultation; no wheezes or crackles.  Heart: Normal rate and regular rhythm; S1/S2 present; No murmurs, rubs or gallops appreciated; Capillary refill < 2 seconds  Abdomen: Soft, non-distended, non-tender, no masses; bowel sound present  Extremities: No lower extremity edema; no joint swelling; full range of motion  Neurology: Alert and oriented to person, time, place and situation. No focal deficits; strength and sensation grossly intact. Spontaneous movements of all 4 extremities.  Skin: Warm, dry and appropriate color for skin tone; no new rashes or lesions.       LABS:                        9.5    10.9  )-----------( [id number]      ( 11 Jul 2019 07:36 )             28.8       07-11    134<L>  |  96  |  8   ----------------------------<  100<H>  3.9   |  25  |  0.64    Ca    8.5      11 Jul 2019 07:36         RADIOLOGY & ADDITIONAL TESTS: Janki Mckinney, PGY-1  Pager: [phone]      Patient is a 43y old  Female who presents with a chief complaint of IR embolization of uterine fibroid (11 Jul 2019 09:32)      SUBJECTIVE / OVERNIGHT EVENTS:  No acute events overnight. Diet advanced, patient tolerated PO without nausea or vomiting. Abdominal pain felt like menstrual cramp, and subsided on pain regimen. Had left facial swelling and itchiness after taking IV Toradol, subsided with Benadryl.      Patient denied fevers, CP, SOB, or lower extremity pain/swelling.     MEDICATIONS  (STANDING):  buDESOnide 160 MICROgram(s)/formoterol 4.5 MICROgram(s) Inhaler 2 Puff(s) Inhalation two times a day  cholecalciferol 1000 Unit(s) Oral daily  ciprofloxacin   IVPB      ciprofloxacin   IVPB 400 milliGRAM(s) IV Intermittent once  ciprofloxacin   IVPB 400 milliGRAM(s) IV Intermittent every 12 hours  docusate sodium Liquid 100 milliGRAM(s) Oral two times a day  ferrous    sulfate 325 milliGRAM(s) Oral daily  pantoprazole    Tablet 40 milliGRAM(s) Oral before breakfast  sodium chloride 0.9%. 1000 milliLiter(s) (100 mL/Hr) IV Continuous <Continuous>    MEDICATIONS  (PRN):  ALBUTerol    90 MICROgram(s) HFA Inhaler 2 Puff(s) Inhalation every 6 hours PRN Shortness of Breath and/or Wheezing  diphenhydrAMINE 50 milliGRAM(s) Oral at bedtime PRN Sleep  diphenhydrAMINE 25 milliGRAM(s) Oral daily PRN Rash and/or Itching  oxyCODONE    5 mG/acetaminophen 325 mG 1 Tablet(s) Oral every 4 hours PRN Moderate Pain (4 - 6)      I&O's Summary    10 Jul 2019 07:01  -  11 Jul 2019 07:00  --------------------------------------------------------  IN: 900 mL / OUT: 975 mL / NET: -75 mL        Vital Signs Last 24 Hrs  T(C): 36.8 (11 Jul 2019 04:49), Max: 36.8 (11 Jul 2019 04:49)  T(F): 98.3 (11 Jul 2019 04:49), Max: 98.3 (11 Jul 2019 04:49)  HR: 66 (11 Jul 2019 05:22) (61 - 96)  BP: 122/78 (11 Jul 2019 04:49) (118/87 - 147/76)  BP(mean): 89 (10 Jul 2019 20:00) (89 - 108)  RR: 18 (11 Jul 2019 04:49) (14 - 18)  SpO2: 99% (11 Jul 2019 05:22) (95% - 100%)    PHYSICAL EXAM:  General: No acute distress, well-nourished  HEENT: Extraocular muscles intact, clear conjunctiva, pupils round and equal, reactive to light; normal oropharynx  Neck: Supple, no jugular vein distention  Lungs: Normal respiratory effort on room air; bilateral lung fields clear to auscultation; no wheezes or crackles.  Heart: Normal rate and regular rhythm; S1/S2 present; No murmurs, rubs or gallops appreciated; Capillary refill < 2 seconds  Abdomen: Soft, non-distended, mild tenderness at LLQ and suprapubic area, no masses; bowel sound present  Extremities: No lower extremity edema; no joint swelling; full range of motion  Neurology: Alert and oriented to person, time, place and situation. No focal deficits; strength and sensation grossly intact. Spontaneous movements of all 4 extremities.  Skin: Warm, dry and appropriate color for skin tone; no new rashes or lesions.       LABS:                        9.5    10.9  )-----------( 452      ( 11 Jul 2019 07:36 )             28.8       07-11    134<L>  |  96  |  8   ----------------------------<  100<H>  3.9   |  25  |  0.64    Ca    8.5      11 Jul 2019 07:36         RADIOLOGY & ADDITIONAL TESTS:  < from: IR Procedure (07.10.19 @ 11:00) >  IMPRESSION:  SUCCESSFUL BILATERAL UTERINE ARTERY EMBOLIZATION WITH EMBOSPHERE   PARTICLES AS DESCRIBED ABOVE.    < end of copied text >

## 2019-07-11 NOTE — DISCHARGE NOTE PROVIDER - CARE PROVIDER_API CALL
Kortney Ramsey  [address] , [place], [place], [address]  Phone: [phone]  Fax: (   )    -  Follow Up Time: 2 weeks    Melquiades Wallace)  Diagnostic Radiology; [facility]  [address] [place] [address]  Phone: [phone]  Fax: [phone]  Follow Up Time: 1 month Melquiades Wallace)  Diagnostic Radiology; [facility]  [address] [place] [address]  Phone: [phone]  Fax: [phone]  Follow Up Time: 1 month    Kortney Ramsey  [address] [place] [address] [address] [facility] , [place], [address]  Phone: [phone]  Fax: (   )    -  Follow Up Time: 2 weeks    Janki Mckinney  [facility]  [address] [place] [address]  Phone: [phone]  Fax: (   )    -  Follow Up Time: Routine

## 2019-07-11 NOTE — PROGRESS NOTE ADULT - PROBLEM SELECTOR PLAN 1
Pt was scheduled for IR embolization of uterine fibroid today; now s/p procedure  - no complications, hemodynamically stable  - as per IR - patient tolerated PO, no nausea, vomiting, and abdominal pain subsided. Ready for discharge; will d/c PCA pump and home with oral meds as per IR. Pt was scheduled for IR embolization of uterine fibroid 7/10; now s/p procedure  - no complications, hemodynamically stable  - as per IR - patient tolerated PO, no nausea, vomiting, and abdominal pain subsided. Ready for discharge; will d/c PCA pump and home with oral meds as per IR.  -H/H stable.  -Patient may have had a ?allergic reaction to toradol overnight. Now subsided. Tolerates Ibuprofen at home however.

## 2019-07-11 NOTE — DISCHARGE NOTE NURSING/CASE MANAGEMENT/SOCIAL WORK - NSDCFUADDAPPT_GEN_ALL_CORE_FT
Call [phone] to schedule appointment with Dr. Wallace in 4 weeks.  Will get MRI referral when follow up with Dr. Wallace.     Establish care with general internal medicine clinic for primary care.

## 2019-07-11 NOTE — DISCHARGE NOTE PROVIDER - PROVIDER TOKENS
FREE:[LAST:[Ramsey],FIRST:[Kortney],PHONE:[phone]],FAX:[(   )    -],ADDRESS:[[facility] [address] , [place], [place], [address]],FOLLOWUP:[2 weeks]],PROVIDER:[TOKEN:[[id number]],FOLLOWUP:[1 month]] PROVIDER:[TOKEN:[[id number]],FOLLOWUP:[1 month]],FREE:[LAST:[Ramsey],FIRST:[Kortney],PHONE:[phone]],FAX:[(   )    -],ADDRESS:[[address] [place] [address] Dr James [address] [place] [address], [address]],FOLLOWUP:[2 weeks]],FREE:[LAST:[Faye],FIRST:[Janki],PHONE:[phone]],FAX:[(   )    -],ADDRESS:[[place] [address]],FOLLOWUP:[Routine]]

## 2019-07-15 PROBLEM — D25.9 LEIOMYOMA OF UTERUS, UNSPECIFIED: Chronic | Status: ACTIVE | Noted: 2019-07-05

## 2019-07-15 PROBLEM — E03.9 HYPOTHYROIDISM, UNSPECIFIED: Chronic | Status: ACTIVE | Noted: 2019-07-05

## 2019-07-15 PROBLEM — N92.0 EXCESSIVE AND FREQUENT MENSTRUATION WITH REGULAR CYCLE: Chronic | Status: ACTIVE | Noted: 2019-07-05

## 2019-08-13 ENCOUNTER — APPOINTMENT (OUTPATIENT)
Dept: INTERVENTIONAL RADIOLOGY/VASCULAR | Facility: CLINIC | Age: 44
End: 2019-08-13
Payer: COMMERCIAL

## 2019-08-13 VITALS
HEART RATE: 83 BPM | SYSTOLIC BLOOD PRESSURE: 141 MMHG | TEMPERATURE: 98.4 F | OXYGEN SATURATION: 96 % | BODY MASS INDEX: 42.68 KG/M2 | DIASTOLIC BLOOD PRESSURE: 91 MMHG | HEIGHT: 64 IN | WEIGHT: 250 LBS

## 2019-08-13 DIAGNOSIS — R35.1 NOCTURIA: ICD-10-CM

## 2019-08-13 DIAGNOSIS — Z87.42 PERSONAL HISTORY OF OTHER DISEASES OF THE FEMALE GENITAL TRACT: ICD-10-CM

## 2019-08-13 PROCEDURE — 99214 OFFICE O/P EST MOD 30 MIN: CPT

## 2019-08-13 RX ORDER — FERROUS SULFATE 325(65) MG
325 (65 FE) TABLET ORAL
Refills: 0 | Status: ACTIVE | COMMUNITY

## 2019-08-21 ENCOUNTER — NON-APPOINTMENT (OUTPATIENT)
Age: 44
End: 2019-08-21

## 2019-08-21 ENCOUNTER — APPOINTMENT (OUTPATIENT)
Dept: PULMONOLOGY | Facility: CLINIC | Age: 44
End: 2019-08-21
Payer: COMMERCIAL

## 2019-08-21 VITALS
RESPIRATION RATE: 16 BRPM | BODY MASS INDEX: 42.17 KG/M2 | OXYGEN SATURATION: 98 % | DIASTOLIC BLOOD PRESSURE: 82 MMHG | SYSTOLIC BLOOD PRESSURE: 130 MMHG | HEART RATE: 89 BPM | HEIGHT: 64 IN | WEIGHT: 247 LBS

## 2019-08-21 PROCEDURE — 99214 OFFICE O/P EST MOD 30 MIN: CPT | Mod: 25

## 2019-08-21 PROCEDURE — 94010 BREATHING CAPACITY TEST: CPT

## 2019-08-21 NOTE — PROCEDURE
[FreeTextEntry1] : PFT - spi reveals mild restrictive dysfunction; FEV1 is 2.22 which is 74% of predicted, normal flow volume loop \par \par *FeNO not covered by insurance*

## 2019-08-21 NOTE — ADDENDUM
[FreeTextEntry1] : All medical record entries made by miguel Guillen were at Dr. Rodrigue Elizabeth's, direction and personally dictated by me on 08/21/2019. I have reviewed the chart and agree that the record accurately reflects my personal performance of the history, physical exam, assessment and plan. I have also personally directed, reviewed, and agree with the discharge instructions.

## 2019-08-21 NOTE — ASSESSMENT
[FreeTextEntry1] : Ms. Edwards is a 43 year old female with a history of asthma, allergies, GERD, OSAS, hypothyroidism, arthritis, endometriosis, and obesity. She presents to the office for pulmonary s/p gynecological surgery (successful). She is currently stable from a pulmonary perspective.  \par \par Her SOB is due to: \par -anemia\par -asthma\par -obesity\par -poor mechanics of breathing \par -out of shape over weight\par \par Problem 1: Severe persistent asthma\par -controlled at this time \par -continue Symbicort 160 (2 puffs BID) \par -continue Qvar 80 (2 puffs BID) \par -continue Singulair 10 mg at bedtime \par - Continue DuoNeb via Hand held nebulizer prn \par \par Asthma is believed to be caused by inherited (genetic) and environmental factor, but its exact cause is unknown. Asthma may be triggered by allergens, lung infections, or irritants in the air. Asthma triggers are different for each person \par -Inhaler technique reviewed as well as oral hygiene techniques reviewed with patient. Avoidance of cold air, extremes of temperature, rescue inhaler should be used before exercise. Order of medication reviewed with patient. Recommended use of a cool mist humidifier in the bedroom \par \par problem 2: overweight\par Weight loss, exercise, and diet control were discussed and are highly encouraged. Treatment options were given such as, aqua therapy, and contacting a nutritionist. Recommended to use the elliptical, stationary bike, less use of treadmill. Mindful eating was explained to the patient Obesity is associated with worsening asthma, shortness of breath, and potential for cardiac disease, diabetes, and other underlying medical conditions. \par \par problem 3: poor breathing mechanics\par -Proper breathing techniques were reviewed with an emphasis of exhalation. Patient instructed to breath in for 1 second and out for four seconds. Patient was encouraged to not talk while walking.\par \par Problem 4: Anemia - s/p gynecological embolization\par -Hematin Anemia guard QD\par \par Problem 5: allergies/sinus\par -continue Qnasl 1 sniff BID \par -recommended to use "Navage" nasal rinse device \par -continue Astelin 0.15 1 sniff BID\par \par Environmental measures for allergies were encouraged including mattress and pillow cover, air purifier, and environmental controls.\par \par Problem 6:  GERD\par -continue Nexium 40 mg QD at breakfast \par -Rule of 2s: avoid eating too much, eating too late, eating too spicy, eating two hours before bed\par -Things to avoid including overeating, spicy foods, tight clothing, eating within three hours of bed, this list is not all inclusive. \par -For treatment of reflux, possible options discussed including diet control, H2 blockers, PPIs, as well as coating motility agents discussed as treatment options. Timing of meals and proximity of last meal to sleep were discussed. If symptoms persist, a formal gastrointestinal evaluation is needed. \par \par Problem 7: low vitamin D\par -Continue on supplemental vitamin D \par \par Problem 8: OSAS\par -continue to use CPAP every night, benefiting well \par - -Discussed the risks/associations with coronary artery disease, atrial fibrillation, arrhythmia, memory loss, issues with concentration, stroke risk, hypertension, nocturia, chronic reflux/Ferrara’s esophagus some but not all inclusive. Treatment options discussed including CPAP/BiPAP machine, oral appliance, ProVent therapy, Oxy-Aid by Respitec, new technologies, or positional sleep.\par \par problem 9: Pulmonary Pre-Op clearance for GYN surgery\par -at this point in time there are no absolute pulmonary contraindications to go forward with the planned procedure  \par -at the time of surgery s/he should have optimal pain control, incentive spirometry, early ambulation, DVT and GI prophylaxis. \par \par problem 10: elevated eosinophil level (not elevated) \par -candidate for Nucala or Faserna\par \par -The safety and efficacy of Nucala was established in three double-blind, randomized, placebo-controlled trials in patients with severe asthma. Compared to a placebo, patients with severe asthma receiving Nucala had fewer exacerbation requiring hospitalization and/or emergency department visits, and a longer time to first exacerbation. In addition, patients with severe asthma receiving Nucala experienced greater reductions in their daily maintenance oral corticosteroid dose, while maintaining asthma control compared with patients receiving placebo. Treatment with Nucala did not result in a significant improvement in lung function, as measured by the volume of air exhaled by patients in one second. The most common side effects include: headache, injection site reactions, back pain, weakness, and fatigue; hypersensitivity reactions can occur within hours or days including swelling of the face, mouth, and tongue, fainting, dizziness, hives, breathing problems, and rash; herpes zoster infections have occurred. The drug is a monoclonal antibody that inhibits interleukin-5 which helps regular eosinophils, a type of white blood cell that contributes to asthma. The over-production of eosinophils can cause inflammation in the lungs, increasing the frequency of asthma attacks. Patients must also take other medications, including high dose inhaled corticosteroids and at least one additional asthma drug.\par \par problem 11: elevated IgE level\par -failed Xolair\par \par problem 12: health maintenance \par -recommended yearly flu shot after October 15 - 2018\par -recommended strep pneumonia vaccines: Prevnar-13 vaccine, followed by Pneumo vaccine 23 one year following\par -recommended early intervention for URIs\par -recommended regular osteoporosis evaluations\par -recommended early dermatological evaluations\par -recommended after the age of 50 to the age of 70, colonoscopy every 5 years \par Recommended that she see a Psychologist to help her with her get back to "herself" \par \par F/u in 3-4 months - SPI (FeNO not covered, do not do.)\par pt is encouraged to call or fax the office with any questions or concerns. \par Pt is to exercise and diet to promote a healthy weight. \par Explained to the pt in full detail with demonstrations how to use the inhalers and inhaler hygiene.

## 2019-08-21 NOTE — PHYSICAL EXAM
[Normal Appearance] : normal appearance [General Appearance - Well Developed] : well developed [General Appearance - Well Nourished] : well nourished [Well Groomed] : well groomed [No Deformities] : no deformities [General Appearance - In No Acute Distress] : no acute distress [Normal Conjunctiva] : the conjunctiva exhibited no abnormalities [Normal Oropharynx] : normal oropharynx [Eyelids - No Xanthelasma] : the eyelids demonstrated no xanthelasmas [III] : III [Neck Appearance] : the appearance of the neck was normal [Jugular Venous Distention Increased] : there was no jugular-venous distention [Neck Cervical Mass (___cm)] : no neck mass was observed [Thyroid Nodule] : there were no palpable thyroid nodules [Thyroid Diffuse Enlargement] : the thyroid was not enlarged [Heart Rate And Rhythm] : heart rate and rhythm were normal [Heart Sounds] : normal S1 and S2 [Murmurs] : no murmurs present [Exaggerated Use Of Accessory Muscles For Inspiration] : no accessory muscle use [Respiration, Rhythm And Depth] : normal respiratory rhythm and effort [Auscultation Breath Sounds / Voice Sounds] : lungs were clear to auscultation bilaterally [Abdomen Soft] : soft [Abdomen Tenderness] : non-tender [Abdomen Mass (___ Cm)] : no abdominal mass palpated [Abnormal Walk] : normal gait [Gait - Sufficient For Exercise Testing] : the gait was sufficient for exercise testing [Nail Clubbing] : no clubbing of the fingernails [Cyanosis, Localized] : no localized cyanosis [Petechial Hemorrhages (___cm)] : no petechial hemorrhages [Skin Color & Pigmentation] : normal skin color and pigmentation [] : no rash [No Venous Stasis] : no venous stasis [Skin Lesions] : no skin lesions [No Skin Ulcers] : no skin ulcer [No Xanthoma] : no  xanthoma was observed [Sensation] : the sensory exam was normal to light touch and pinprick [Deep Tendon Reflexes (DTR)] : deep tendon reflexes were 2+ and symmetric [Oriented To Time, Place, And Person] : oriented to person, place, and time [No Focal Deficits] : no focal deficits [Affect] : the affect was normal [Impaired Insight] : insight and judgment were intact [FreeTextEntry1] : I:E ratio 1:3; clear

## 2019-08-21 NOTE — HISTORY OF PRESENT ILLNESS
[FreeTextEntry1] : Ms. Edwards is a 43 year old female with a history of allergic rhinitis, severe persistent asthma, GERD, eosinophilia, nocturia, sleep apnea, SOB and vitamin D deficiency presenting to the office today for a follow up visit. Her chief complaint is sinus headaches. \par -She states that she has generally been feeling well and her energy level has been good. she rates her energy level a 7/10\par -she notes that she has been sleeping well and using her CPAP machine every night for the entire night (at least 8 hours)\par -she has not been exercising regularly as she recently just had uterine fibroid embolization surgery and has not recently been cleared for activity \par -she states that her bowels have been regular\par -she reports that her sense of taste and smell have been good \par -her allergies have been under control\par -she has been using OTC Nexium to control her reflux\par -she has been getting mild arthritic pains \par -she notes that she has been getting frequent sinus headaches / pressure\par -she denies any nausea, vomiting, fever, chills, sweats, chest pain, chest pressure, diarrhea, constipation, dysphagia, dizziness, leg swelling, leg pain, itchy eyes, itchy ears, heartburn, reflux, or sour taste in the mouth.

## 2019-11-21 ENCOUNTER — RX CHANGE (OUTPATIENT)
Age: 44
End: 2019-11-21

## 2019-12-03 ENCOUNTER — APPOINTMENT (OUTPATIENT)
Dept: PULMONOLOGY | Facility: CLINIC | Age: 44
End: 2019-12-03
Payer: COMMERCIAL

## 2019-12-03 VITALS
BODY MASS INDEX: 25.27 KG/M2 | SYSTOLIC BLOOD PRESSURE: 140 MMHG | HEART RATE: 113 BPM | OXYGEN SATURATION: 98 % | RESPIRATION RATE: 16 BRPM | DIASTOLIC BLOOD PRESSURE: 80 MMHG | WEIGHT: 148 LBS | HEIGHT: 64 IN

## 2019-12-03 DIAGNOSIS — J34.89 OTHER SPECIFIED DISORDERS OF NOSE AND NASAL SINUSES: ICD-10-CM

## 2019-12-03 PROCEDURE — 96372 THER/PROPH/DIAG INJ SC/IM: CPT

## 2019-12-03 PROCEDURE — 99215 OFFICE O/P EST HI 40 MIN: CPT | Mod: 25

## 2019-12-03 RX ORDER — MONTELUKAST 10 MG/1
10 TABLET, FILM COATED ORAL
Qty: 30 | Refills: 3 | Status: ACTIVE | COMMUNITY
Start: 2019-12-03 | End: 1900-01-01

## 2019-12-03 RX ORDER — METHYLPRED ACET/NACL,ISO-OS/PF 40 MG/ML
40 VIAL (ML) INJECTION
Qty: 1 | Refills: 0 | Status: COMPLETED | OUTPATIENT
Start: 2019-12-03

## 2019-12-03 RX ADMIN — Medication MG/ML: at 00:00

## 2019-12-03 NOTE — REVIEW OF SYSTEMS
[Fever] : no fever [Chills] : no chills [Poor Appetite] : poor appetite [Fatigue] : fatigue [Nasal Congestion] : nasal congestion [Postnasal Drip] : postnasal drip [Sinus Problems] : sinus problems [Sore Throat] : sore throat [Cough] : cough [Sputum] : not coughing up ~M sputum [Dyspnea] : dyspnea [Chest Tightness] : chest tightness [Wheezing] : wheezing [Heartburn] : no heartburn [Reflux] : reflux [Vomiting] : vomiting [Constipation] : no constipation [Diarrhea] : no diarrhea [Abdominal Pain] : no abdominal pain [Difficulty Initiating Sleep] : difficulty falling asleep [As Noted in HPI] : as noted in HPI [Headache] : headache [Difficulty Maintaining Sleep] : difficulty maintaining sleep [Nonrestorative Sleep] : nonrestorative sleep [Negative] : Cardiovascular [de-identified] : due to cough

## 2019-12-03 NOTE — ASSESSMENT
[FreeTextEntry1] : Ms. Edwards is a 44 year old female with a history of severe persistent asthma, allergies, GERD, OSAS, hypothyroidism, arthritis, endometriosis, and obesity. She presents to the office for pulmonary f/u SP ER visit for asthma exacerbation. \par \par Problem 1: Acute bronchitis/Sinusitis\par -add Doxycycline 100 mg PO BID x 10 days\par \par Problem 2: Severe persistent asthma with severe exacerbation\par -Medrol injection now RUE\par -Prednisone 40 mg x 5 days, 30 mg x 5 days, 20 mg x 5 days, 10 mg x 5 days\par -Duoneb via neb QID PRN SOB\par -add Perforomist via q 12 hours via neb\par -add Budesonide .5mg via neb q 12 hours rinse and gargle\par -add Spiriva respimat 2 puffs in am \par -add Qvar 80 2 puffs in am and pm rinse and gargle\par -HOLD SYMBICORT\par -restart Singulair 10 mg at bedtime \par \par problem 3:Post nasal drip, severe sinus pressure and congestion\par -add OTC phenylephrine nasal spray (no hx of htn)\par -add azelastine nasal spray 2 sprays in am and pm\par \par Problem 4:  GERD flare\par -continue Nexium 40 mg QD at breakfast \par -add famotidine 40 mg PO QHS\par -Rule of 2s: avoid eating too much, eating too late, eating too spicy, eating two hours before bed\par -Things to avoid including overeating, spicy foods, tight clothing, eating within three hours of bed, this list is not all \par \par problem 5: poor breathing mechanics\par -Proper breathing techniques were reviewed with an emphasis of exhalation. Patient instructed to breath in for 1 second and out for four seconds. Patient was encouraged to not talk while walking.\par \par Problem 6: OSAS\par -continue to use CPAP every night, benefiting well, compliant with use\par \par problem 7: elevated IgE level\par -failed Xolair\par \par F/u as scheduled in Jan with Dr. Elizabeth\par pt to call the office with an update Monday\par pt to call earlier if worsening- she was agreeable

## 2019-12-03 NOTE — PHYSICAL EXAM
[General Appearance - Well Developed] : well developed [Normal Appearance] : normal appearance [General Appearance - Well Nourished] : well nourished [Well Groomed] : well groomed [General Appearance - In No Acute Distress] : no acute distress [No Deformities] : no deformities [Eyelids - No Xanthelasma] : the eyelids demonstrated no xanthelasmas [Normal Conjunctiva] : the conjunctiva exhibited no abnormalities [III] : III [Neck Appearance] : the appearance of the neck was normal [Normal Oropharynx] : normal oropharynx [Neck Cervical Mass (___cm)] : no neck mass was observed [Jugular Venous Distention Increased] : there was no jugular-venous distention [Heart Rate And Rhythm] : heart rate and rhythm were normal [Heart Sounds] : normal S1 and S2 [Murmurs] : no murmurs present [Respiration, Rhythm And Depth] : normal respiratory rhythm and effort [Exaggerated Use Of Accessory Muscles For Inspiration] : no accessory muscle use [Abdomen Soft] : soft [Abnormal Walk] : normal gait [Cyanosis, Localized] : no localized cyanosis [Nail Clubbing] : no clubbing of the fingernails [Gait - Sufficient For Exercise Testing] : the gait was sufficient for exercise testing [Skin Color & Pigmentation] : normal skin color and pigmentation [] : no rash [Deep Tendon Reflexes (DTR)] : deep tendon reflexes were 2+ and symmetric [No Focal Deficits] : no focal deficits [Sensation] : the sensory exam was normal to light touch and pinprick [Impaired Insight] : insight and judgment were intact [Oriented To Time, Place, And Person] : oriented to person, place, and time [Affect] : the affect was normal [FreeTextEntry1] : I:E ratio 1:3; minor forced scattered wheeze [Mood] : the mood was normal

## 2019-12-03 NOTE — HISTORY OF PRESENT ILLNESS
[FreeTextEntry1] : Ms. Edwards is a 43 year old female with a history of allergic rhinitis, severe persistent asthma, GERD, eosinophilia, nocturia, sleep apnea, SOB and vitamin D deficiency presenting to the office today for a sick visit s/p ER visit. \par \par She reports she was feeling unwell 1.5 weeks ago with cold like symptoms and mild cough. She called the office to report her symptoms and was placed on prednisone 20 mg x 7 days and 10 mg x7 days. She then traveled to [facility] for thanksgiving and upon return on Saturday her symptoms started to worsen with worsening cough, SOB, chest pressure and tightness. By sunday she was dealing with sinus symptoms as well- sinus pressure, pain and post nasal drip. Her breathing got progressively worse and called here Monday. She was upped to 40 mg of pred and told to restart nebs. She did not get relief and reported to [facility]. She was given IV solumedrol and multiple duoneb treatments. CXR there was clear. \par \par She was told to follow up with Pulm and sent home on 40 mg of prednisone. \par She reports feeling about 20% better with her breathiness. \par She is able to speak full sentances without being breathy. \par Her cough is severe but dry. She is coughing to the point of gagging and sometimes throwing up. \par Her Reflux has flared. She is unable to sleep and continues to feel fatigued from cough fits. Her chest, back and stomach muscles feel sore from coughing. \par She was using CPAP nightly but was unsure if it was worsening her cough. \par \par Prior to this she was well controlled on Symbicort 160 2 BID. She reports stopping dairy products has been helpful in controlling her asthma. She admits to having some for thanksgiving. \par She denies fever, chills, sore throat. \par She denies Chest pain, dizziness, aches or pains aside from related to cough. Denies LE edema. \par She denies abdominal pain, sour taste in the mouth or heartburn. She does feel some reflux.

## 2019-12-05 ENCOUNTER — INPATIENT (INPATIENT)
Facility: HOSPITAL | Age: 44
LOS: 4 days | Discharge: HOME | DRG: 202 | End: 2019-12-10
Attending: HOSPITALIST | Admitting: HOSPITALIST
Payer: COMMERCIAL

## 2019-12-05 VITALS
HEART RATE: 97 BPM | TEMPERATURE: 99 F | OXYGEN SATURATION: 97 % | HEIGHT: 64 IN | WEIGHT: 250 LBS | RESPIRATION RATE: 18 BRPM | SYSTOLIC BLOOD PRESSURE: 163 MMHG | DIASTOLIC BLOOD PRESSURE: 98 MMHG

## 2019-12-05 DIAGNOSIS — D50.9 IRON DEFICIENCY ANEMIA, UNSPECIFIED: ICD-10-CM

## 2019-12-05 DIAGNOSIS — J45.901 UNSPECIFIED ASTHMA WITH (ACUTE) EXACERBATION: ICD-10-CM

## 2019-12-05 DIAGNOSIS — G47.33 OBSTRUCTIVE SLEEP APNEA (ADULT) (PEDIATRIC): ICD-10-CM

## 2019-12-05 DIAGNOSIS — Z87.19 PERSONAL HISTORY OF OTHER DISEASES OF THE DIGESTIVE SYSTEM: ICD-10-CM

## 2019-12-05 DIAGNOSIS — Z98.890 OTHER SPECIFIED POSTPROCEDURAL STATES: Chronic | ICD-10-CM

## 2019-12-05 DIAGNOSIS — Z29.9 ENCOUNTER FOR PROPHYLACTIC MEASURES, UNSPECIFIED: ICD-10-CM

## 2019-12-05 LAB
ALBUMIN SERPL ELPH-MCNC: 4.5 G/DL — SIGNIFICANT CHANGE UP (ref 3.3–5)
ALP SERPL-CCNC: 65 U/L — SIGNIFICANT CHANGE UP (ref 40–120)
ALT FLD-CCNC: 23 U/L — SIGNIFICANT CHANGE UP (ref 10–45)
ANION GAP SERPL CALC-SCNC: 17 MMOL/L — SIGNIFICANT CHANGE UP (ref 5–17)
APPEARANCE UR: CLEAR — SIGNIFICANT CHANGE UP
AST SERPL-CCNC: 29 U/L — SIGNIFICANT CHANGE UP (ref 10–40)
BACTERIA # UR AUTO: ABNORMAL
BASOPHILS # BLD AUTO: 0 K/UL — SIGNIFICANT CHANGE UP (ref 0–0.2)
BASOPHILS NFR BLD AUTO: 0 % — SIGNIFICANT CHANGE UP (ref 0–2)
BILIRUB SERPL-MCNC: 0.2 MG/DL — SIGNIFICANT CHANGE UP (ref 0.2–1.2)
BILIRUB UR-MCNC: NEGATIVE — SIGNIFICANT CHANGE UP
BUN SERPL-MCNC: 19 MG/DL — SIGNIFICANT CHANGE UP (ref 7–23)
CALCIUM SERPL-MCNC: 9.5 MG/DL — SIGNIFICANT CHANGE UP (ref 8.4–10.5)
CHLORIDE SERPL-SCNC: 101 MMOL/L — SIGNIFICANT CHANGE UP (ref 96–108)
CO2 SERPL-SCNC: 24 MMOL/L — SIGNIFICANT CHANGE UP (ref 22–31)
COLOR SPEC: SIGNIFICANT CHANGE UP
CREAT SERPL-MCNC: 0.74 MG/DL — SIGNIFICANT CHANGE UP (ref 0.5–1.3)
DIFF PNL FLD: ABNORMAL
EOSINOPHIL # BLD AUTO: 0 K/UL — SIGNIFICANT CHANGE UP (ref 0–0.5)
EOSINOPHIL NFR BLD AUTO: 0 % — SIGNIFICANT CHANGE UP (ref 0–6)
EPI CELLS # UR: 3 /HPF — SIGNIFICANT CHANGE UP
GAS PNL BLDV: SIGNIFICANT CHANGE UP
GLUCOSE SERPL-MCNC: 123 MG/DL — HIGH (ref 70–99)
GLUCOSE UR QL: NEGATIVE — SIGNIFICANT CHANGE UP
HCG UR QL: NEGATIVE — SIGNIFICANT CHANGE UP
HCT VFR BLD CALC: 31.1 % — LOW (ref 34.5–45)
HGB BLD-MCNC: 9 G/DL — LOW (ref 11.5–15.5)
HPIV4 RNA SPEC QL NAA+PROBE: DETECTED
HYALINE CASTS # UR AUTO: 0 /LPF — SIGNIFICANT CHANGE UP (ref 0–2)
KETONES UR-MCNC: NEGATIVE — SIGNIFICANT CHANGE UP
LEUKOCYTE ESTERASE UR-ACNC: NEGATIVE — SIGNIFICANT CHANGE UP
LYMPHOCYTES # BLD AUTO: 1.35 K/UL — SIGNIFICANT CHANGE UP (ref 1–3.3)
LYMPHOCYTES # BLD AUTO: 13 % — SIGNIFICANT CHANGE UP (ref 13–44)
MCHC RBC-ENTMCNC: 21.8 PG — LOW (ref 27–34)
MCHC RBC-ENTMCNC: 28.9 GM/DL — LOW (ref 32–36)
MCV RBC AUTO: 75.3 FL — LOW (ref 80–100)
MONOCYTES # BLD AUTO: 0.42 K/UL — SIGNIFICANT CHANGE UP (ref 0–0.9)
MONOCYTES NFR BLD AUTO: 4 % — SIGNIFICANT CHANGE UP (ref 2–14)
NEUTROPHILS # BLD AUTO: 8.41 K/UL — HIGH (ref 1.8–7.4)
NEUTROPHILS NFR BLD AUTO: 78 % — HIGH (ref 43–77)
NITRITE UR-MCNC: NEGATIVE — SIGNIFICANT CHANGE UP
PH UR: 7 — SIGNIFICANT CHANGE UP (ref 5–8)
PLATELET # BLD AUTO: 576 K/UL — HIGH (ref 150–400)
POTASSIUM SERPL-MCNC: 3.9 MMOL/L — SIGNIFICANT CHANGE UP (ref 3.5–5.3)
POTASSIUM SERPL-SCNC: 3.9 MMOL/L — SIGNIFICANT CHANGE UP (ref 3.5–5.3)
PROT SERPL-MCNC: 8.1 G/DL — SIGNIFICANT CHANGE UP (ref 6–8.3)
PROT UR-MCNC: ABNORMAL
RAPID RVP RESULT: DETECTED
RBC # BLD: 4.13 M/UL — SIGNIFICANT CHANGE UP (ref 3.8–5.2)
RBC # FLD: 18.4 % — HIGH (ref 10.3–14.5)
RBC CASTS # UR COMP ASSIST: 10 /HPF — HIGH (ref 0–4)
SODIUM SERPL-SCNC: 142 MMOL/L — SIGNIFICANT CHANGE UP (ref 135–145)
SP GR SPEC: 1.03 — HIGH (ref 1.01–1.02)
UROBILINOGEN FLD QL: NEGATIVE — SIGNIFICANT CHANGE UP
WBC # BLD: 10.38 K/UL — SIGNIFICANT CHANGE UP (ref 3.8–10.5)
WBC # FLD AUTO: 10.38 K/UL — SIGNIFICANT CHANGE UP (ref 3.8–10.5)
WBC UR QL: 1 /HPF — SIGNIFICANT CHANGE UP (ref 0–5)

## 2019-12-05 PROCEDURE — 99223 1ST HOSP IP/OBS HIGH 75: CPT | Mod: GC

## 2019-12-05 PROCEDURE — 99223 1ST HOSP IP/OBS HIGH 75: CPT

## 2019-12-05 PROCEDURE — 99285 EMERGENCY DEPT VISIT HI MDM: CPT

## 2019-12-05 PROCEDURE — 71046 X-RAY EXAM CHEST 2 VIEWS: CPT | Mod: 26

## 2019-12-05 RX ORDER — ALBUTEROL 90 UG/1
2.5 AEROSOL, METERED ORAL
Refills: 0 | Status: COMPLETED | OUTPATIENT
Start: 2019-12-05 | End: 2019-12-05

## 2019-12-05 RX ORDER — BUDESONIDE, MICRONIZED 100 %
2 POWDER (GRAM) MISCELLANEOUS
Qty: 0 | Refills: 0 | DISCHARGE

## 2019-12-05 RX ORDER — SODIUM CHLORIDE 9 MG/ML
1000 INJECTION INTRAMUSCULAR; INTRAVENOUS; SUBCUTANEOUS ONCE
Refills: 0 | Status: COMPLETED | OUTPATIENT
Start: 2019-12-05 | End: 2019-12-05

## 2019-12-05 RX ORDER — INFLUENZA VIRUS VACCINE 15; 15; 15; 15 UG/.5ML; UG/.5ML; UG/.5ML; UG/.5ML
0.5 SUSPENSION INTRAMUSCULAR ONCE
Refills: 0 | Status: COMPLETED | OUTPATIENT
Start: 2019-12-05 | End: 2019-12-05

## 2019-12-05 RX ORDER — SODIUM CHLORIDE 9 MG/ML
5 INJECTION INTRAMUSCULAR; INTRAVENOUS; SUBCUTANEOUS
Refills: 0 | Status: DISCONTINUED | OUTPATIENT
Start: 2019-12-05 | End: 2019-12-10

## 2019-12-05 RX ORDER — IPRATROPIUM/ALBUTEROL SULFATE 18-103MCG
3 AEROSOL WITH ADAPTER (GRAM) INHALATION EVERY 6 HOURS
Refills: 0 | Status: DISCONTINUED | OUTPATIENT
Start: 2019-12-05 | End: 2019-12-10

## 2019-12-05 RX ORDER — TIOTROPIUM BROMIDE 18 UG/1
1 CAPSULE ORAL; RESPIRATORY (INHALATION) DAILY
Refills: 0 | Status: DISCONTINUED | OUTPATIENT
Start: 2019-12-05 | End: 2019-12-05

## 2019-12-05 RX ORDER — BUDESONIDE AND FORMOTEROL FUMARATE DIHYDRATE 160; 4.5 UG/1; UG/1
2 AEROSOL RESPIRATORY (INHALATION)
Refills: 0 | Status: DISCONTINUED | OUTPATIENT
Start: 2019-12-05 | End: 2019-12-07

## 2019-12-05 RX ORDER — PANTOPRAZOLE SODIUM 20 MG/1
40 TABLET, DELAYED RELEASE ORAL
Refills: 0 | Status: DISCONTINUED | OUTPATIENT
Start: 2019-12-05 | End: 2019-12-10

## 2019-12-05 RX ORDER — IPRATROPIUM/ALBUTEROL SULFATE 18-103MCG
3 AEROSOL WITH ADAPTER (GRAM) INHALATION
Refills: 0 | Status: COMPLETED | OUTPATIENT
Start: 2019-12-05 | End: 2019-12-05

## 2019-12-05 RX ORDER — ENOXAPARIN SODIUM 100 MG/ML
40 INJECTION SUBCUTANEOUS DAILY
Refills: 0 | Status: DISCONTINUED | OUTPATIENT
Start: 2019-12-05 | End: 2019-12-10

## 2019-12-05 RX ORDER — IPRATROPIUM/ALBUTEROL SULFATE 18-103MCG
3 AEROSOL WITH ADAPTER (GRAM) INHALATION
Qty: 0 | Refills: 0 | DISCHARGE

## 2019-12-05 RX ORDER — ESOMEPRAZOLE MAGNESIUM 40 MG/1
1 CAPSULE, DELAYED RELEASE ORAL
Qty: 0 | Refills: 0 | DISCHARGE

## 2019-12-05 RX ORDER — FLUTICASONE PROPIONATE 50 MCG
1 SPRAY, SUSPENSION NASAL
Refills: 0 | Status: DISCONTINUED | OUTPATIENT
Start: 2019-12-05 | End: 2019-12-10

## 2019-12-05 RX ORDER — MONTELUKAST 4 MG/1
10 TABLET, CHEWABLE ORAL DAILY
Refills: 0 | Status: DISCONTINUED | OUTPATIENT
Start: 2019-12-05 | End: 2019-12-10

## 2019-12-05 RX ORDER — CHOLECALCIFEROL (VITAMIN D3) 125 MCG
1 CAPSULE ORAL
Qty: 0 | Refills: 0 | DISCHARGE

## 2019-12-05 RX ADMIN — BUDESONIDE AND FORMOTEROL FUMARATE DIHYDRATE 2 PUFF(S): 160; 4.5 AEROSOL RESPIRATORY (INHALATION) at 21:13

## 2019-12-05 RX ADMIN — Medication 3 MILLILITER(S): at 13:25

## 2019-12-05 RX ADMIN — ALBUTEROL 2.5 MILLIGRAM(S): 90 AEROSOL, METERED ORAL at 14:05

## 2019-12-05 RX ADMIN — SODIUM CHLORIDE 1000 MILLILITER(S): 9 INJECTION INTRAMUSCULAR; INTRAVENOUS; SUBCUTANEOUS at 15:56

## 2019-12-05 RX ADMIN — Medication 3 MILLILITER(S): at 13:30

## 2019-12-05 RX ADMIN — Medication 40 MILLIGRAM(S): at 15:56

## 2019-12-05 RX ADMIN — Medication 1 SPRAY(S): at 21:14

## 2019-12-05 RX ADMIN — ALBUTEROL 2.5 MILLIGRAM(S): 90 AEROSOL, METERED ORAL at 14:04

## 2019-12-05 RX ADMIN — MONTELUKAST 10 MILLIGRAM(S): 4 TABLET, CHEWABLE ORAL at 21:14

## 2019-12-05 RX ADMIN — Medication 100 MILLIGRAM(S): at 21:13

## 2019-12-05 RX ADMIN — Medication 3 MILLILITER(S): at 13:43

## 2019-12-05 RX ADMIN — PANTOPRAZOLE SODIUM 40 MILLIGRAM(S): 20 TABLET, DELAYED RELEASE ORAL at 18:44

## 2019-12-05 RX ADMIN — SODIUM CHLORIDE 5 MILLILITER(S): 9 INJECTION INTRAMUSCULAR; INTRAVENOUS; SUBCUTANEOUS at 21:14

## 2019-12-05 RX ADMIN — Medication 3 MILLILITER(S): at 18:43

## 2019-12-05 NOTE — ED ADULT TRIAGE NOTE - CHIEF COMPLAINT QUOTE
asthma exacerbation. has been followed with pulmonologist, on nebulizer and steroids. worsening symptoms with minimal improvement at home.

## 2019-12-05 NOTE — H&P ADULT - NSHPLABSRESULTS_GEN_ALL_CORE
142  |  101  |  19  ----------------------------<  123<H>  3.9   |  24  |  0.74    Ca    9.5      05 Dec 2019 14:08    TPro  8.1  /  Alb  4.5  /  TBili  0.2  /  DBili  x   /  AST  29  /  ALT  23  /  AlkPhos  65  12                    Urinalysis Basic - ( 05 Dec 2019 14:08 )    Color: Light Yellow / Appearance: Clear / S.029 / pH: x  Gluc: x / Ketone: Negative  / Bili: Negative / Urobili: Negative   Blood: x / Protein: Trace / Nitrite: Negative   Leuk Esterase: Negative / RBC: 10 /hpf / WBC 1 /HPF   Sq Epi: x / Non Sq Epi: 3 /hpf / Bacteria: Few                              9.0    10.38 )-----------( 576      ( 05 Dec 2019 14:37 )             31.1     CAPILLARY BLOOD GLUCOSE      Radiology:  EXAM:  XR CHEST PA LAT 2V                        PROCEDURE DATE:  2019  IMPRESSION:    Stable chest.  Clear lungs.

## 2019-12-05 NOTE — ED PROVIDER NOTE - PHYSICAL EXAMINATION
GEN: Pt in NAD, able to speak but has to stop to take breath mid-sentence, frequent bouts of coughing, A&O x3.  PSYCH: Affect appropriate.  EYES: Sclera white w/o injection.   ENT: Head NCAT. Nose w/o deformity. No auricular TTP. MMM. Neck supple FROM.   RESP: No chest wall tenderness, diffuse expiratory wheeze, rhonchi- clears with cough, no rales.  CARDIAC: RRR, clear distinct S1, S2, no appreciable murmurs.  ABD: Abdomen soft, non-tender. No CVAT b/l.  VASC: 2+ radial and dorsalis pedis pulses b/l. No edema or calf tenderness.  SKIN: No rashes on the trunk.

## 2019-12-05 NOTE — ED ADULT NURSE NOTE - OBJECTIVE STATEMENT
45 y/o female with pmhx of moderate persistent asthma sent by pulmonologist for uncontrolled episode of acute asthma attack.  per pt, she was being tx for URI over the past 2 weeks that exacerbated her asthma causing persistent wheezing with non productive cough, even with abx, multiple nebs, LIBAN, LABA and steroids both po and IV with no relief.  upon arrival to ed, pt is sob with expiratory wheezing in all lung fields and rhonchi noted during dry cough.  pt is awake, alert and responsive to all stimuli.  no respiratory distress noted.  vss.  saO2=98% ra and HR=85.  pt denies any fevers, chills or pain at this time.  safety precautions in place.  will continue to monitor.

## 2019-12-05 NOTE — H&P ADULT - PROBLEM SELECTOR PLAN 3
Hb 9, baseline 9-10  Iron studies Hb 9, baseline 9-10  Iron studies  No syncope, acitve bleeding, indication for transfusion at this time   will maintain active Type and screen

## 2019-12-05 NOTE — ED ADULT NURSE REASSESSMENT NOTE - NS ED NURSE REASSESS COMMENT FT1
pt recvd a total of 6 neb tx, per md's orders.  pre-neb peek flow=350.....post-neb peek flow=350.  will continue to monitor.

## 2019-12-05 NOTE — H&P ADULT - PROBLEM SELECTOR PLAN 1
RR 18 O2 sat 97% on RA, wheezing on PE, dry cough  Pulm, Dr. Elizabeth following  - IV solumedrol 40 mg q12  - Duonebs q6hrs   - Budesonide nebs BID  - Flonase BID, nasal saline  - Tessalon Perles q8hrs  - Acapella device  - monitor peak flow, incentive spirometer RR 18 O2 sat 97% on RA, wheezing on PE, dry cough  Pulm, Dr. 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 Elizabeth following  - IV solumedrol 40 mg q12  - Duonebs q6hrs   - Budesonide nebs BID  - Flonase BID, nasal saline  - Tessalon Perles q8hrs  - Acapella device  - monitor peak flow tid, incentive spirometer

## 2019-12-05 NOTE — H&P ADULT - NSHPPHYSICALEXAM_GEN_ALL_CORE
PHYSICAL EXAM:    GENERAL: NAD, well-groomed, well-developed, slightly anxious and shivering   HEAD:  Atraumatic, Normocephalic  EYES: EOMI, PERRLA, conjunctiva and sclera clear  ENT: No tonsillar erythema, exudates, or enlargement; Moist mucous membranes, Good dentition, No lesions  NECK: Supple, No JVD, Normal thyroid  CHEST/LUNG: wheezing on auscultation bilaterally; No rales, rhonchi,   HEART: Regular rate and rhythm; No murmurs, rubs, or gallops  ABDOMEN: Soft, Nontender, Nondistended; Bowel sounds present  EXTREMITIES:  2+ Peripheral Pulses, No clubbing, cyanosis, or edema  LYMPH: No lymphadenopathy noted  SKIN: No rashes or lesions  NERVOUS SYSTEM:  Alert & Oriented X3; Motor Strength 5/5 B/L upper and lower extremities; DTRs 2+ intact and symmetric

## 2019-12-05 NOTE — ED PROVIDER NOTE - PROGRESS NOTE DETAILS
d/w pt pulmonologist / Glenn, would like fellow to consult and pt to be admitted for failure of outpt therapy. d/w pulm fellow for consult will see pt. -Alessio Murphy PA-C

## 2019-12-05 NOTE — ED PROVIDER NOTE - OBJECTIVE STATEMENT
45 y/o F with PMHx of chronic severe asthma (no intubations, no prior ICU, multiple prior hospitalizations for asthma, states last exacerbation was 2 yrs ago, on ICS and PRN duonebs), nonsmoker, GERD, hypothyroidism presents c/o wheezing, sent by Dr. Elizabeth for admission. Pt notes she has had asthma exacerbation x 2 weeks initially on 20mg prednisone, attempted taper to 10mg with worsening sxs, increased to 40mg which she began on 12/3 without relief. Pt was seen in [facility] ED last week, given IV steroids, DC'ed home to f/u with pulm. Pt states she saw pulm NP on 11/29 was started on Doxy, nebulized budesonide, Duonebs, Spiriva, Qvar, Benzonatate, Nexium and continued on prednisone- pt notes had persistent sxs despite meds. Pt called her pulmonologist today and was referred to ED for admission. Pt notes sxs began 2 weeks ago when she developed URI sxs- dry nonproductive cough, sore throat, post nasal drip. Pt notes 2 episodes of post-tussive emesis over the weekend otherwise denies n/v. Pt denies f/c, diarrhea, urinary sxs, CP, hemoptysis, recent leg pain/swelling, personal or FHx of DVT/PE, recent surgery, long car/plane rides, hormone use. Pt did not have influenze vaccine this year otherwise UTD on vaccines.

## 2019-12-05 NOTE — H&P ADULT - ATTENDING COMMENTS
Seen, examined the patient with house staff, medical student around 5:30pm on 12/5/19. Older sister of the patient was present.

## 2019-12-05 NOTE — ED PROVIDER NOTE - PMH
Asthma  chronic severe, followed by Dr. Elizabeth, 3-4 episodes of asthma bronchitis over the winter treated with po steroids, denies any exacerbation since 3/2019 or intubation hx  H/O eosinophilia    H/O gastroesophageal reflux (GERD)    Hypothyroidism  resolved as per pt  Menorrhagia    Obesity    TEODORA on CPAP    Uterine fibroid

## 2019-12-05 NOTE — CONSULT NOTE ADULT - SUBJECTIVE AND OBJECTIVE BOX
[facility] - Division of Pulmonary, Critical Care and Sleep Medicine   Please call [phone] between 8am-pm weekdays, [phone] after hours and weekends      CHIEF COMPLAINT: Shortness of breath and wheezing x 2 weeks    HPI:    ED VS - hypertensive, not hypoxic  Has received Solumedrol 40 mg IV, Duoneb x1 and Albuterol neb.   CXR and RVP pending    PAST MEDICAL & SURGICAL HISTORY:  Menorrhagia  Uterine fibroid  Hypothyroidism: resolved as per pt  H/O eosinophilia  H/O gastroesophageal reflux (GERD)  Obesity  TEODORA on CPAP  Asthma: chronic severe, followed by Dr. Elizabeth, 3-4 episodes of asthma bronchitis over the winter treated with po steroids, denies any exacerbation since 3/2019 or intubation hx  Status post D&C: hysteroscopy  S/P LASIK surgery  S/P ovarian cystectomy: left       FAMILY HISTORY:  Family history of asthma  Family history of hypertension      SOCIAL HISTORY:  Smoking: [ ] Never Smoked [ ] Former Smoker (__ packs x ___ years) [ ] Current Smoker  (__ packs x ___ years)  Substance Use: [ ] Never Used [ ] Used ____  EtOH Use:  Marital Status: [ ] Single [ ]  [ ]  [ ]   Occupation:  Recent Travel:  Country of Birth:  Advance Directives:    Allergies    Mobic (Rash)  shellfish (Hives)    Intolerances        HOME MEDICATIONS:    REVIEW OF SYSTEMS:  Constitutional: [ ] fevers [ ] chills [ ] weight loss [ ] weight gain  HEENT: [ ] dry eyes [ ] eye irritation [ ] postnasal drip [ ] nasal congestion  CV: [ ] chest pain [ ] orthopnea [ ] palpitations [ ] murmur  Resp: [ ] cough [ ] shortness of breath [ ] wheezing [ ] sputum [ ] hemoptysis  GI: [ ] nausea [ ] vomiting [ ] diarrhea [ ] constipation [ ] abd pain [ ] dysphagia   : [ ] dysuria [ ] nocturia [ ] hematuria [ ] increased urinary frequency  Musculoskeletal: [ ] myalgias [ ] arthralgias   Skin: [ ] rash [ ] itch  Neurological: [ ] headache [ ] dizziness [ ] syncope [ ] weakness [ ] numbness  Psychiatric: [ ] anxiety [ ] depression  Endocrine: [ ] diabetes [ ] thyroid problem  Hematologic/Lymphatic: [ ] anemia [ ] bleeding problem  Allergic/Immunologic: [ ] itchy eyes [ ] nasal discharge [ ] hives [ ] angioedema  [ ] All other systems negative  [ ] Unable to assess ROS because ________    OBJECTIVE:  ICU Vital Signs Last 24 Hrs  T(C): 37.2 (05 Dec 2019 12:32), Max: 37.2 (05 Dec 2019 12:32)  T(F): 99 (05 Dec 2019 12:32), Max: 99 (05 Dec 2019 12:32)  HR: 97 (05 Dec 2019 12:32) (97 - 97)  BP: 163/98 (05 Dec 2019 12:32) (163/98 - 163/98)  BP(mean): --  ABP: --  ABP(mean): --  RR: 18 (05 Dec 2019 12:32) (18 - 18)  SpO2: 97% (05 Dec 2019 12:32) (97% - 97%)        CAPILLARY BLOOD GLUCOSE          PHYSICAL EXAM:  General:  NAD  HEENT:  NC/AT  Lymph Nodes: No cervical or supraclavicular lymphadenopathy   Neck: Supple  Respiratory:  CTA B/L, no wheezes, crackles or rhonchi  Cardiovascular:  RRR, no m/r/g  Abdomen: soft, NT/ND, +BS  Extremities: no clubbing, cyanosis or edema, warm  Skin: no rash  Neurological: AAOx3, no focal deficits  Psychiatry: not anxious, normal affect    HOSPITAL MEDICATIONS:  MEDICATIONS  (STANDING):  methylPREDNISolone sodium succinate Injectable 40 milliGRAM(s) IV Push Once  sodium chloride 0.9% Bolus 1000 milliLiter(s) IV Bolus once    MEDICATIONS  (PRN):      LABS:                        9.0    10.38 )-----------( 576      ( 05 Dec 2019 14:37 )             31.1     Hgb Trend: 9.0<--  12-05    142  |  101  |  19  ----------------------------<  123<H>  3.9   |  24  |  0.74    Ca    9.5      05 Dec 2019 14:08    TPro  8.1  /  Alb  4.5  /  TBili  0.2  /  DBili  x   /  AST  29  /  ALT  23  /  AlkPhos  65  12-05    Creatinine Trend: 0.74<--    Urinalysis Basic - ( 05 Dec 2019 14:08 )    Color: Light Yellow / Appearance: Clear / S.029 / pH: x  Gluc: x / Ketone: Negative  / Bili: Negative / Urobili: Negative   Blood: x / Protein: Trace / Nitrite: Negative   Leuk Esterase: Negative / RBC: 10 /hpf / WBC 1 /HPF   Sq Epi: x / Non Sq Epi: 3 /hpf / Bacteria: Few        Venous Blood Gas:  12-05 @ 14:08  7.37/47/38/27/65  VBG Lactate: 2.3      MICROBIOLOGY:     RADIOLOGY:   [ ] Reviewed and interpreted by me    PULMONARY FUNCTION TESTS:    EKG: [facility] - Division of Pulmonary, Critical Care and Sleep Medicine   Please call [phone] between 8am-pm weekdays, [phone] after hours and weekends      CHIEF COMPLAINT: Shortness of breath and wheezing x 2 weeks    HPI:  43 yo F lifelong nonsmoker, with a h/o moderate persistent asthma. GERD, obesity presents with 2 weeks of persistent wheezing and nonproductive cough. Developed URI symptoms of nasal congestion, nonproductive cough, progressed to wheezing. Seen in Dr. Elizabeth's office, started on Prednisone 20 mg , attempted taper to 10mg with worsening symptoms.  Pt was seen in [facility] ED last week, given IV steroids and was sent home to follow up with pulm.  Pt states on  was started on Doxy, nebulized budesonide, Duonebs, Spiriva, Qvar, Benzonatate, Nexium and continued on prednisone- pt notes had persistent sxs despite meds. Increased back to 40 mg 2 days ago and started on which she began on 12/3 without relief.  Pt called Dr. Elizabeth's office today and was referred to ED for admission. Pt notes sxs began 2 weeks ago when she developed URI sxs- dry nonproductive cough, sore throat, post nasal drip. Pt notes 2 episodes of post-tussive emesis over the weekend otherwise denies n/v.   ED VS - hypertensive, not hypoxic  Has received Solumedrol 40 mg IV, Duoneb x1 and Albuterol neb. RVP pending    PAST MEDICAL & SURGICAL HISTORY:  Menorrhagia  Uterine fibroid  Hypothyroidism: resolved as per pt  H/O eosinophilia  H/O gastroesophageal reflux (GERD)  Obesity  TEODORA on CPAP  Asthma: chronic severe, followed by Dr. Elizabeth, 3-4 episodes of asthma bronchitis over the winter treated with po steroids, denies any exacerbation since 3/2019 or intubation hx  Status post D&C: hysteroscopy  S/P LASIK surgery  S/P ovarian cystectomy: left       FAMILY HISTORY:  Family history of asthma  Family history of hypertension      SOCIAL HISTORY:  Smoking: [x ] Never Smoked [ ] Former Smoker (__ packs x ___ years) [ ] Current Smoker  (__ packs x ___ years)  Substance Use: [ x] Never Used [ ] Used ____  EtOH Use: denies    Allergies  Mobic (Rash)  shellfish (Hives)    HOME MEDICATIONS: reviewed - Symbicort, Duonebs    REVIEW OF SYSTEMS:  Constitutional: [ ] fevers [ ] chills [ ] weight loss [ ] weight gain  HEENT: [ ] dry eyes [ ] eye irritation [x ] postnasal drip [x ] nasal congestion  CV: [ ] chest pain [ ] orthopnea [ ] palpitations [ ] murmur  Resp: [x ] cough [x ] shortness of breath [x ] wheezing [ ] sputum [ ] hemoptysis  GI: [ ] nausea [ ] vomiting [ ] diarrhea [ ] constipation [ ] abd pain [ ] dysphagia   : [ ] dysuria [ ] nocturia [ ] hematuria [ ] increased urinary frequency  Musculoskeletal: [ ] myalgias [ ] arthralgias   Skin: [ ] rash [ ] itch  Neurological: [ ] headache [ ] dizziness [ ] syncope [ ] weakness [ ] numbness  Psychiatric: [ ] anxiety [ ] depression  Endocrine: [ ] diabetes [ ] thyroid problem  Hematologic/Lymphatic: [ ] anemia [ ] bleeding problem  Allergic/Immunologic: [ ] itchy eyes [ ] nasal discharge [ ] hives [ ] angioedema  [x ] All other systems negative  [ ] Unable to assess ROS because ________    OBJECTIVE:  ICU Vital Signs Last 24 Hrs  T(C): 37.2 (05 Dec 2019 12:32), Max: 37.2 (05 Dec 2019 12:32)  T(F): 99 (05 Dec 2019 12:32), Max: 99 (05 Dec 2019 12:32)  HR: 97 (05 Dec 2019 12:32) (97 - 97)  BP: 163/98 (05 Dec 2019 12:32) (163/98 - 163/98)  BP(mean): --  ABP: --  ABP(mean): --  RR: 18 (05 Dec 2019 12:32) (18 - 18)  SpO2: 97% (05 Dec 2019 12:32) (97% - 97%)        CAPILLARY BLOOD GLUCOSE          PHYSICAL EXAM:  General:  moderate distress, frequent coughing during the exam   HEENT:  NC/AT  Neck: Supple  Respiratory:  movign air throughout, +end expiratory wheezing. no crackles or rhonchi  Cardiovascular:  RRR, no m/r/g  Abdomen: soft, NT/ND, +BS  Extremities: no clubbing, cyanosis or edema, warm  Skin: no rash  Neurological: AAOx3, no focal deficits  Psychiatry: not anxious, normal affect    HOSPITAL MEDICATIONS:  MEDICATIONS  (STANDING):  methylPREDNISolone sodium succinate Injectable 40 milliGRAM(s) IV Push Once  sodium chloride 0.9% Bolus 1000 milliLiter(s) IV Bolus once    MEDICATIONS  (PRN):      LABS:                        9.0    10.38 )-----------( 576      ( 05 Dec 2019 14:37 )             31.1     Hgb Trend: 9.0<--      142  |  101  |  19  ----------------------------<  123<H>  3.9   |  24  |  0.74    Ca    9.5      05 Dec 2019 14:08    TPro  8.1  /  Alb  4.5  /  TBili  0.2  /  DBili  x   /  AST  29  /  ALT  23  /  AlkPhos  65      Creatinine Trend: 0.74<--    Urinalysis Basic - ( 05 Dec 2019 14:08 )    Color: Light Yellow / Appearance: Clear / S.029 / pH: x  Gluc: x / Ketone: Negative  / Bili: Negative / Urobili: Negative   Blood: x / Protein: Trace / Nitrite: Negative   Leuk Esterase: Negative / RBC: 10 /hpf / WBC 1 /HPF   Sq Epi: x / Non Sq Epi: 3 /hpf / Bacteria: Few        Venous Blood Gas:   @ 14:08  7.37/47/38/27/65  VBG Lactate: 2.3      MICROBIOLOGY:  RVP pending    RADIOLOGY:   [x ] Reviewed and interpreted by me  < from: Xray Chest 2 Views PA/Lat (19 @ 15:21) >  EXAM:  XR CHEST PA LAT 2V                            PROCEDURE DATE:  2019      INTERPRETATION:  DATE OF STUDY: 2019    COMPARISON: 16.    CLINICAL INDICATION: Shortness of breath; cough. Assess for chest process.    TECHNIQUE: PA and lateral chest films.     FINDINGS:   The cardiomediastinal silhouette is unremarkable.   The lungs are clear. No pleural effusion or pneumothorax.   Bony structures are intact.     IMPRESSION:    Stable chest.  Clear lungs.     CONSTANZA OROZCO M.D., ATTENDING RADIOLOGIST  This document has been electronically signed. Dec  5 2019  3:35PM        < end of copied text >

## 2019-12-05 NOTE — H&P ADULT - HISTORY OF PRESENT ILLNESS
43 yo F with PMH chronic severe asthma (no intubation, no prior ICU, last exacerbation was 2 years ago), iron deficiency anemia, TEODORA on CPAP presents with wheezing and shortness of breath. 2 weeks ago patient began having URI symptoms with runny nose, dry cough, and congestion which led to feeling of chest tightness and wheezing. Normally patient takes symbicort as maintenance for her asthma and once the cold symptoms began she required home duonebs 2x day to help with her breathing. Patient still had shortness of breath and called her pulmonologist, Dr. Rodrigue Elizabeth who prescribed 20 mg prednisone taper. Patient took 20 mg prednisone x7 days and felt better then took 10 mg prednisone x5 days. During this time patient traveled to [place] for [facility] and URI symptoms began to worsen. This past Monday, 12/2, SOB worsened and patient went to [facility] ER where she got duonebs x3 and was discharged home. The next day, 12/3 patient went to Dr. Elizabeth's outpatient office and was prescribed doxycycline, prednisone 40 mg, performist, qvar, singulair, spiriva, and given IV solumedrol. Patient continued to wheeze and her dry cough got worse and this morning at work patient could not speak in full sentences due to difficulty breathing and came to ER. Patient denies fevers, known sick contacts, N/V/D.     In ED vital: Temp 99, /98, HR 97, RR 18, O2 Sat 97%  Given 1L NS bolus x1 dose, IV solumedrol 40 mg x1 dose   CXR: clear lungs

## 2019-12-05 NOTE — H&P ADULT - NSHPREVIEWOFSYSTEMS_GEN_ALL_CORE
REVIEW OF SYSTEMS:    CONSTITUTIONAL: No weakness, fevers or chills  EYES: No visual changes; No blurry vision  ENT:  No pain or stiffness; No vertigo or throat pain, positive nasal congestion,   RESPIRATORY: positive dry cough, positive wheezing, no hemoptysis; positive shortness of breath  CARDIOVASCULAR: No chest pain or palpitations  GASTROINTESTINAL: No abdominal or epigastric pain. No nausea, vomiting, or hematemesis; No diarrhea or constipation. No melena or hematochezia.  GENITOURINARY: No dysuria, frequency or hematuria  NEUROLOGICAL: No numbness, No HA  SKIN: No itching, rashes  MSK: no joint pain, no muscle pain

## 2019-12-05 NOTE — H&P ADULT - ASSESSMENT
44F PMH of 44F PMH of chronic severe asthma (no intubation, no prior ICU, last exacerbation was 2 years ago), iron deficiency anemia, TEODORA on CPAP presents with wheezing and shortness of breath, likely asthma exacerbation 2/2 URI. 44F PMH of chronic severe asthma (no intubation, no prior ICU, last exacerbation was 2 years ago), iron deficiency anemia, TEODORA on CPAP presents with wheezing and shortness of breath, likely asthma exacerbation 2/2 URI. RVP pos for parainfluenza.

## 2019-12-05 NOTE — ED PROVIDER NOTE - CLINICAL SUMMARY MEDICAL DECISION MAKING FREE TEXT BOX
Tin: 44 year old female with severe asthma sent in for admission for acute asthma exacerbation that has been going on for past 2 weeks. been on steroids oral and inhaled, + cough. no fever, no chills. will give albuterol, steroids, consult pulm , admit.

## 2019-12-05 NOTE — CONSULT NOTE ADULT - ASSESSMENT
43 yo F lifelong nonsmoker, with a h/o moderate persistent asthma. GERD, obesity presents with 2 weeks of persistent wheezing and nonproductive cough. Developed URI symptoms of nasal congestion, nonproductive cough, progressed to wheezing. Seen in Dr. Elizabeth's office, started on Prednisone 20 mg , attempted taper to 10mg with worsening symptoms.  Pt was seen in [facility] ED last week, given IV steroids and was sent home to follow up with pulm.  Pt states on 11/29 was started on Doxy, nebulized budesonide, Duonebs, Spiriva, Qvar, Benzonatate, Nexium and continued on prednisone- pt notes had persistent sxs despite meds. Increased back to 40 mg 2 days ago and started on which she began on 12/3 without relief.  Pt called Dr. Elizabeth's office today and was referred to ED for admission. Pt notes sxs began 2 weeks ago when she developed URI sxs- dry nonproductive cough, sore throat, post nasal drip. Pt notes 2 episodes of post-tussive emesis over the weekend otherwise denies n/v.   ED VS - hypertensive, not hypoxic  Has received Solumedrol 40 mg IV, Duoneb x1 and Albuterol neb. 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 Elizabeth to follow up.

## 2019-12-05 NOTE — H&P ADULT - NSHPSOCIALHISTORY_GEN_ALL_CORE
Patient lives alone in [place]. 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 Denies smoking or recreational drug use. Occasional alcohol use.

## 2019-12-05 NOTE — CONSULT NOTE ADULT - CONSULT REASON
Patient Seen in: [address] [facility]    History   Patient presents with:  Cough/URI    Stated Complaint: Cough and ST    HPI  7year-old female who presents to the [address] [place] [address] with her father with complaints of cough, sore throat and left ear pain fo complaint: Cough and ST  Other systems are as noted in HPI. Constitutional and vital signs reviewed. All other systems reviewed and negative except as noted above. PSFH elements reviewed from today and agreed except as otherwise stated in HPI. Asthma exacerbation    Pulmonary consultation on behalf of Dr. Rodrigue Elizabeth Disposition and Plan     Clinical Impression:  Acute suppurative otitis media of left ear without spontaneous rupture of tympanic membrane, recurrence not specified  (primary encounter diagnosis)    Disposition:  Discharge    Follow-up:  Tio Jackson    In 1 we

## 2019-12-06 DIAGNOSIS — R06.02 SHORTNESS OF BREATH: ICD-10-CM

## 2019-12-06 DIAGNOSIS — Z29.9 ENCOUNTER FOR PROPHYLACTIC MEASURES, UNSPECIFIED: ICD-10-CM

## 2019-12-06 DIAGNOSIS — E66.9 OBESITY, UNSPECIFIED: ICD-10-CM

## 2019-12-06 DIAGNOSIS — J30.9 ALLERGIC RHINITIS, UNSPECIFIED: ICD-10-CM

## 2019-12-06 DIAGNOSIS — G47.33 OBSTRUCTIVE SLEEP APNEA (ADULT) (PEDIATRIC): ICD-10-CM

## 2019-12-06 DIAGNOSIS — J45.901 UNSPECIFIED ASTHMA WITH (ACUTE) EXACERBATION: ICD-10-CM

## 2019-12-06 DIAGNOSIS — B34.8 OTHER VIRAL INFECTIONS OF UNSPECIFIED SITE: ICD-10-CM

## 2019-12-06 DIAGNOSIS — K21.9 GASTRO-ESOPHAGEAL REFLUX DISEASE WITHOUT ESOPHAGITIS: ICD-10-CM

## 2019-12-06 LAB
ALBUMIN SERPL ELPH-MCNC: 4.6 G/DL — SIGNIFICANT CHANGE UP (ref 3.3–5)
ALP SERPL-CCNC: 55 U/L — SIGNIFICANT CHANGE UP (ref 40–120)
ALT FLD-CCNC: 18 U/L — SIGNIFICANT CHANGE UP (ref 10–45)
ANION GAP SERPL CALC-SCNC: 14 MMOL/L — SIGNIFICANT CHANGE UP (ref 5–17)
AST SERPL-CCNC: 17 U/L — SIGNIFICANT CHANGE UP (ref 10–40)
BILIRUB SERPL-MCNC: 0.2 MG/DL — SIGNIFICANT CHANGE UP (ref 0.2–1.2)
BLD GP AB SCN SERPL QL: NEGATIVE — SIGNIFICANT CHANGE UP
BUN SERPL-MCNC: 15 MG/DL — SIGNIFICANT CHANGE UP (ref 7–23)
CALCIUM SERPL-MCNC: 9.1 MG/DL — SIGNIFICANT CHANGE UP (ref 8.4–10.5)
CHLORIDE SERPL-SCNC: 101 MMOL/L — SIGNIFICANT CHANGE UP (ref 96–108)
CO2 SERPL-SCNC: 23 MMOL/L — SIGNIFICANT CHANGE UP (ref 22–31)
CREAT SERPL-MCNC: 0.63 MG/DL — SIGNIFICANT CHANGE UP (ref 0.5–1.3)
GLUCOSE SERPL-MCNC: 127 MG/DL — HIGH (ref 70–99)
HCT VFR BLD CALC: 27.6 % — LOW (ref 34.5–45)
HGB BLD-MCNC: 8.1 G/DL — LOW (ref 11.5–15.5)
MCHC RBC-ENTMCNC: 22.1 PG — LOW (ref 27–34)
MCHC RBC-ENTMCNC: 29.3 GM/DL — LOW (ref 32–36)
MCV RBC AUTO: 75.2 FL — LOW (ref 80–100)
PLATELET # BLD AUTO: 501 K/UL — HIGH (ref 150–400)
POTASSIUM SERPL-MCNC: 3.7 MMOL/L — SIGNIFICANT CHANGE UP (ref 3.5–5.3)
POTASSIUM SERPL-SCNC: 3.7 MMOL/L — SIGNIFICANT CHANGE UP (ref 3.5–5.3)
PROT SERPL-MCNC: 7.1 G/DL — SIGNIFICANT CHANGE UP (ref 6–8.3)
RBC # BLD: 3.67 M/UL — LOW (ref 3.8–5.2)
RBC # FLD: 18.5 % — HIGH (ref 10.3–14.5)
RH IG SCN BLD-IMP: POSITIVE — SIGNIFICANT CHANGE UP
SODIUM SERPL-SCNC: 138 MMOL/L — SIGNIFICANT CHANGE UP (ref 135–145)
WBC # BLD: 13.84 K/UL — HIGH (ref 3.8–10.5)
WBC # FLD AUTO: 13.84 K/UL — HIGH (ref 3.8–10.5)

## 2019-12-06 PROCEDURE — 99233 SBSQ HOSP IP/OBS HIGH 50: CPT | Mod: GC

## 2019-12-06 PROCEDURE — 99232 SBSQ HOSP IP/OBS MODERATE 35: CPT

## 2019-12-06 RX ORDER — IPRATROPIUM/ALBUTEROL SULFATE 18-103MCG
3 AEROSOL WITH ADAPTER (GRAM) INHALATION ONCE
Refills: 0 | Status: COMPLETED | OUTPATIENT
Start: 2019-12-06 | End: 2019-12-06

## 2019-12-06 RX ORDER — IBUPROFEN 200 MG
600 TABLET ORAL EVERY 8 HOURS
Refills: 0 | Status: DISCONTINUED | OUTPATIENT
Start: 2019-12-06 | End: 2019-12-08

## 2019-12-06 RX ORDER — IBUPROFEN 200 MG
200 TABLET ORAL ONCE
Refills: 0 | Status: COMPLETED | OUTPATIENT
Start: 2019-12-06 | End: 2019-12-06

## 2019-12-06 RX ADMIN — Medication 100 MILLIGRAM(S): at 18:31

## 2019-12-06 RX ADMIN — Medication 600 MILLIGRAM(S): at 14:00

## 2019-12-06 RX ADMIN — Medication 100 MILLIGRAM(S): at 05:08

## 2019-12-06 RX ADMIN — Medication 3 MILLILITER(S): at 11:59

## 2019-12-06 RX ADMIN — SODIUM CHLORIDE 5 MILLILITER(S): 9 INJECTION INTRAMUSCULAR; INTRAVENOUS; SUBCUTANEOUS at 05:09

## 2019-12-06 RX ADMIN — Medication 1 SPRAY(S): at 18:30

## 2019-12-06 RX ADMIN — Medication 600 MILLIGRAM(S): at 21:04

## 2019-12-06 RX ADMIN — BUDESONIDE AND FORMOTEROL FUMARATE DIHYDRATE 2 PUFF(S): 160; 4.5 AEROSOL RESPIRATORY (INHALATION) at 05:09

## 2019-12-06 RX ADMIN — Medication 100 MILLIGRAM(S): at 21:06

## 2019-12-06 RX ADMIN — MONTELUKAST 10 MILLIGRAM(S): 4 TABLET, CHEWABLE ORAL at 11:59

## 2019-12-06 RX ADMIN — Medication 1 SPRAY(S): at 05:08

## 2019-12-06 RX ADMIN — BUDESONIDE AND FORMOTEROL FUMARATE DIHYDRATE 2 PUFF(S): 160; 4.5 AEROSOL RESPIRATORY (INHALATION) at 18:29

## 2019-12-06 RX ADMIN — SODIUM CHLORIDE 5 MILLILITER(S): 9 INJECTION INTRAMUSCULAR; INTRAVENOUS; SUBCUTANEOUS at 18:30

## 2019-12-06 RX ADMIN — Medication 100 MILLIGRAM(S): at 05:09

## 2019-12-06 RX ADMIN — Medication 3 MILLILITER(S): at 05:07

## 2019-12-06 RX ADMIN — PANTOPRAZOLE SODIUM 40 MILLIGRAM(S): 20 TABLET, DELAYED RELEASE ORAL at 05:08

## 2019-12-06 RX ADMIN — Medication 100 MILLIGRAM(S): at 13:04

## 2019-12-06 RX ADMIN — Medication 600 MILLIGRAM(S): at 22:00

## 2019-12-06 RX ADMIN — Medication 40 MILLIGRAM(S): at 05:19

## 2019-12-06 RX ADMIN — Medication 3 MILLILITER(S): at 03:01

## 2019-12-06 RX ADMIN — Medication 200 MILLIGRAM(S): at 01:50

## 2019-12-06 RX ADMIN — Medication 200 MILLIGRAM(S): at 00:50

## 2019-12-06 RX ADMIN — Medication 600 MILLIGRAM(S): at 13:03

## 2019-12-06 RX ADMIN — Medication 3 MILLILITER(S): at 00:21

## 2019-12-06 RX ADMIN — Medication 3 MILLILITER(S): at 18:28

## 2019-12-06 RX ADMIN — Medication 40 MILLIGRAM(S): at 18:28

## 2019-12-06 NOTE — PROGRESS NOTE ADULT - PROBLEM SELECTOR PLAN 3
Hb 9, baseline 9-10  Iron studies  No syncope, acitve bleeding, indication for transfusion at this time   will maintain active Type and screen

## 2019-12-06 NOTE — PROGRESS NOTE ADULT - ASSESSMENT
44F PMH of chronic severe asthma (no intubation, no prior ICU, last exacerbation was 2 years ago), iron deficiency anemia, TEODORA on CPAP presents with wheezing and shortness of breath, likely asthma exacerbation 2/2 URI. RVP pos for parainfluenza.

## 2019-12-06 NOTE — PROGRESS NOTE ADULT - PROBLEM SELECTOR PLAN 1
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 Glenn Graham following  - IV solumedrol 40 mg q12  - Duonebs q6hrs   - Budesonide nebs BID  - Flonase BID, nasal saline  - Tessalon Perles q8hrs  - Acapella device  - monitor peak flow tid, incentive spirometer

## 2019-12-06 NOTE — PROGRESS NOTE ADULT - SUBJECTIVE AND OBJECTIVE BOX
Chief Complaint: Patient is a 44y old  Female who presents with a chief complaint of Asthma Exacerbation (05 Dec 2019 16:37)    INTERVAL HPI/OVERNIGHT EVENTS: Overnight patient had persistent dry cough and continued wheezing for which she required an additional duoneb treatment besides the regular q6hrs and was given hycodan for her cough. Patient also had a headache for which she took advil and it improved. Patient denies fever, chest pain, N/V/D.     REVIEW OF SYSTEMS:   CONSTITUTIONAL:  Denies fever/chills, fatigue, weight loss.  Eyes:  Denies visual loss, blurred vision, double vision or scleral icterus.  Ears, Nose, Throat:  Denies hearing loss, sneezing, congestion, runny nose or sore throat.  SKIN:  Denies rash or itching.  CARDIOVASCULAR:  Denies chest pain, ELKE  RESPIRATORY: positive shortness of breath, positive dry cough   GASTROINTESTINAL:  Denies anorexia, nausea, vomiting or diarrhea. No abdominal pain or blood.  GENITOURINARY:  Denies any hematuria, dysuria  NEUROLOGICAL:  Denies headache, dizziness, syncope, paralysis, ataxia, numbness or tingling in the extremities. No change in bowel or bladder control.  MUSCULOSKELETAL:  Denies muscle, back pain, joint pain or stiffness.  HEMATOLOGIC:  Denies anemia, bleeding or bruising.  LYMPHATICS:  Denies enlarged nodes.   PSYCHIATRIC:  Denies symptoms of depression or anxiety.  ENDOCRINOLOGIC:  Denies reports of sweating, cold or heat intolerance. No polyuria or polydipsia.      MEDICATIONS  (STANDING):  albuterol/ipratropium for Nebulization 3 milliLiter(s) Nebulizer every 6 hours  benzonatate 100 milliGRAM(s) Oral every 8 hours  budesonide  80 MICROgram(s)/formoterol 4.5 MICROgram(s) Inhaler 2 Puff(s) Inhalation two times a day  doxycycline hyclate Capsule 100 milliGRAM(s) Oral every 12 hours  enoxaparin Injectable 40 milliGRAM(s) SubCutaneous daily  fluticasone propionate 50 MICROgram(s)/spray Nasal Spray 1 Spray(s) Both Nostrils two times a day  influenza   Vaccine 0.5 milliLiter(s) IntraMuscular once  methylPREDNISolone sodium succinate Injectable 40 milliGRAM(s) IV Push two times a day  montelukast 10 milliGRAM(s) Oral daily  pantoprazole    Tablet 40 milliGRAM(s) Oral before breakfast  sodium chloride 3%  Inhalation 5 milliLiter(s) Inhalation two times a day    MEDICATIONS  (PRN):      Vital Signs Last 24 Hrs  T(C): 36.9 (06 Dec 2019 00:54), Max: 37.2 (05 Dec 2019 12:32)  T(F): 98.4 (06 Dec 2019 00:54), Max: 99 (05 Dec 2019 12:32)  HR: 85 (06 Dec 2019 06:42) (79 - 97)  BP: 119/70 (06 Dec 2019 00:54) (118/76 - 163/98)  BP(mean): --  RR: 18 (06 Dec 2019 00:54) (16 - 18)  SpO2: 99% (06 Dec 2019 06:42) (95% - 99%)  Supplemental O2: [ ] No, on Room Air [ ] Yes,     I&O's Detail    CAPILLARY BLOOD GLUCOSE          PHYSICAL EXAM:    GENERAL: NAD  HEAD:  NC/AT  EYES: EOMI, white sclerae  ENMT: MMM, no oropharyngeal lesions or erythema appreciated, dentition well appearing  Neck: trachea midline, no appreciable masses  Pulm: increased work of breathing, expiratory wheezing b/l  CV: S1&S2+, RRR, no murmurs, rubs or gallops  ABDOMEN: soft, nontender, nondistended  : no cva tenderness, no marin placed  EXTREMITIES:  no appreciable edema in b/l LE  Neuro: A&Ox3, no focal deficits  SKIN: warm and dry, no visible rash    LABS:                        9.0    10.38 )-----------( 576      ( 05 Dec 2019 14:37 )             31.1     12-    138  |  101  |  15  ----------------------------<  127<H>  3.7   |  23  |  0.63    Ca    9.1      06 Dec 2019 07:14    TPro  7.1  /  Alb  4.6  /  TBili  0.2  /  DBili  x   /  AST  17  /  ALT  18  /  AlkPhos  55  12-06    LIVER FUNCTIONS - ( 06 Dec 2019 07:14 )  Alb: 4.6 g/dL / Pro: 7.1 g/dL / ALK PHOS: 55 U/L / ALT: 18 U/L / AST: 17 U/L / GGT: x     / T. Bili 0.2 mg/dL / D. Bili x           Urinalysis Basic - ( 05 Dec 2019 14:08 )    Color: Light Yellow / Appearance: Clear / S.029 / pH: x  Gluc: x / Ketone: Negative  / Bili: Negative / Urobili: Negative   Blood: x / Protein: Trace / Nitrite: Negative   Leuk Esterase: Negative / RBC: 10 /hpf / WBC 1 /HPF   Sq Epi: x / Non Sq Epi: 3 /hpf / Bacteria: Few              Microbiology:    RADIOLOGY & ADDITIONAL TESTS:    CXR 2019: clear lungs

## 2019-12-07 DIAGNOSIS — Z02.9 ENCOUNTER FOR ADMINISTRATIVE EXAMINATIONS, UNSPECIFIED: ICD-10-CM

## 2019-12-07 LAB
ALBUMIN SERPL ELPH-MCNC: 4.2 G/DL — SIGNIFICANT CHANGE UP (ref 3.3–5)
ALP SERPL-CCNC: 60 U/L — SIGNIFICANT CHANGE UP (ref 40–120)
ALT FLD-CCNC: 22 U/L — SIGNIFICANT CHANGE UP (ref 10–45)
ANION GAP SERPL CALC-SCNC: 15 MMOL/L — SIGNIFICANT CHANGE UP (ref 5–17)
AST SERPL-CCNC: 18 U/L — SIGNIFICANT CHANGE UP (ref 10–40)
BILIRUB SERPL-MCNC: 0.2 MG/DL — SIGNIFICANT CHANGE UP (ref 0.2–1.2)
BUN SERPL-MCNC: 18 MG/DL — SIGNIFICANT CHANGE UP (ref 7–23)
CALCIUM SERPL-MCNC: 8.5 MG/DL — SIGNIFICANT CHANGE UP (ref 8.4–10.5)
CHLORIDE SERPL-SCNC: 99 MMOL/L — SIGNIFICANT CHANGE UP (ref 96–108)
CO2 SERPL-SCNC: 23 MMOL/L — SIGNIFICANT CHANGE UP (ref 22–31)
CREAT SERPL-MCNC: 0.52 MG/DL — SIGNIFICANT CHANGE UP (ref 0.5–1.3)
GLUCOSE SERPL-MCNC: 138 MG/DL — HIGH (ref 70–99)
HCT VFR BLD CALC: 30.8 % — LOW (ref 34.5–45)
HGB BLD-MCNC: 8.9 G/DL — LOW (ref 11.5–15.5)
MCHC RBC-ENTMCNC: 22.1 PG — LOW (ref 27–34)
MCHC RBC-ENTMCNC: 28.9 GM/DL — LOW (ref 32–36)
MCV RBC AUTO: 76.6 FL — LOW (ref 80–100)
PLATELET # BLD AUTO: 504 K/UL — HIGH (ref 150–400)
POTASSIUM SERPL-MCNC: 4.2 MMOL/L — SIGNIFICANT CHANGE UP (ref 3.5–5.3)
POTASSIUM SERPL-SCNC: 4.2 MMOL/L — SIGNIFICANT CHANGE UP (ref 3.5–5.3)
PROT SERPL-MCNC: 7.2 G/DL — SIGNIFICANT CHANGE UP (ref 6–8.3)
RBC # BLD: 4.02 M/UL — SIGNIFICANT CHANGE UP (ref 3.8–5.2)
RBC # FLD: 18.4 % — HIGH (ref 10.3–14.5)
SODIUM SERPL-SCNC: 137 MMOL/L — SIGNIFICANT CHANGE UP (ref 135–145)
WBC # BLD: 12.49 K/UL — HIGH (ref 3.8–10.5)
WBC # FLD AUTO: 12.49 K/UL — HIGH (ref 3.8–10.5)

## 2019-12-07 PROCEDURE — 99233 SBSQ HOSP IP/OBS HIGH 50: CPT

## 2019-12-07 PROCEDURE — 99232 SBSQ HOSP IP/OBS MODERATE 35: CPT

## 2019-12-07 RX ORDER — BUDESONIDE, MICRONIZED 100 %
0.5 POWDER (GRAM) MISCELLANEOUS
Refills: 0 | Status: DISCONTINUED | OUTPATIENT
Start: 2019-12-07 | End: 2019-12-10

## 2019-12-07 RX ADMIN — Medication 100 MILLIGRAM(S): at 21:30

## 2019-12-07 RX ADMIN — PANTOPRAZOLE SODIUM 40 MILLIGRAM(S): 20 TABLET, DELAYED RELEASE ORAL at 05:57

## 2019-12-07 RX ADMIN — Medication 600 MILLIGRAM(S): at 05:56

## 2019-12-07 RX ADMIN — Medication 100 MILLIGRAM(S): at 05:57

## 2019-12-07 RX ADMIN — Medication 100 MILLIGRAM(S): at 17:23

## 2019-12-07 RX ADMIN — Medication 40 MILLIGRAM(S): at 17:23

## 2019-12-07 RX ADMIN — Medication 3 MILLILITER(S): at 17:21

## 2019-12-07 RX ADMIN — Medication 600 MILLIGRAM(S): at 21:35

## 2019-12-07 RX ADMIN — Medication 1 SPRAY(S): at 17:23

## 2019-12-07 RX ADMIN — Medication 3 MILLILITER(S): at 00:19

## 2019-12-07 RX ADMIN — Medication 100 MILLIGRAM(S): at 12:31

## 2019-12-07 RX ADMIN — Medication 3 MILLILITER(S): at 12:29

## 2019-12-07 RX ADMIN — Medication 1 SPRAY(S): at 05:58

## 2019-12-07 RX ADMIN — Medication 3 MILLILITER(S): at 05:57

## 2019-12-07 RX ADMIN — BUDESONIDE AND FORMOTEROL FUMARATE DIHYDRATE 2 PUFF(S): 160; 4.5 AEROSOL RESPIRATORY (INHALATION) at 05:58

## 2019-12-07 RX ADMIN — Medication 600 MILLIGRAM(S): at 15:35

## 2019-12-07 RX ADMIN — Medication 40 MILLIGRAM(S): at 05:57

## 2019-12-07 RX ADMIN — Medication 100 MILLIGRAM(S): at 06:00

## 2019-12-07 RX ADMIN — SODIUM CHLORIDE 5 MILLILITER(S): 9 INJECTION INTRAMUSCULAR; INTRAVENOUS; SUBCUTANEOUS at 05:59

## 2019-12-07 RX ADMIN — Medication 0.5 MILLIGRAM(S): at 21:30

## 2019-12-07 RX ADMIN — SODIUM CHLORIDE 5 MILLILITER(S): 9 INJECTION INTRAMUSCULAR; INTRAVENOUS; SUBCUTANEOUS at 17:22

## 2019-12-07 RX ADMIN — Medication 600 MILLIGRAM(S): at 14:49

## 2019-12-07 RX ADMIN — MONTELUKAST 10 MILLIGRAM(S): 4 TABLET, CHEWABLE ORAL at 12:30

## 2019-12-07 NOTE — PROGRESS NOTE ADULT - ASSESSMENT
43 yo F lifelong nonsmoker, with a h/o moderate persistent asthma. GERD, obesity presents with 2 weeks of persistent wheezing and nonproductive cough. Developed URI symptoms of nasal congestion, nonproductive cough, progressed to wheezing. Seen in Dr. Elizabeth's office, started on Prednisone 20 mg , attempted taper to 10mg with worsening symptoms.  Pt was seen in [facility] ED last week, given IV steroids and was sent home to follow up with pulm.  Pt states on 11/29 was started on Doxy, nebulized budesonide, Duonebs, Spiriva, Qvar, Benzonatate, Nexium and continued on prednisone- pt notes had persistent sxs despite meds. Increased back to 40 mg 2 days ago and started on which she began on 12/3 without relief.  Pt notes sxs began 2 weeks ago when she developed URI sxs- dry nonproductive cough, sore throat, post nasal drip. Pt notes 2 episodes of post-tussive emesis over the weekend otherwise denies n/v.   ED VS - hypertensive, not hypoxic  Has received Solumedrol 40 mg IV, Duoneb x1 and Albuterol neb. 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 Not hypoxic  Doubt superimposed pneumonia - would discontinue Doxycycline  c/w IV steroids- Solumedrol 40 mg Q 12hours- will likely change to Prednisone 40 mg tomorrow  Bronchodilators - Duonebs Q6 hours standing, would change Symbicort MDI to Budesonide nebs bid (I have ordered)  Flonase BID and nasal saline   Tessalon perles Q8 hours, continues to complain of significant nocturnal symptoms - would start Hycodan QHS (I have ordered 1 dose)  Acapella device Q 4hours to aid in airway clearance, incentive spirometer, monitor peak flows 3x a day  GERD- H2 blocker  DVT ppx    Will follow up

## 2019-12-07 NOTE — PROGRESS NOTE ADULT - PROBLEM SELECTOR PLAN 1
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 Glenn Graham following  - IV solumedrol 40 mg q12  - Duonebs q6hrs   - Budesonide nebs BID  - Flonase BID, nasal saline  - Tessalon Perles q8hrs  - Acapella device  - monitor peak flow tid, incentive spirometer

## 2019-12-07 NOTE — PROGRESS NOTE ADULT - SUBJECTIVE AND OBJECTIVE BOX
MARISA KWAN  44y  Female    Complaints:  Subjective:         Vital Signs Last 24 Hrs  T(C): 36.9 (07 Dec 2019 00:02), Max: 36.9 (07 Dec 2019 00:02)  T(F): 98.4 (07 Dec 2019 00:02), Max: 98.4 (07 Dec 2019 00:02)  HR: 80 (07 Dec 2019 04:52) (79 - 89)  BP: 111/68 (07 Dec 2019 00:02) (111/68 - 118/74)  BP(mean): --  RR: 18 (07 Dec 2019 00:02) (18 - 18)  SpO2: 99% (07 Dec 2019 04:52) (98% - 99%)    PHYSICAL EXAM:  GENERAL: NAD  HEAD:  NC/AT  EYES: EOMI, white sclerae  ENMT: MMM, no oropharyngeal lesions or erythema appreciated, dentition well appearing  Neck: trachea midline, no appreciable masses  Pulm: increased work of breathing, expiratory wheezing b/l  CV: S1&S2+, RRR, no murmurs, rubs or gallops  ABDOMEN: soft, nontender, nondistended  : no cva tenderness, no marin placed  EXTREMITIES:  no appreciable edema in b/l LE  Neuro: A&Ox3, no focal deficits  SKIN: warm and dry, no visible rash    Consultant(s) Notes Reviewed:  [x ] YES  [ ] NO  Care Discussed with Consultants/Other Providers [ x] YES  [ ] NO    LABS:        RADIOLOGY & ADDITIONAL TESTS:    Imaging Personally Reviewed:  [ ] YES  [ ] NO  MedsMEDICATIONS  (STANDING):  albuterol/ipratropium for Nebulization 3 milliLiter(s) Nebulizer every 6 hours  benzonatate 100 milliGRAM(s) Oral every 8 hours  budesonide  80 MICROgram(s)/formoterol 4.5 MICROgram(s) Inhaler 2 Puff(s) Inhalation two times a day  doxycycline hyclate Capsule 100 milliGRAM(s) Oral every 12 hours  enoxaparin Injectable 40 milliGRAM(s) SubCutaneous daily  fluticasone propionate 50 MICROgram(s)/spray Nasal Spray 1 Spray(s) Both Nostrils two times a day  influenza   Vaccine 0.5 milliLiter(s) IntraMuscular once  methylPREDNISolone sodium succinate Injectable 40 milliGRAM(s) IV Push two times a day  montelukast 10 milliGRAM(s) Oral daily  pantoprazole    Tablet 40 milliGRAM(s) Oral before breakfast  sodium chloride 3%  Inhalation 5 milliLiter(s) Inhalation two times a day    MEDICATIONS  (PRN):  ibuprofen  Tablet. 600 milliGRAM(s) Oral every 8 hours PRN Moderate Pain (4 - 6)      HEALTH ISSUES - PROBLEM Dx:  Prophylactic measure: Prophylactic measure  GERD (gastroesophageal reflux disease): GERD (gastroesophageal reflux disease)  Obesity: Obesity  Allergic rhinitis: Allergic rhinitis  TEODORA (obstructive sleep apnea): TEODORA (obstructive sleep apnea)  Parainfluenza: Parainfluenza  Severe asthma with exacerbation, unspecified whether persistent: Severe asthma with exacerbation, unspecified whether persistent  SOB (shortness of breath): SOB (shortness of breath)  DVT prophylaxis: DVT prophylaxis  H/O gastroesophageal reflux (GERD): H/O gastroesophageal reflux (GERD)  Anemia, iron deficiency: Anemia, iron deficiency  TEODORA on CPAP: TEODORA on CPAP  Asthma exacerbation, non-allergic, unspecified asthma severity: Asthma exacerbation, non-allergic, unspecified asthma severity KWAN, MARISA  44y  Female    Complaints:  Subjective:     Pt continues to have coughs and difficulty breathing, although improved since admission, tolerating regular duoneb treatment well, has not required additional breathing treatments. Refusing lovenox at this time, but reports walking around the room, exercising and stretching. Denies fevers, chills, chest p/p, sob, n/v/d.     Vital Signs Last 24 Hrs  T(C): 36.9 (07 Dec 2019 00:02), Max: 36.9 (07 Dec 2019 00:02)  T(F): 98.4 (07 Dec 2019 00:02), Max: 98.4 (07 Dec 2019 00:02)  HR: 80 (07 Dec 2019 04:52) (79 - 89)  BP: 111/68 (07 Dec 2019 00:02) (111/68 - 118/74)  BP(mean): --  RR: 18 (07 Dec 2019 00:02) (18 - 18)  SpO2: 99% (07 Dec 2019 04:52) (98% - 99%)    PHYSICAL EXAM:  GENERAL: NAD  HEAD:  NC/AT  EYES: EOMI, white sclerae  ENMT: MMM, no oropharyngeal lesions or erythema appreciated, dentition well appearing  Neck: trachea midline, no appreciable masses  Pulm: increased work of breathing, expiratory wheezing b/l  CV: S1&S2+, RRR, no murmurs, rubs or gallops  ABDOMEN: soft, nontender, nondistended  : no cva tenderness, no marin placed  EXTREMITIES:  no appreciable edema in b/l LE  Neuro: A&Ox3, no focal deficits  SKIN: warm and dry, no visible rash    Consultant(s) Notes Reviewed:  [x ] YES  [ ] NO  Care Discussed with Consultants/Other Providers [ x] YES  [ ] NO    LABS:        RADIOLOGY & ADDITIONAL TESTS:    Imaging Personally Reviewed:  [ ] YES  [ ] NO  MedsMEDICATIONS  (STANDING):  albuterol/ipratropium for Nebulization 3 milliLiter(s) Nebulizer every 6 hours  benzonatate 100 milliGRAM(s) Oral every 8 hours  budesonide  80 MICROgram(s)/formoterol 4.5 MICROgram(s) Inhaler 2 Puff(s) Inhalation two times a day  doxycycline hyclate Capsule 100 milliGRAM(s) Oral every 12 hours  enoxaparin Injectable 40 milliGRAM(s) SubCutaneous daily  fluticasone propionate 50 MICROgram(s)/spray Nasal Spray 1 Spray(s) Both Nostrils two times a day  influenza   Vaccine 0.5 milliLiter(s) IntraMuscular once  methylPREDNISolone sodium succinate Injectable 40 milliGRAM(s) IV Push two times a day  montelukast 10 milliGRAM(s) Oral daily  pantoprazole    Tablet 40 milliGRAM(s) Oral before breakfast  sodium chloride 3%  Inhalation 5 milliLiter(s) Inhalation two times a day    MEDICATIONS  (PRN):  ibuprofen  Tablet. 600 milliGRAM(s) Oral every 8 hours PRN Moderate Pain (4 - 6)      HEALTH ISSUES - PROBLEM Dx:  Prophylactic measure: Prophylactic measure  GERD (gastroesophageal reflux disease): GERD (gastroesophageal reflux disease)  Obesity: Obesity  Allergic rhinitis: Allergic rhinitis  TEODORA (obstructive sleep apnea): TEODORA (obstructive sleep apnea)  Parainfluenza: Parainfluenza  Severe asthma with exacerbation, unspecified whether persistent: Severe asthma with exacerbation, unspecified whether persistent  SOB (shortness of breath): SOB (shortness of breath)  DVT prophylaxis: DVT prophylaxis  H/O gastroesophageal reflux (GERD): H/O gastroesophageal reflux (GERD)  Anemia, iron deficiency: Anemia, iron deficiency  TEODORA on CPAP: TEODORA on CPAP  Asthma exacerbation, non-allergic, unspecified asthma severity: Asthma exacerbation, non-allergic, unspecified asthma severity

## 2019-12-07 NOTE — PROGRESS NOTE ADULT - SUBJECTIVE AND OBJECTIVE BOX
[facility] - Division of Pulmonary, Critical Care and Sleep Medicine   Please call [phone] between 8am-pm weekdays, [phone] after hours and weekends    Interval Events: Continues to cough and wheeze - slightly improved overall. Cough worse at night, +postnasal drip    REVIEW OF SYSTEMS:  CV: [- ] chest pain   Resp: [+ ] cough [+ ] shortness of breath   [ x] All other systems negative  [ ] Unable to assess ROS because ________    OBJECTIVE:  ICU Vital Signs Last 24 Hrs  T(C): 36.9 (07 Dec 2019 00:02), Max: 36.9 (07 Dec 2019 00:02)  T(F): 98.4 (07 Dec 2019 00:02), Max: 98.4 (07 Dec 2019 00:02)  HR: 84 (07 Dec 2019 11:17) (80 - 89)  BP: 111/68 (07 Dec 2019 00:02) (111/68 - 118/72)  BP(mean): --  ABP: --  ABP(mean): --  RR: 18 (07 Dec 2019 00:02) (18 - 18)  SpO2: 98% (07 Dec 2019 11:17) (98% - 99%)        12-06 @ 07:01  -  12-07 @ 07:00  --------------------------------------------------------  IN: 750 mL / OUT: 0 mL / NET: 750 mL      CAPILLARY BLOOD GLUCOSE          PHYSICAL EXAM:  General: NAD  HEENT: NC/AT  Neck: supple  Respiratory:  moving air throughout, scattered end expiratory wheezes, no crackles or rhonchi  Cardiovascular:  RRR, no m/r/g  Abdomen: soft, NT/ND, +BS  Extremities: no clubbing, cyanosis or edema, warm  Skin: no rash  Neurological: AAOx3, non focal exam  Psychiatry: not anxious appearing, normal affect and mood    HOSPITAL MEDICATIONS:  MEDICATIONS  (STANDING):  albuterol/ipratropium for Nebulization 3 milliLiter(s) Nebulizer every 6 hours  benzonatate 100 milliGRAM(s) Oral every 8 hours  budesonide  80 MICROgram(s)/formoterol 4.5 MICROgram(s) Inhaler 2 Puff(s) Inhalation two times a day  doxycycline hyclate Capsule 100 milliGRAM(s) Oral every 12 hours  enoxaparin Injectable 40 milliGRAM(s) SubCutaneous daily  fluticasone propionate 50 MICROgram(s)/spray Nasal Spray 1 Spray(s) Both Nostrils two times a day  influenza   Vaccine 0.5 milliLiter(s) IntraMuscular once  methylPREDNISolone sodium succinate Injectable 40 milliGRAM(s) IV Push two times a day  montelukast 10 milliGRAM(s) Oral daily  pantoprazole    Tablet 40 milliGRAM(s) Oral before breakfast  sodium chloride 3%  Inhalation 5 milliLiter(s) Inhalation two times a day    MEDICATIONS  (PRN):  ibuprofen  Tablet. 600 milliGRAM(s) Oral every 8 hours PRN Moderate Pain (4 - 6)      LABS:                        8.9    12.49 )-----------( 504      ( 07 Dec 2019 11:54 )             30.8     Hgb Trend: 8.9<--, 8.1<--, 9.0<--  12-07    137  |  99  |  18  ----------------------------<  138<H>  4.2   |  23  |  0.52    Ca    8.5      07 Dec 2019 07:58    TPro  7.2  /  Alb  4.2  /  TBili  0.2  /  DBili  x   /  AST  18  /  ALT  22  /  AlkPhos  60  12-07    Creatinine Trend: 0.52<--, 0.63<--, 0.74<--            MICROBIOLOGY:     RADIOLOGY:  [x ] Reviewed and interpreted by me

## 2019-12-08 LAB
ALBUMIN SERPL ELPH-MCNC: 4.3 G/DL — SIGNIFICANT CHANGE UP (ref 3.3–5)
ALP SERPL-CCNC: 59 U/L — SIGNIFICANT CHANGE UP (ref 40–120)
ALT FLD-CCNC: 20 U/L — SIGNIFICANT CHANGE UP (ref 10–45)
ANION GAP SERPL CALC-SCNC: 13 MMOL/L — SIGNIFICANT CHANGE UP (ref 5–17)
AST SERPL-CCNC: 12 U/L — SIGNIFICANT CHANGE UP (ref 10–40)
BASOPHILS # BLD AUTO: 0.07 K/UL — SIGNIFICANT CHANGE UP (ref 0–0.2)
BASOPHILS NFR BLD AUTO: 0.4 % — SIGNIFICANT CHANGE UP (ref 0–2)
BILIRUB SERPL-MCNC: 0.3 MG/DL — SIGNIFICANT CHANGE UP (ref 0.2–1.2)
BUN SERPL-MCNC: 20 MG/DL — SIGNIFICANT CHANGE UP (ref 7–23)
CALCIUM SERPL-MCNC: 8.1 MG/DL — LOW (ref 8.4–10.5)
CHLORIDE SERPL-SCNC: 99 MMOL/L — SIGNIFICANT CHANGE UP (ref 96–108)
CO2 SERPL-SCNC: 24 MMOL/L — SIGNIFICANT CHANGE UP (ref 22–31)
CREAT SERPL-MCNC: 0.66 MG/DL — SIGNIFICANT CHANGE UP (ref 0.5–1.3)
EOSINOPHIL # BLD AUTO: 0 K/UL — SIGNIFICANT CHANGE UP (ref 0–0.5)
EOSINOPHIL NFR BLD AUTO: 0 % — SIGNIFICANT CHANGE UP (ref 0–6)
GLUCOSE SERPL-MCNC: 128 MG/DL — HIGH (ref 70–99)
HCT VFR BLD CALC: 32.5 % — LOW (ref 34.5–45)
HGB BLD-MCNC: 9.7 G/DL — LOW (ref 11.5–15.5)
IMM GRANULOCYTES NFR BLD AUTO: 3.3 % — HIGH (ref 0–1.5)
LYMPHOCYTES # BLD AUTO: 14.6 % — SIGNIFICANT CHANGE UP (ref 13–44)
LYMPHOCYTES # BLD AUTO: 2.49 K/UL — SIGNIFICANT CHANGE UP (ref 1–3.3)
MAGNESIUM SERPL-MCNC: 2.2 MG/DL — SIGNIFICANT CHANGE UP (ref 1.6–2.6)
MCHC RBC-ENTMCNC: 22.6 PG — LOW (ref 27–34)
MCHC RBC-ENTMCNC: 29.8 GM/DL — LOW (ref 32–36)
MCV RBC AUTO: 75.8 FL — LOW (ref 80–100)
MONOCYTES # BLD AUTO: 0.94 K/UL — HIGH (ref 0–0.9)
MONOCYTES NFR BLD AUTO: 5.5 % — SIGNIFICANT CHANGE UP (ref 2–14)
NEUTROPHILS # BLD AUTO: 13.05 K/UL — HIGH (ref 1.8–7.4)
NEUTROPHILS NFR BLD AUTO: 76.2 % — SIGNIFICANT CHANGE UP (ref 43–77)
PHOSPHATE SERPL-MCNC: 3.9 MG/DL — SIGNIFICANT CHANGE UP (ref 2.5–4.5)
PLATELET # BLD AUTO: 635 K/UL — HIGH (ref 150–400)
POTASSIUM SERPL-MCNC: 3.7 MMOL/L — SIGNIFICANT CHANGE UP (ref 3.5–5.3)
POTASSIUM SERPL-SCNC: 3.7 MMOL/L — SIGNIFICANT CHANGE UP (ref 3.5–5.3)
PROT SERPL-MCNC: 7.4 G/DL — SIGNIFICANT CHANGE UP (ref 6–8.3)
RBC # BLD: 4.29 M/UL — SIGNIFICANT CHANGE UP (ref 3.8–5.2)
RBC # FLD: 18.5 % — HIGH (ref 10.3–14.5)
SODIUM SERPL-SCNC: 136 MMOL/L — SIGNIFICANT CHANGE UP (ref 135–145)
WBC # BLD: 17.11 K/UL — HIGH (ref 3.8–10.5)
WBC # FLD AUTO: 17.11 K/UL — HIGH (ref 3.8–10.5)

## 2019-12-08 PROCEDURE — 99233 SBSQ HOSP IP/OBS HIGH 50: CPT

## 2019-12-08 PROCEDURE — 99232 SBSQ HOSP IP/OBS MODERATE 35: CPT

## 2019-12-08 RX ORDER — IBUPROFEN 200 MG
800 TABLET ORAL EVERY 6 HOURS
Refills: 0 | Status: DISCONTINUED | OUTPATIENT
Start: 2019-12-08 | End: 2019-12-10

## 2019-12-08 RX ORDER — MAGNESIUM SULFATE 500 MG/ML
2 VIAL (ML) INJECTION ONCE
Refills: 0 | Status: COMPLETED | OUTPATIENT
Start: 2019-12-08 | End: 2019-12-08

## 2019-12-08 RX ADMIN — Medication 800 MILLIGRAM(S): at 23:55

## 2019-12-08 RX ADMIN — Medication 1 SPRAY(S): at 06:01

## 2019-12-08 RX ADMIN — Medication 600 MILLIGRAM(S): at 06:59

## 2019-12-08 RX ADMIN — Medication 100 MILLIGRAM(S): at 06:01

## 2019-12-08 RX ADMIN — Medication 3 MILLILITER(S): at 23:22

## 2019-12-08 RX ADMIN — Medication 600 MILLIGRAM(S): at 05:59

## 2019-12-08 RX ADMIN — SODIUM CHLORIDE 4 MILLILITER(S): 9 INJECTION INTRAMUSCULAR; INTRAVENOUS; SUBCUTANEOUS at 06:02

## 2019-12-08 RX ADMIN — Medication 0.5 MILLIGRAM(S): at 06:02

## 2019-12-08 RX ADMIN — Medication 3 MILLILITER(S): at 06:00

## 2019-12-08 RX ADMIN — Medication 40 MILLIGRAM(S): at 06:01

## 2019-12-08 RX ADMIN — SODIUM CHLORIDE 5 MILLILITER(S): 9 INJECTION INTRAMUSCULAR; INTRAVENOUS; SUBCUTANEOUS at 18:04

## 2019-12-08 RX ADMIN — MONTELUKAST 10 MILLIGRAM(S): 4 TABLET, CHEWABLE ORAL at 12:56

## 2019-12-08 RX ADMIN — Medication 100 MILLIGRAM(S): at 23:22

## 2019-12-08 RX ADMIN — Medication 3 MILLILITER(S): at 12:55

## 2019-12-08 RX ADMIN — Medication 50 GRAM(S): at 14:09

## 2019-12-08 RX ADMIN — Medication 3 MILLILITER(S): at 00:00

## 2019-12-08 RX ADMIN — Medication 3 MILLILITER(S): at 18:05

## 2019-12-08 RX ADMIN — Medication 800 MILLIGRAM(S): at 23:25

## 2019-12-08 RX ADMIN — PANTOPRAZOLE SODIUM 40 MILLIGRAM(S): 20 TABLET, DELAYED RELEASE ORAL at 06:03

## 2019-12-08 RX ADMIN — Medication 0.5 MILLIGRAM(S): at 18:04

## 2019-12-08 RX ADMIN — Medication 800 MILLIGRAM(S): at 12:55

## 2019-12-08 RX ADMIN — Medication 800 MILLIGRAM(S): at 13:45

## 2019-12-08 RX ADMIN — ENOXAPARIN SODIUM 40 MILLIGRAM(S): 100 INJECTION SUBCUTANEOUS at 12:56

## 2019-12-08 RX ADMIN — Medication 40 MILLIGRAM(S): at 18:04

## 2019-12-08 RX ADMIN — Medication 1 SPRAY(S): at 18:04

## 2019-12-08 RX ADMIN — Medication 100 MILLIGRAM(S): at 13:12

## 2019-12-08 NOTE — PROGRESS NOTE ADULT - PROBLEM SELECTOR PLAN 1
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 Glenn Graham following  - IV solumedrol 40 mg q12, plan to transition to prednisone 40 mg QD  - Duonebs q6hrs   - Budesonide nebs BID  - Flonase BID, nasal saline  - Tessalon Perles q8hrs, hycodan qhs  - Acapella device  - monitor peak flow tid, incentive spirometer

## 2019-12-08 NOTE — PROGRESS NOTE ADULT - ASSESSMENT
45 yo F lifelong nonsmoker, with a h/o moderate persistent asthma. GERD, obesity presents with 2 weeks of persistent wheezing and nonproductive cough. Developed URI symptoms of nasal congestion, nonproductive cough, progressed to wheezing. Seen in Dr. Elizabeth's office, started on Prednisone 20 mg , attempted taper to 10mg with worsening symptoms.  Pt was seen in [facility] ED last week, given IV steroids and was sent home to follow up with pulm.  Pt states on 11/29 was started on Doxy, nebulized budesonide, Duonebs, Spiriva, Qvar, Benzonatate, Nexium and continued on prednisone- pt notes had persistent sxs despite meds. Increased back to 40 mg 2 days ago and started on which she began on 12/3 without relief.  Pt notes sxs began 2 weeks ago when she developed URI sxs- dry nonproductive cough, sore throat, post nasal drip. Pt notes 2 episodes of post-tussive emesis over the weekend otherwise denies n/v.   ED VS - hypertensive, not hypoxic  Has received Solumedrol 40 mg IV, Duoneb x1 and Albuterol neb. 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 Not hypoxic  Doubt superimposed pneumonia   c/w IV steroids- Solumedrol 40 mg Q 12hours- would change to Prednisone 40 mg daily (I have ordered)  Bronchodilators - Duonebs Q6 hours standing, Budesonide nebs bid   Flonase BID and nasal saline   Tessalon perles Q8 hours, continues to complain of significant nocturnal symptoms - c/w Hycodan QHS   Acapella device Q 4hours to aid in airway clearance, incentive spirometer, monitor peak flows 3x a day  GERD- H2 blocker  DVT ppx  If able, would ambulate patient and monitor symptoms (currently on contact/droplet precautions - nurse to speak with infection control)  Dispo - anticipate discharge either Mon or Tuesday  Dr. Elizabeth to follow up

## 2019-12-08 NOTE — PROGRESS NOTE ADULT - SUBJECTIVE AND OBJECTIVE BOX
Patient is a 44y old  Female who presents with a chief complaint of Asthma Exacerbation (07 Dec 2019 15:55)      SUBJECTIVE / OVERNIGHT EVENTS:    MEDICATIONS  (STANDING):  albuterol/ipratropium for Nebulization 3 milliLiter(s) Nebulizer every 6 hours  benzonatate 100 milliGRAM(s) Oral every 8 hours  buDESOnide    Inhalation Suspension 0.5 milliGRAM(s) Inhalation two times a day  enoxaparin Injectable 40 milliGRAM(s) SubCutaneous daily  fluticasone propionate 50 MICROgram(s)/spray Nasal Spray 1 Spray(s) Both Nostrils two times a day  influenza   Vaccine 0.5 milliLiter(s) IntraMuscular once  methylPREDNISolone sodium succinate Injectable 40 milliGRAM(s) IV Push two times a day  montelukast 10 milliGRAM(s) Oral daily  pantoprazole    Tablet 40 milliGRAM(s) Oral before breakfast  sodium chloride 3%  Inhalation 5 milliLiter(s) Inhalation two times a day    MEDICATIONS  (PRN):  ibuprofen  Tablet. 600 milliGRAM(s) Oral every 8 hours PRN Moderate Pain (4 - 6)      Vital Signs Last 24 Hrs  T(C): 37.1 (08 Dec 2019 11:11), Max: 37.1 (08 Dec 2019 11:11)  T(F): 98.8 (08 Dec 2019 11:11), Max: 98.8 (08 Dec 2019 11:11)  HR: 92 (08 Dec 2019 11:11) (79 - 92)  BP: 152/92 (08 Dec 2019 11:11) (120/74 - 152/92)  BP(mean): --  RR: 18 (08 Dec 2019 11:11) (18 - 19)  SpO2: 99% (08 Dec 2019 11:11) (97% - 100%)  CAPILLARY BLOOD GLUCOSE        I&O's Summary    07 Dec 2019 07:01  -  08 Dec 2019 07:00  --------------------------------------------------------  IN: 300 mL / OUT: 0 mL / NET: 300 mL        PHYSICAL EXAM:  Vital Signs Last 24 Hrs  T(C): 37.1 (12-08-19 @ 11:11)  T(F): 98.8 (12-08-19 @ 11:11), Max: 98.8 (12-08-19 @ 11:11)  HR: 92 (12-08-19 @ 11:11) (79 - 92)  BP: 152/92 (12-08-19 @ 11:11)  BP(mean): --  RR: 18 (12-08-19 @ 11:11) (18 - 19)  SpO2: 99% (12-08-19 @ 11:11) (97% - 100%)  Wt(kg): --    12-07 @ 07:01  -  12-08 @ 07:00  --------------------------------------------------------  IN: 300 mL / OUT: 0 mL / NET: 300 mL      Constitutional: NAD, awake and alert  EYES: EOMI  ENT:  Normal Hearing, no tonsillar exudates   Neck: Soft and supple , no thyromegaly   Respiratory: Breath sounds are clear bilaterally, No wheezing, rales or rhonchi  Cardiovascular: S1 and S2, regular rate and rhythm, no Murmurs, gallops or rubs, no JVD,    Gastrointestinal: Bowel Sounds present, soft, nontender, nondistended, no guarding, no rebound  Extremities: No cyanosis or clubbing; warm to touch  Vascular: 2+ peripheral pulses lower ex  Neurological: No focal deficits, CN II-XII intact bilaterally, sensation to light touch intact in all extremities, gait intact. Pupils are equally reactive to light and symmetrical in size.   Musculoskeletal: 5/5 strength b/l upper and lower extremities; no joint swelling.  Skin: No rashes  Psych: no depression or anhedonia, AAOx3  HEME: no bruises, no nose bleeds      LABS:                        9.7    17.11 )-----------( 635      ( 08 Dec 2019 09:36 )             32.5     12-08    136  |  99  |  20  ----------------------------<  128<H>  3.7   |  24  |  0.66    Ca    8.1<L>      08 Dec 2019 07:09  Phos  3.9     12-08  Mg     2.2     12-08    TPro  7.4  /  Alb  4.3  /  TBili  0.3  /  DBili  x   /  AST  12  /  ALT  20  /  AlkPhos  59  12-08              RADIOLOGY & ADDITIONAL TESTS:    Imaging Personally Reviewed:    Consultant(s) Notes Reviewed:      Care Discussed with Consultants/Other Providers: Patient is a 44y old  Female who presents with a chief complaint of Asthma Exacerbation (07 Dec 2019 15:55)      SUBJECTIVE / OVERNIGHT EVENTS:    Pt seen and examined at bedside, Reports improvement in respiratory symptoms however deep breaths provoke coughing episode. No chest pain/SOB. PATRICK when going to use bathroom. tolerating po intake, having regular BM.     MEDICATIONS  (STANDING):  albuterol/ipratropium for Nebulization 3 milliLiter(s) Nebulizer every 6 hours  benzonatate 100 milliGRAM(s) Oral every 8 hours  buDESOnide    Inhalation Suspension 0.5 milliGRAM(s) Inhalation two times a day  enoxaparin Injectable 40 milliGRAM(s) SubCutaneous daily  fluticasone propionate 50 MICROgram(s)/spray Nasal Spray 1 Spray(s) Both Nostrils two times a day  influenza   Vaccine 0.5 milliLiter(s) IntraMuscular once  methylPREDNISolone sodium succinate Injectable 40 milliGRAM(s) IV Push two times a day  montelukast 10 milliGRAM(s) Oral daily  pantoprazole    Tablet 40 milliGRAM(s) Oral before breakfast  sodium chloride 3%  Inhalation 5 milliLiter(s) Inhalation two times a day    MEDICATIONS  (PRN):  ibuprofen  Tablet. 600 milliGRAM(s) Oral every 8 hours PRN Moderate Pain (4 - 6)      Vital Signs Last 24 Hrs  T(C): 37.1 (08 Dec 2019 11:11), Max: 37.1 (08 Dec 2019 11:11)  T(F): 98.8 (08 Dec 2019 11:11), Max: 98.8 (08 Dec 2019 11:11)  HR: 92 (08 Dec 2019 11:11) (79 - 92)  BP: 152/92 (08 Dec 2019 11:11) (120/74 - 152/92)  BP(mean): --  RR: 18 (08 Dec 2019 11:11) (18 - 19)  SpO2: 99% (08 Dec 2019 11:11) (97% - 100%)  CAPILLARY BLOOD GLUCOSE        I&O's Summary    07 Dec 2019 07:01  -  08 Dec 2019 07:00  --------------------------------------------------------  IN: 300 mL / OUT: 0 mL / NET: 300 mL        PHYSICAL EXAM:  Vital Signs Last 24 Hrs  T(C): 37.1 (12-08-19 @ 11:11)  T(F): 98.8 (12-08-19 @ 11:11), Max: 98.8 (12-08-19 @ 11:11)  HR: 92 (12-08-19 @ 11:11) (79 - 92)  BP: 152/92 (12-08-19 @ 11:11)  BP(mean): --  RR: 18 (12-08-19 @ 11:11) (18 - 19)  SpO2: 99% (12-08-19 @ 11:11) (97% - 100%)  Wt(kg): --    12-07 @ 07:01  -  12-08 @ 07:00  --------------------------------------------------------  IN: 300 mL / OUT: 0 mL / NET: 300 mL      Constitutional: NAD, awake and alert  EYES: EOMI  ENT:  Normal Hearing, no tonsillar exudates   Neck: Soft and supple , no thyromegaly   Respiratory: Breath sounds are clear bilaterally, No wheezing, rales or rhonchi  Cardiovascular: S1 and S2, regular rate and rhythm, no Murmurs, gallops or rubs, no JVD,    Gastrointestinal: Bowel Sounds present, soft, nontender, nondistended, no guarding, no rebound  Extremities: No cyanosis or clubbing; warm to touch  Vascular: 2+ peripheral pulses lower ex  Neurological: No focal deficits, CN II-XII intact bilaterally, sensation to light touch intact in all extremities, gait intact. Pupils are equally reactive to light and symmetrical in size.   Musculoskeletal: 5/5 strength b/l upper and lower extremities; no joint swelling.  Skin: No rashes  Psych: no depression or anhedonia, AAOx3  HEME: no bruises, no nose bleeds      LABS:                        9.7    17.11 )-----------( 635      ( 08 Dec 2019 09:36 )             32.5     12-08    136  |  99  |  20  ----------------------------<  128<H>  3.7   |  24  |  0.66    Ca    8.1<L>      08 Dec 2019 07:09  Phos  3.9     12-08  Mg     2.2     12-08    TPro  7.4  /  Alb  4.3  /  TBili  0.3  /  DBili  x   /  AST  12  /  ALT  20  /  AlkPhos  59  12-08              RADIOLOGY & ADDITIONAL TESTS:    Imaging Personally Reviewed:    Consultant(s) Notes Reviewed:      Care Discussed with Consultants/Other Providers: Patient is a 44y old  Female who presents with a chief complaint of Asthma Exacerbation (07 Dec 2019 15:55)      SUBJECTIVE / OVERNIGHT EVENTS:    Pt seen and examined at bedside, Reports improvement in respiratory symptoms however deep breaths provoke coughing episode. No chest pain/SOB. PATRICK when going to use bathroom. tolerating po intake, having regular BM.     MEDICATIONS  (STANDING):  albuterol/ipratropium for Nebulization 3 milliLiter(s) Nebulizer every 6 hours  benzonatate 100 milliGRAM(s) Oral every 8 hours  buDESOnide    Inhalation Suspension 0.5 milliGRAM(s) Inhalation two times a day  enoxaparin Injectable 40 milliGRAM(s) SubCutaneous daily  fluticasone propionate 50 MICROgram(s)/spray Nasal Spray 1 Spray(s) Both Nostrils two times a day  influenza   Vaccine 0.5 milliLiter(s) IntraMuscular once  methylPREDNISolone sodium succinate Injectable 40 milliGRAM(s) IV Push two times a day  montelukast 10 milliGRAM(s) Oral daily  pantoprazole    Tablet 40 milliGRAM(s) Oral before breakfast  sodium chloride 3%  Inhalation 5 milliLiter(s) Inhalation two times a day    MEDICATIONS  (PRN):  ibuprofen  Tablet. 600 milliGRAM(s) Oral every 8 hours PRN Moderate Pain (4 - 6)      Vital Signs Last 24 Hrs  T(C): 37.1 (08 Dec 2019 11:11), Max: 37.1 (08 Dec 2019 11:11)  T(F): 98.8 (08 Dec 2019 11:11), Max: 98.8 (08 Dec 2019 11:11)  HR: 92 (08 Dec 2019 11:11) (79 - 92)  BP: 152/92 (08 Dec 2019 11:11) (120/74 - 152/92)  BP(mean): --  RR: 18 (08 Dec 2019 11:11) (18 - 19)  SpO2: 99% (08 Dec 2019 11:11) (97% - 100%)  CAPILLARY BLOOD GLUCOSE        I&O's Summary    07 Dec 2019 07:01  -  08 Dec 2019 07:00  --------------------------------------------------------  IN: 300 mL / OUT: 0 mL / NET: 300 mL        PHYSICAL EXAM:  Vital Signs Last 24 Hrs  T(C): 37.1 (12-08-19 @ 11:11)  T(F): 98.8 (12-08-19 @ 11:11), Max: 98.8 (12-08-19 @ 11:11)  HR: 92 (12-08-19 @ 11:11) (79 - 92)  BP: 152/92 (12-08-19 @ 11:11)  BP(mean): --  RR: 18 (12-08-19 @ 11:11) (18 - 19)  SpO2: 99% (12-08-19 @ 11:11) (97% - 100%)  Wt(kg): --    12-07 @ 07:01  -  12-08 @ 07:00  --------------------------------------------------------  IN: 300 mL / OUT: 0 mL / NET: 300 mL      Constitutional: NAD, awake and alert  EYES: EOMI  ENT:  Normal Hearing, no tonsillar exudates   Neck: Soft and supple , no thyromegaly   Respiratory: Breath sounds are clear bilaterally, wheezing with deep inspiration, no rales or rhonchi  Cardiovascular: S1 and S2, regular rate and rhythm, no Murmurs, gallops or rubs, no JVD,    Gastrointestinal: Bowel Sounds present, soft, nontender, nondistended, no guarding, no rebound  Extremities: No cyanosis or clubbing; warm to touch  Vascular: 2+ peripheral pulses lower ex  Neurological: No focal deficits, CN II-XII intact bilaterally, sensation to light touch intact in all extremities, gait intact. Pupils are equally reactive to light and symmetrical in size.   Musculoskeletal: 5/5 strength b/l upper and lower extremities; no joint swelling.  Skin: No rashes  Psych: no depression or anhedonia, AAOx3  HEME: no bruises, no nose bleeds      LABS:                        9.7    17.11 )-----------( 635      ( 08 Dec 2019 09:36 )             32.5     12-08    136  |  99  |  20  ----------------------------<  128<H>  3.7   |  24  |  0.66    Ca    8.1<L>      08 Dec 2019 07:09  Phos  3.9     12-08  Mg     2.2     12-08    TPro  7.4  /  Alb  4.3  /  TBili  0.3  /  DBili  x   /  AST  12  /  ALT  20  /  AlkPhos  59  12-08              RADIOLOGY & ADDITIONAL TESTS:    Imaging Personally Reviewed:    Consultant(s) Notes Reviewed:      Care Discussed with Consultants/Other Providers:

## 2019-12-09 ENCOUNTER — TRANSCRIPTION ENCOUNTER (OUTPATIENT)
Age: 44
End: 2019-12-09

## 2019-12-09 PROCEDURE — 99232 SBSQ HOSP IP/OBS MODERATE 35: CPT

## 2019-12-09 PROCEDURE — 99232 SBSQ HOSP IP/OBS MODERATE 35: CPT | Mod: GC

## 2019-12-09 RX ADMIN — Medication 3 MILLILITER(S): at 17:10

## 2019-12-09 RX ADMIN — PANTOPRAZOLE SODIUM 40 MILLIGRAM(S): 20 TABLET, DELAYED RELEASE ORAL at 06:26

## 2019-12-09 RX ADMIN — Medication 10 MILLIGRAM(S): at 17:08

## 2019-12-09 RX ADMIN — Medication 0.5 MILLIGRAM(S): at 17:10

## 2019-12-09 RX ADMIN — Medication 800 MILLIGRAM(S): at 13:27

## 2019-12-09 RX ADMIN — Medication 100 MILLIGRAM(S): at 05:05

## 2019-12-09 RX ADMIN — Medication 800 MILLIGRAM(S): at 13:57

## 2019-12-09 RX ADMIN — MONTELUKAST 10 MILLIGRAM(S): 4 TABLET, CHEWABLE ORAL at 13:25

## 2019-12-09 RX ADMIN — Medication 3 MILLILITER(S): at 05:03

## 2019-12-09 RX ADMIN — Medication 10 MILLIGRAM(S): at 09:01

## 2019-12-09 RX ADMIN — Medication 40 MILLIGRAM(S): at 05:04

## 2019-12-09 RX ADMIN — Medication 3 MILLILITER(S): at 13:25

## 2019-12-09 RX ADMIN — Medication 100 MILLIGRAM(S): at 21:26

## 2019-12-09 RX ADMIN — SODIUM CHLORIDE 5 MILLILITER(S): 9 INJECTION INTRAMUSCULAR; INTRAVENOUS; SUBCUTANEOUS at 17:11

## 2019-12-09 RX ADMIN — Medication 1 SPRAY(S): at 17:10

## 2019-12-09 RX ADMIN — Medication 800 MILLIGRAM(S): at 22:25

## 2019-12-09 RX ADMIN — Medication 800 MILLIGRAM(S): at 21:25

## 2019-12-09 RX ADMIN — Medication 0.5 MILLIGRAM(S): at 05:03

## 2019-12-09 RX ADMIN — Medication 800 MILLIGRAM(S): at 07:00

## 2019-12-09 RX ADMIN — SODIUM CHLORIDE 5 MILLILITER(S): 9 INJECTION INTRAMUSCULAR; INTRAVENOUS; SUBCUTANEOUS at 05:04

## 2019-12-09 RX ADMIN — Medication 800 MILLIGRAM(S): at 06:29

## 2019-12-09 RX ADMIN — Medication 1 SPRAY(S): at 05:04

## 2019-12-09 RX ADMIN — Medication 100 MILLIGRAM(S): at 13:25

## 2019-12-09 NOTE — DISCHARGE NOTE PROVIDER - NSDCFUSCHEDAPPT_GEN_ALL_CORE_FT
MARISA KWAN ; 12/17/2019 ; [facility] [facility] MARISA KWAN ; 12/17/2019 ; [facility] [address] [facility]

## 2019-12-09 NOTE — PROGRESS NOTE ADULT - ASSESSMENT
44F PMH of chronic severe asthma (no intubation, no prior ICU, last exacerbation was 2 years ago), iron deficiency anemia, TEODORA on CPAP presents with wheezing and shortness of breath, likely asthma exacerbation 2/2 URI. RVP pos for parainfluenza. 44F PMH of chronic severe asthma (no intubation, no prior ICU, last exacerbation was 2 years ago), iron deficiency anemia, TEODORA on CPAP presents with wheezing and shortness of breath, likely asthma exacerbation 2/2 URI. RVP pos for parainfluenza. Currently on PO prednisone 50 mg, Duonebs q6hrs.

## 2019-12-09 NOTE — PROGRESS NOTE ADULT - PROBLEM SELECTOR PLAN 1
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 Glenn hicks following  - Transitioned to PO prednisone 50 mg qD for at least 5 days no taper  - Duonebs q6hrs   - Budesonide nebs BID  - Flonase BID, nasal saline  - Tessalon Perles q8hrs, hycodan qhs  - Acapella device  - monitor peak flow tid, incentive spirometer

## 2019-12-09 NOTE — PROGRESS NOTE ADULT - ASSESSMENT
43 yo F lifelong nonsmoker, with a h/o moderate persistent asthma. GERD, obesity presents with 2 weeks of persistent wheezing and nonproductive cough. Developed URI symptoms of nasal congestion, nonproductive cough, progressed to wheezing. Seen in Dr. Elizabeth's office, started on Prednisone 20 mg , attempted taper to 10mg with worsening symptoms.  Pt was seen in [facility] ED last week, given IV steroids and was sent home to follow up with pulm.  Pt states on 11/29 was started on Doxy, nebulized budesonide, Duonebs, Spiriva, Qvar, Benzonatate, Nexium and continued on prednisone- pt notes had persistent sxs despite meds. Increased back to 40 mg 2 days ago and started on which she began on 12/3 without relief.  Pt notes sxs began 2 weeks ago when she developed URI sxs- dry nonproductive cough, sore throat, post nasal drip. Pt notes 2 episodes of post-tussive emesis over the weekend otherwise denies n/v.    A/P: Asthma exacerbation, 2/2 parainfluenza- remains bronchospastic, but improving. Not hypoxic  NO evidence for superimposed pneumonia   c/w IV steroids-  Prednisone 40 mg day--would boost to at least 50mg per day  Bronchodilators - Duonebs Q6 hours standing, Budesonide nebs bid   Flonase BID and nasal saline   Tessalon perles Q8 hours, continues to complain of significant nocturnal symptoms - c/w Hycodan QHS   Acapella device Q 4hours to aid in airway clearance, incentive spirometer, monitor peak flows 3x a day  GERD- H2 blocker  DVT ppx===== able, would ambulate patient and monitor symptoms (currently on contact/droplet precautions - nurse to speak with infection control)  ****************  12/9-on po pred (very bronchospastic)-needs higher dose pred-no less than 50 for 5 days

## 2019-12-09 NOTE — DISCHARGE NOTE PROVIDER - NSDCCPCAREPLAN_GEN_ALL_CORE_FT
PRINCIPAL DISCHARGE DIAGNOSIS  Diagnosis: Severe asthma with exacerbation, unspecified whether persistent  Assessment and Plan of Treatment:       SECONDARY DISCHARGE DIAGNOSES  Diagnosis: Parainfluenza  Assessment and Plan of Treatment: When you came to the hospital you were have upper respiratory symptoms with runny nose and dry cough. PRINCIPAL DISCHARGE DIAGNOSIS  Diagnosis: Severe asthma with exacerbation, unspecified whether persistent  Assessment and Plan of Treatment: You came in with asthma exacerbation due to viral infection. You were given steroid and albuterol inhaler for the treatment. Please continue to take prednisone 60mg daily for next 4 days until 12/13, and take 50mg daily starting 12/14 until you follow up with Dr. Elizabeth to adjust your prednisone dosage. If you develop worsening shortness of breath, wheezing, cough, please come back to the hospital.      SECONDARY DISCHARGE DIAGNOSES  Diagnosis: Parainfluenza  Assessment and Plan of Treatment: When you came to the hospital you were have upper respiratory symptoms with runny nose and dry cough. You were found to have viral infection. You were treated mostly with supportive care. Please continue your medication for your asthma.

## 2019-12-09 NOTE — DISCHARGE NOTE PROVIDER - NSDCMRMEDTOKEN_GEN_ALL_CORE_FT
Advil Cold &amp; Sinus 200 mg-30 mg oral tablet: 1 tab(s) orally every 4 hours, As Needed  Astelin 137 mcg/inh nasal spray: 2 spray(s) into each nostril 2 times a day  benzonatate 200 mg oral capsule: 1 cap(s) orally 3 times a day  budesonide 0.5 mg/2 mL inhalation suspension: 2 milliliter(s) inhaled 3 times a day  doxycycline monohydrate 100 mg oral capsule: 1 cap(s) orally 2 times a day  DuoNeb 0.5 mg-2.5 mg/3 mL inhalation solution: 3 milliliter(s) inhaled 4 times a day, As Needed  Hydromet 5 mg-1.5 mg/5 mL oral syrup: 10 milliliter(s) orally once a day (at bedtime)  NexIUM 40 mg oral delayed release capsule: 1 cap(s) orally once a day  Pepcid 40 mg oral tablet: 1 tab(s) orally once a day (at bedtime)  Perforomist 20 mcg/2 mL inhalation solution: 2 milliliter(s) inhaled 2 times a day  predniSONE 10 mg oral tablet: 1 tab(s) orally once a day  Qvar 80 mcg/inh inhalation aerosol: 2 puff(s) inhaled 2 times a day  Spiriva 18 mcg inhalation capsule: 1 cap(s) inhaled once a day Advil Cold &amp; Sinus 200 mg-30 mg oral tablet: 1 tab(s) orally every 4 hours, As Needed  Astelin 137 mcg/inh nasal spray: 2 spray(s) into each nostril 2 times a day  benzonatate 200 mg oral capsule: 1 cap(s) orally 3 times a day  budesonide 0.5 mg/2 mL inhalation suspension: 2 milliliter(s) inhaled 3 times a day  doxycycline monohydrate 100 mg oral capsule: 1 cap(s) orally 2 times a day  DuoNeb 0.5 mg-2.5 mg/3 mL inhalation solution: 3 milliliter(s) inhaled 4 times a day, As Needed  fluticasone 50 mcg/inh nasal spray: 1 spray(s) nasal 2 times a day  Hydromet 5 mg-1.5 mg/5 mL oral syrup: 10 milliliter(s) orally once a day (at bedtime)  NexIUM 40 mg oral delayed release capsule: 1 cap(s) orally once a day  Pepcid 40 mg oral tablet: 1 tab(s) orally once a day (at bedtime)  Perforomist 20 mcg/2 mL inhalation solution: 2 milliliter(s) inhaled 2 times a day  predniSONE 10 mg oral tablet: 6 tab(s) orally once a day until 12/13, and starting 12/14,. take 5 tablets (50mg daily) afterward until you see Dr. Elizabeth.   Qvar 80 mcg/inh inhalation aerosol: 2 puff(s) inhaled 2 times a day  Singulair 10 mg oral tablet: 1 tab(s) orally once a day

## 2019-12-09 NOTE — PROGRESS NOTE ADULT - PROBLEM SELECTOR PLAN 3
Hb 9, baseline 9-10  Iron studies  No syncope, acitve bleeding, indication for transfusion at this time   will maintain active Type and screen Hb 9.7, baseline 9-10  Iron studies  No syncope, active bleeding, indication for transfusion at this time   will maintain active Type and screen

## 2019-12-09 NOTE — PROGRESS NOTE ADULT - SUBJECTIVE AND OBJECTIVE BOX
CHIEF COMPLAINT: f/up sob, asthmatic bronchitis, parainfluenza, osas, obesity, allergic rhinitis--better today but still tight    Interval Events: ambulated in room    REVIEW OF SYSTEMS:  Constitutional: No fevers or chills. No weight loss. + fatigue or generalized malaise.  Eyes: No itching or discharge from the eyes  ENT: No ear pain. No ear discharge. No nasal congestion. No post nasal drip. No epistaxis. No throat pain. No sore throat. No difficulty swallowing.   CV: No chest pain. No palpitations. No lightheadedness or dizziness.   Resp: No dyspnea at rest. + dyspnea on exertion. No orthopnea. + wheezing. + cough. No stridor. No sputum production. No chest pain with respiration.  GI: No nausea. No vomiting. No diarrhea.  MSK: No joint pain or pain in any extremities  Integumentary: No skin lesions. No pedal edema.  Neurological: No gross motor weakness. No sensory changes.  [+ ] All other systems negative  [ ] Unable to assess ROS because ________    OBJECTIVE:  ICU Vital Signs Last 24 Hrs  T(C): 36.5 (09 Dec 2019 00:02), Max: 37.1 (08 Dec 2019 11:11)  T(F): 97.7 (09 Dec 2019 00:02), Max: 98.8 (08 Dec 2019 11:11)  HR: 87 (09 Dec 2019 00:02) (82 - 95)  BP: 129/83 (09 Dec 2019 00:02) (101/71 - 152/92)  BP(mean): --  ABP: --  ABP(mean): --  RR: 18 (09 Dec 2019 00:02) (18 - 20)  SpO2: 99% (09 Dec 2019 00:02) (92% - 100%)        12-07 @ 07:01  -  12-08 @ 07:00  --------------------------------------------------------  IN: 300 mL / OUT: 0 mL / NET: 300 mL      CAPILLARY BLOOD GLUCOSE          PHYSICAL EXAM: NAD in bed  General: Awake, alert, oriented X 3.   HEENT: Atraumatic, normocephalic.                 Mallampatti Grade 3                No nasal congestion.                No tonsillar or pharyngeal exudates.  Lymph Nodes: No palpable lymphadenopathy  Neck: No JVD. No carotid bruit.   Respiratory: Normal chest expansion                         Normal percussion                         Normal and equal air entry                         diffuse exp wheeze, no rhonchi or rales.  Cardiovascular: S1 S2 normal. No murmurs, rubs or gallops.   Abdomen: Soft, non-tender, non-distended. No organomegaly. Normoactive bowel sounds.  Extremities: Warm to touch. Peripheral pulse palpable. No pedal edema.   Skin: No rashes or skin lesions  Neurological: Motor and sensory examination equal and normal in all four extremities.  Psychiatry: Appropriate mood and affect.    HOSPITAL MEDICATIONS:  MEDICATIONS  (STANDING):  albuterol/ipratropium for Nebulization 3 milliLiter(s) Nebulizer every 6 hours  benzonatate 100 milliGRAM(s) Oral every 8 hours  buDESOnide    Inhalation Suspension 0.5 milliGRAM(s) Inhalation two times a day  enoxaparin Injectable 40 milliGRAM(s) SubCutaneous daily  fluticasone propionate 50 MICROgram(s)/spray Nasal Spray 1 Spray(s) Both Nostrils two times a day  influenza   Vaccine 0.5 milliLiter(s) IntraMuscular once  montelukast 10 milliGRAM(s) Oral daily  pantoprazole    Tablet 40 milliGRAM(s) Oral before breakfast  predniSONE   Tablet 40 milliGRAM(s) Oral daily  sodium chloride 3%  Inhalation 5 milliLiter(s) Inhalation two times a day    MEDICATIONS  (PRN):  hydrocodone/homatropine Syrup 5 milliLiter(s) Oral at bedtime PRN Cough  ibuprofen  Tablet. 800 milliGRAM(s) Oral every 6 hours PRN Moderate Pain (4 - 6)      LABS:                        9.7    17.11 )-----------( 635      ( 08 Dec 2019 09:36 )             32.5     12-08    136  |  99  |  20  ----------------------------<  128<H>  3.7   |  24  |  0.66    Ca    8.1<L>      08 Dec 2019 07:09  Phos  3.9     12-08  Mg     2.2     12-08    TPro  7.4  /  Alb  4.3  /  TBili  0.3  /  DBili  x   /  AST  12  /  ALT  20  /  AlkPhos  59  12-08              MICROBIOLOGY:     RADIOLOGY:  [ ] Reviewed and interpreted by me    Point of Care Ultrasound Findings:    PFT:    EKG:

## 2019-12-09 NOTE — PROGRESS NOTE ADULT - SUBJECTIVE AND OBJECTIVE BOX
Patient is a 44y old  Female who presents with a chief complaint of Asthma Exacerbation (07 Dec 2019 15:55)      SUBJECTIVE / OVERNIGHT EVENTS:    Pt seen and examined at bedside,       MEDICATIONS  (STANDING):  albuterol/ipratropium for Nebulization 3 milliLiter(s) Nebulizer every 6 hours  benzonatate 100 milliGRAM(s) Oral every 8 hours  buDESOnide    Inhalation Suspension 0.5 milliGRAM(s) Inhalation two times a day  enoxaparin Injectable 40 milliGRAM(s) SubCutaneous daily  fluticasone propionate 50 MICROgram(s)/spray Nasal Spray 1 Spray(s) Both Nostrils two times a day  influenza   Vaccine 0.5 milliLiter(s) IntraMuscular once  methylPREDNISolone sodium succinate Injectable 40 milliGRAM(s) IV Push two times a day  montelukast 10 milliGRAM(s) Oral daily  pantoprazole    Tablet 40 milliGRAM(s) Oral before breakfast  sodium chloride 3%  Inhalation 5 milliLiter(s) Inhalation two times a day    MEDICATIONS  (PRN):  ibuprofen  Tablet. 600 milliGRAM(s) Oral every 8 hours PRN Moderate Pain (4 - 6)      Vital Signs Last 24 Hrs  T(C): 37.1 (08 Dec 2019 11:11), Max: 37.1 (08 Dec 2019 11:11)  T(F): 98.8 (08 Dec 2019 11:11), Max: 98.8 (08 Dec 2019 11:11)  HR: 92 (08 Dec 2019 11:11) (79 - 92)  BP: 152/92 (08 Dec 2019 11:11) (120/74 - 152/92)  BP(mean): --  RR: 18 (08 Dec 2019 11:11) (18 - 19)  SpO2: 99% (08 Dec 2019 11:11) (97% - 100%)  CAPILLARY BLOOD GLUCOSE        I&O's Summary    07 Dec 2019 07:01  -  08 Dec 2019 07:00  --------------------------------------------------------  IN: 300 mL / OUT: 0 mL / NET: 300 mL        PHYSICAL EXAM:  Vital Signs Last 24 Hrs  T(C): 37.1 (12-08-19 @ 11:11)  T(F): 98.8 (12-08-19 @ 11:11), Max: 98.8 (12-08-19 @ 11:11)  HR: 92 (12-08-19 @ 11:11) (79 - 92)  BP: 152/92 (12-08-19 @ 11:11)  BP(mean): --  RR: 18 (12-08-19 @ 11:11) (18 - 19)  SpO2: 99% (12-08-19 @ 11:11) (97% - 100%)  Wt(kg): --    12-07 @ 07:01  -  12-08 @ 07:00  --------------------------------------------------------  IN: 300 mL / OUT: 0 mL / NET: 300 mL      Constitutional: NAD, awake and alert  EYES: EOMI  ENT:  Normal Hearing, no tonsillar exudates   Neck: Soft and supple , no thyromegaly   Respiratory: Breath sounds are clear bilaterally, wheezing with deep inspiration, no rales or rhonchi  Cardiovascular: S1 and S2, regular rate and rhythm, no Murmurs, gallops or rubs, no JVD,    Gastrointestinal: Bowel Sounds present, soft, nontender, nondistended, no guarding, no rebound  Extremities: No cyanosis or clubbing; warm to touch  Vascular: 2+ peripheral pulses lower ex  Neurological: No focal deficits, CN II-XII intact bilaterally, sensation to light touch intact in all extremities, gait intact. Pupils are equally reactive to light and symmetrical in size.   Musculoskeletal: 5/5 strength b/l upper and lower extremities; no joint swelling.  Skin: No rashes  Psych: no depression or anhedonia, AAOx3  HEME: no bruises, no nose bleeds      LABS:                        9.7    17.11 )-----------( 635      ( 08 Dec 2019 09:36 )             32.5     12-08    136  |  99  |  20  ----------------------------<  128<H>  3.7   |  24  |  0.66    Ca    8.1<L>      08 Dec 2019 07:09  Phos  3.9     12-08  Mg     2.2     12-08    TPro  7.4  /  Alb  4.3  /  TBili  0.3  /  DBili  x   /  AST  12  /  ALT  20  /  AlkPhos  59  12-08              RADIOLOGY & ADDITIONAL TESTS:    Imaging Personally Reviewed:    Consultant(s) Notes Reviewed:      Care Discussed with Consultants/Other Providers: Patient is a 44y old  Female who presents with a chief complaint of Asthma Exacerbation (07 Dec 2019 15:55)      SUBJECTIVE / OVERNIGHT EVENTS:    Pt seen and examined sitting up in chair in no acute distress. Patient reports that yesterday she was feeling abdominal cramping related to her menstrual period and may have cause exacerbation of her wheezing and coughing, requiring an extra duoneb treatment and mag sulfate yesterday. Today, patient reports she is feeling improved and slept better last night, but is still with continued wheezing and dry cough. Peak flow is at 300, was at 350 when admitted. 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       MEDICATIONS  (STANDING):  albuterol/ipratropium for Nebulization 3 milliLiter(s) Nebulizer every 6 hours  benzonatate 100 milliGRAM(s) Oral every 8 hours  buDESOnide    Inhalation Suspension 0.5 milliGRAM(s) Inhalation two times a day  enoxaparin Injectable 40 milliGRAM(s) SubCutaneous daily  fluticasone propionate 50 MICROgram(s)/spray Nasal Spray 1 Spray(s) Both Nostrils two times a day  influenza   Vaccine 0.5 milliLiter(s) IntraMuscular once  methylPREDNISolone sodium succinate Injectable 40 milliGRAM(s) IV Push two times a day  montelukast 10 milliGRAM(s) Oral daily  pantoprazole    Tablet 40 milliGRAM(s) Oral before breakfast  sodium chloride 3%  Inhalation 5 milliLiter(s) Inhalation two times a day    MEDICATIONS  (PRN):  ibuprofen  Tablet. 600 milliGRAM(s) Oral every 8 hours PRN Moderate Pain (4 - 6)      Vital Signs Last 24 Hrs  T(C): 37.1 (08 Dec 2019 11:11), Max: 37.1 (08 Dec 2019 11:11)  T(F): 98.8 (08 Dec 2019 11:11), Max: 98.8 (08 Dec 2019 11:11)  HR: 92 (08 Dec 2019 11:11) (79 - 92)  BP: 152/92 (08 Dec 2019 11:11) (120/74 - 152/92)  BP(mean): --  RR: 18 (08 Dec 2019 11:11) (18 - 19)  SpO2: 99% (08 Dec 2019 11:11) (97% - 100%)  CAPILLARY BLOOD GLUCOSE        I&O's Summary    07 Dec 2019 07:01  -  08 Dec 2019 07:00  --------------------------------------------------------  IN: 300 mL / OUT: 0 mL / NET: 300 mL        PHYSICAL EXAM:  Vital Signs Last 24 Hrs  T(C): 37.1 (12-08-19 @ 11:11)  T(F): 98.8 (12-08-19 @ 11:11), Max: 98.8 (12-08-19 @ 11:11)  HR: 92 (12-08-19 @ 11:11) (79 - 92)  BP: 152/92 (12-08-19 @ 11:11)  BP(mean): --  RR: 18 (12-08-19 @ 11:11) (18 - 19)  SpO2: 99% (12-08-19 @ 11:11) (97% - 100%)  Wt(kg): --    12-07 @ 07:01  -  12-08 @ 07:00  --------------------------------------------------------  IN: 300 mL / OUT: 0 mL / NET: 300 mL      Constitutional: NAD, awake and alert  EYES: EOMI  ENT:  Normal Hearing, no tonsillar exudates   Neck: Soft and supple , no thyromegaly   Respiratory: Breath sounds are clear bilaterally, wheezing with deep inspiration, no rales or rhonchi  Cardiovascular: S1 and S2, regular rate and rhythm, no Murmurs, gallops or rubs, no JVD,    Gastrointestinal: Bowel Sounds present, soft, nontender, nondistended, no guarding, no rebound  Extremities: No cyanosis or clubbing; warm to touch  Vascular: 2+ peripheral pulses lower ex  Neurological: No focal deficits, CN II-XII intact bilaterally, sensation to light touch intact in all extremities, gait intact. Pupils are equally reactive to light and symmetrical in size.   Musculoskeletal: 5/5 strength b/l upper and lower extremities; no joint swelling.  Skin: No rashes  Psych: no depression or anhedonia, AAOx3  HEME: no bruises, no nose bleeds      LABS:                        9.7    17.11 )-----------( 635      ( 08 Dec 2019 09:36 )             32.5     12-08    136  |  99  |  20  ----------------------------<  128<H>  3.7   |  24  |  0.66    Ca    8.1<L>      08 Dec 2019 07:09  Phos  3.9     12-08  Mg     2.2     12-08    TPro  7.4  /  Alb  4.3  /  TBili  0.3  /  DBili  x   /  AST  12  /  ALT  20  /  AlkPhos  59  12-08              RADIOLOGY & ADDITIONAL TESTS:    Imaging Personally Reviewed:    Consultant(s) Notes Reviewed:      Care Discussed with Consultants/Other Providers: Patient is a 44y old  Female who presents with a chief complaint of Asthma Exacerbation (07 Dec 2019 15:55)      SUBJECTIVE / OVERNIGHT EVENTS:    Pt seen and examined sitting up in chair in no acute distress. Patient reports that yesterday she was feeling abdominal cramping related to her menstrual period and may have caused exacerbation of her wheezing and coughing, requiring an extra duoneb treatment and mag sulfate yesterday. Today, patient reports she is feeling improved and slept better last night, but is still with continued wheezing and dry cough. Peak flow is at 300, was at 350 when admitted. 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 Patient denies fever, chills, N/V/D or abdominal pain.       MEDICATIONS  (STANDING):  albuterol/ipratropium for Nebulization 3 milliLiter(s) Nebulizer every 6 hours  benzonatate 100 milliGRAM(s) Oral every 8 hours  buDESOnide    Inhalation Suspension 0.5 milliGRAM(s) Inhalation two times a day  enoxaparin Injectable 40 milliGRAM(s) SubCutaneous daily  fluticasone propionate 50 MICROgram(s)/spray Nasal Spray 1 Spray(s) Both Nostrils two times a day  influenza   Vaccine 0.5 milliLiter(s) IntraMuscular once  methylPREDNISolone sodium succinate Injectable 40 milliGRAM(s) IV Push two times a day  montelukast 10 milliGRAM(s) Oral daily  pantoprazole    Tablet 40 milliGRAM(s) Oral before breakfast  sodium chloride 3%  Inhalation 5 milliLiter(s) Inhalation two times a day    MEDICATIONS  (PRN):  ibuprofen  Tablet. 600 milliGRAM(s) Oral every 8 hours PRN Moderate Pain (4 - 6)      Vital Signs Last 24 Hrs  T(C): 37.1 (08 Dec 2019 11:11), Max: 37.1 (08 Dec 2019 11:11)  T(F): 98.8 (08 Dec 2019 11:11), Max: 98.8 (08 Dec 2019 11:11)  HR: 92 (08 Dec 2019 11:11) (79 - 92)  BP: 152/92 (08 Dec 2019 11:11) (120/74 - 152/92)  BP(mean): --  RR: 18 (08 Dec 2019 11:11) (18 - 19)  SpO2: 99% (08 Dec 2019 11:11) (97% - 100%)  CAPILLARY BLOOD GLUCOSE        I&O's Summary    07 Dec 2019 07:01  -  08 Dec 2019 07:00  --------------------------------------------------------  IN: 300 mL / OUT: 0 mL / NET: 300 mL        PHYSICAL EXAM:  Vital Signs Last 24 Hrs  T(C): 37.1 (12-08-19 @ 11:11)  T(F): 98.8 (12-08-19 @ 11:11), Max: 98.8 (12-08-19 @ 11:11)  HR: 92 (12-08-19 @ 11:11) (79 - 92)  BP: 152/92 (12-08-19 @ 11:11)  BP(mean): --  RR: 18 (12-08-19 @ 11:11) (18 - 19)  SpO2: 99% (12-08-19 @ 11:11) (97% - 100%)  Wt(kg): --    12-07 @ 07:01  -  12-08 @ 07:00  --------------------------------------------------------  IN: 300 mL / OUT: 0 mL / NET: 300 mL      Constitutional: NAD, awake and alert  EYES: EOMI  ENT:  Normal Hearing, no tonsillar exudates   Neck: Soft and supple , no thyromegaly   Respiratory: Breath sounds are clear bilaterally, wheezing with deep inspiration, no rales or rhonchi  Cardiovascular: S1 and S2, regular rate and rhythm, no Murmurs, gallops or rubs, no JVD,    Gastrointestinal: Bowel Sounds present, soft, nontender, nondistended, no guarding, no rebound  Extremities: No cyanosis or clubbing; warm to touch  Vascular: 2+ peripheral pulses lower ex  Neurological: No focal deficits, CN II-XII intact bilaterally, sensation to light touch intact in all extremities, gait intact. Pupils are equally reactive to light and symmetrical in size.   Musculoskeletal: 5/5 strength b/l upper and lower extremities; no joint swelling.  Skin: No rashes  Psych: no depression or anhedonia, AAOx3  HEME: no bruises, no nose bleeds      LABS:                        9.7    17.11 )-----------( 635      ( 08 Dec 2019 09:36 )             32.5     12-08    136  |  99  |  20  ----------------------------<  128<H>  3.7   |  24  |  0.66    Ca    8.1<L>      08 Dec 2019 07:09  Phos  3.9     12-08  Mg     2.2     12-08    TPro  7.4  /  Alb  4.3  /  TBili  0.3  /  DBili  x   /  AST  12  /  ALT  20  /  AlkPhos  59  12-08              RADIOLOGY & ADDITIONAL TESTS:    Imaging Personally Reviewed:    Consultant(s) Notes Reviewed:      Care Discussed with Consultants/Other Providers:

## 2019-12-09 NOTE — DISCHARGE NOTE PROVIDER - HOSPITAL COURSE
44F PMH of chronic severe asthma (no intubation, no prior ICU, last exacerbation was 2 years ago), iron deficiency anemia, TEODORA on CPAP presents with wheezing and shortness of breath, likely asthma exacerbation 2/2 URI. RVP pos for parainfluenza, placed on contact precautions. Pulmonary, Dr. Elizabeth following. Patient was put on IV Solumedrol 40 mg q12 and Duonebs q6hrs. Received 3 days of Doxycycline (12/5-12/8). Pt symptoms improved w/ one episode exacerbation 12/8 in setting of pain 2/2 menses refractory to duoneb and magnesium sulfate. Patient was transitioned to PO prednisone 50 mg and wheezing and dry cough improved. Patient was stable for discharge to follow up with pulmonologist, Dr Jesse Elizabeth. 44F PMH of chronic severe asthma (no intubation, no prior ICU, last exacerbation was 2 years ago), iron deficiency anemia, TEODORA on CPAP presents with wheezing and shortness of breath, likely asthma exacerbation 2/2 URI. RVP pos for parainfluenza, placed on contact precautions. Pulmonary, Dr. Elizabeth following. Patient was put on IV Solumedrol 40 mg q12 and Duonebs q6hrs. Received 3 days of Doxycycline (12/5-12/8). Pt symptoms improved w/ one episode exacerbation 12/8 in setting of pain 2/2 menses refractory to duoneb and magnesium sulfate. Patient was transitioned to PO prednisone 60 mg and wheezing and dry cough improved. Patient was stable for discharge to follow up with pulmonologist, Dr Jesse Elizabeth.

## 2019-12-10 ENCOUNTER — TRANSCRIPTION ENCOUNTER (OUTPATIENT)
Age: 44
End: 2019-12-10

## 2019-12-10 VITALS
HEART RATE: 99 BPM | SYSTOLIC BLOOD PRESSURE: 141 MMHG | OXYGEN SATURATION: 98 % | TEMPERATURE: 98 F | DIASTOLIC BLOOD PRESSURE: 87 MMHG | RESPIRATION RATE: 18 BRPM

## 2019-12-10 PROCEDURE — 82803 BLOOD GASES ANY COMBINATION: CPT

## 2019-12-10 PROCEDURE — 93005 ELECTROCARDIOGRAM TRACING: CPT

## 2019-12-10 PROCEDURE — 86850 RBC ANTIBODY SCREEN: CPT

## 2019-12-10 PROCEDURE — 81001 URINALYSIS AUTO W/SCOPE: CPT

## 2019-12-10 PROCEDURE — 82435 ASSAY OF BLOOD CHLORIDE: CPT

## 2019-12-10 PROCEDURE — 85014 HEMATOCRIT: CPT

## 2019-12-10 PROCEDURE — 81025 URINE PREGNANCY TEST: CPT

## 2019-12-10 PROCEDURE — 71046 X-RAY EXAM CHEST 2 VIEWS: CPT

## 2019-12-10 PROCEDURE — 82947 ASSAY GLUCOSE BLOOD QUANT: CPT

## 2019-12-10 PROCEDURE — 86901 BLOOD TYPING SEROLOGIC RH(D): CPT

## 2019-12-10 PROCEDURE — 83735 ASSAY OF MAGNESIUM: CPT

## 2019-12-10 PROCEDURE — 87633 RESP VIRUS 12-25 TARGETS: CPT

## 2019-12-10 PROCEDURE — 82330 ASSAY OF CALCIUM: CPT

## 2019-12-10 PROCEDURE — 84100 ASSAY OF PHOSPHORUS: CPT

## 2019-12-10 PROCEDURE — 87581 M.PNEUMON DNA AMP PROBE: CPT

## 2019-12-10 PROCEDURE — 84132 ASSAY OF SERUM POTASSIUM: CPT

## 2019-12-10 PROCEDURE — 83605 ASSAY OF LACTIC ACID: CPT

## 2019-12-10 PROCEDURE — 87798 DETECT AGENT NOS DNA AMP: CPT

## 2019-12-10 PROCEDURE — 86900 BLOOD TYPING SEROLOGIC ABO: CPT

## 2019-12-10 PROCEDURE — 99239 HOSP IP/OBS DSCHRG MGMT >30: CPT | Mod: GC

## 2019-12-10 PROCEDURE — 85027 COMPLETE CBC AUTOMATED: CPT

## 2019-12-10 PROCEDURE — 84295 ASSAY OF SERUM SODIUM: CPT

## 2019-12-10 PROCEDURE — 99285 EMERGENCY DEPT VISIT HI MDM: CPT | Mod: 25

## 2019-12-10 PROCEDURE — 94640 AIRWAY INHALATION TREATMENT: CPT

## 2019-12-10 PROCEDURE — 87486 CHLMYD PNEUM DNA AMP PROBE: CPT

## 2019-12-10 PROCEDURE — 80053 COMPREHEN METABOLIC PANEL: CPT

## 2019-12-10 PROCEDURE — 99232 SBSQ HOSP IP/OBS MODERATE 35: CPT

## 2019-12-10 RX ORDER — TIOTROPIUM BROMIDE 18 UG/1
1 CAPSULE ORAL; RESPIRATORY (INHALATION)
Qty: 0 | Refills: 0 | DISCHARGE

## 2019-12-10 RX ORDER — FLUTICASONE PROPIONATE 50 MCG
1 SPRAY, SUSPENSION NASAL
Qty: 1 | Refills: 0
Start: 2019-12-10

## 2019-12-10 RX ORDER — MONTELUKAST 4 MG/1
1 TABLET, CHEWABLE ORAL
Qty: 30 | Refills: 0
Start: 2019-12-10 | End: 2020-01-08

## 2019-12-10 RX ADMIN — Medication 100 MILLIGRAM(S): at 05:19

## 2019-12-10 RX ADMIN — Medication 3 MILLILITER(S): at 12:37

## 2019-12-10 RX ADMIN — Medication 0.5 MILLIGRAM(S): at 05:19

## 2019-12-10 RX ADMIN — Medication 1 SPRAY(S): at 05:21

## 2019-12-10 RX ADMIN — Medication 30 MILLILITER(S): at 00:32

## 2019-12-10 RX ADMIN — PANTOPRAZOLE SODIUM 40 MILLIGRAM(S): 20 TABLET, DELAYED RELEASE ORAL at 05:19

## 2019-12-10 RX ADMIN — Medication 3 MILLILITER(S): at 05:19

## 2019-12-10 RX ADMIN — SODIUM CHLORIDE 5 MILLILITER(S): 9 INJECTION INTRAMUSCULAR; INTRAVENOUS; SUBCUTANEOUS at 05:18

## 2019-12-10 RX ADMIN — Medication 3 MILLILITER(S): at 00:32

## 2019-12-10 RX ADMIN — Medication 60 MILLIGRAM(S): at 05:20

## 2019-12-10 RX ADMIN — Medication 800 MILLIGRAM(S): at 03:56

## 2019-12-10 RX ADMIN — MONTELUKAST 10 MILLIGRAM(S): 4 TABLET, CHEWABLE ORAL at 12:37

## 2019-12-10 RX ADMIN — Medication 100 MILLIGRAM(S): at 12:37

## 2019-12-10 NOTE — PROGRESS NOTE ADULT - PROBLEM SELECTOR PROBLEM 2
TEODORA on CPAP
denies pain/discomfort

## 2019-12-10 NOTE — PROGRESS NOTE ADULT - ASSESSMENT
44F PMH of chronic severe asthma (no intubation, no prior ICU, last exacerbation was 2 years ago), iron deficiency anemia, TEODORA on CPAP presents with wheezing and shortness of breath, likely asthma exacerbation 2/2 parainfluenza

## 2019-12-10 NOTE — PROGRESS NOTE ADULT - ATTENDING COMMENTS
Seen, examined the patient  Afebrile, overall breathing is better, sitting in bed, less wheezing and cough  - reviewed labs, imaging  - Dr Elizabeth- Pulmonary, plan noted    c/w bronchodilators, prednisone taper as directed, inhaled steroid for acute severe persistent asthma  - O2 sat 92-95% off O2  - d/c today, oupatient f/u with Dr Elizabeth in a week.    She will go back to work next week- Monday Monday 12/16 per Pulmonary  d/c time- 37 min
as above-  multifactorial dysnea--asthmatic bronchitis (parainfluenza), obesity, OOshape/OW--RA sat above 90%  asthma--boost pred to 50mg per day and no taper for 5 days (this is her hx), duoneb q 6, pulmicort .5 bid, tessalon perles/hycodan/singulair  osas--cpap o/n  Allergy-flonase bid, claritin     GI prophylaxis     parainfluenza-conservative care--no abx needed  DC planning to home    has appt 12/17  Rodrigue Elizabeth MD-Pulmonary   [phone]
as above-  multifactorial dysnea--asthmatic bronchitis (parainfluenza), obesity, OOshape/OW--RA sat above 90%  asthma--boosted pred to 60mg per day and no taper for 4 days (this is her hx), duoneb q 6, pulmicort .5 bid, tessalon perles/hycodan/singulair  osas--cpap o/n  Allergy-flonase bid, claritin     GI prophylaxis           ***DC hopeful today--appt next week w/ me (taper 60 through Friday then 50 mg until visit w/me)  parainfluenza-conservative care--no abx needed  DC planning to home today   has appt 12/17  Rodrigue Elizabeth MD-Pulmonary   [phone]
Seen, examined the patient.   Resting in bed, no acute SOB but still has course wheezing, cough and SOB, afebrile. Admitted for acute Asthma exacerbation, 2/2 URI  - reviewed labs, imaging. +ve RVP, CXR clear  - Pulmonary consult appreciated, c/w IV steroids- Solumedrol 40 mg Q 12hours, Bronchodilators,, Budesonide nebs bid, Flonase BID and nasal saline     Tessalon pearles Q8 hours  - Acapella device Q 4hours to aid in airway clearance, incentive spirometer, monitor peak flows and PPI
Patient seen and examined.   Asthma Exacerbation- Continue with iv Solumedrol for now. pulm following, slow taper steroids.
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

## 2019-12-10 NOTE — PROGRESS NOTE ADULT - PROBLEM SELECTOR PLAN 4
currently asymptomatic   protonix 40 mg qD

## 2019-12-10 NOTE — PROGRESS NOTE ADULT - SUBJECTIVE AND OBJECTIVE BOX
CHIEF COMPLAINT:  f/up sob, asthmatic bronchitis, parainfluenza, osas, obesity, allergic rhinitis--better today -more wheeze present--still cough    Interval Events: PO prednisone    REVIEW OF SYSTEMS:  Constitutional: No fevers or chills. No weight loss. + fatigue or generalized malaise.  Eyes: No itching or discharge from the eyes  ENT: No ear pain. No ear discharge. No nasal congestion. No post nasal drip. No epistaxis. No throat pain. No sore throat. No difficulty swallowing.   CV: No chest pain. No palpitations. No lightheadedness or dizziness.   Resp: No dyspnea at rest. + dyspnea on exertion. No orthopnea. + wheezing. No cough. No stridor. No sputum production. No chest pain with respiration.  GI: No nausea. No vomiting. No diarrhea.  MSK: No joint pain or pain in any extremities  Integumentary: No skin lesions. No pedal edema.  Neurological: No gross motor weakness. No sensory changes.  [+ ] All other systems negative  [ ] Unable to assess ROS because ________    OBJECTIVE:  ICU Vital Signs Last 24 Hrs  T(C): 36.8 (10 Dec 2019 00:19), Max: 37.1 (09 Dec 2019 21:24)  T(F): 98.2 (10 Dec 2019 00:19), Max: 98.8 (09 Dec 2019 21:24)  HR: 73 (10 Dec 2019 00:19) (73 - 93)  BP: 116/57 (10 Dec 2019 00:19) (116/57 - 137/83)  BP(mean): --  ABP: --  ABP(mean): --  RR: 18 (10 Dec 2019 00:19) (18 - 20)  SpO2: 96% (10 Dec 2019 00:19) (95% - 99%)        CAPILLARY BLOOD GLUCOSE          PHYSICAL EXAM: NAD in bed  General: Awake, alert, oriented X 3.   HEENT: Atraumatic, normocephalic.                 Mallampatti Grade 3                No nasal congestion.                No tonsillar or pharyngeal exudates.  Lymph Nodes: No palpable lymphadenopathy  Neck: No JVD. No carotid bruit.   Respiratory: Normal chest expansion                         Normal percussion                         Normal and equal air entry                         + wheeze,no  rhonchi or rales.  Cardiovascular: S1 S2 normal. No murmurs, rubs or gallops.   Abdomen: Soft, non-tender, non-distended. No organomegaly. Normoactive bowel sounds.  Extremities: Warm to touch. Peripheral pulse palpable. No pedal edema.   Skin: No rashes or skin lesions  Neurological: Motor and sensory examination equal and normal in all four extremities.  Psychiatry: Appropriate mood and affect.    HOSPITAL MEDICATIONS:  MEDICATIONS  (STANDING):  albuterol/ipratropium for Nebulization 3 milliLiter(s) Nebulizer every 6 hours  benzonatate 100 milliGRAM(s) Oral every 8 hours  buDESOnide    Inhalation Suspension 0.5 milliGRAM(s) Inhalation two times a day  enoxaparin Injectable 40 milliGRAM(s) SubCutaneous daily  fluticasone propionate 50 MICROgram(s)/spray Nasal Spray 1 Spray(s) Both Nostrils two times a day  influenza   Vaccine 0.5 milliLiter(s) IntraMuscular once  montelukast 10 milliGRAM(s) Oral daily  pantoprazole    Tablet 40 milliGRAM(s) Oral before breakfast  predniSONE   Tablet 60 milliGRAM(s) Oral daily  sodium chloride 3%  Inhalation 5 milliLiter(s) Inhalation two times a day    MEDICATIONS  (PRN):  aluminum hydroxide/magnesium hydroxide/simethicone Suspension 30 milliLiter(s) Oral every 4 hours PRN Dyspepsia  hydrocodone/homatropine Syrup 5 milliLiter(s) Oral at bedtime PRN Cough  ibuprofen  Tablet. 800 milliGRAM(s) Oral every 6 hours PRN Moderate Pain (4 - 6)      LABS:                        9.7    17.11 )-----------( 635      ( 08 Dec 2019 09:36 )             32.5     12-08    136  |  99  |  20  ----------------------------<  128<H>  3.7   |  24  |  0.66    Ca    8.1<L>      08 Dec 2019 07:09  Phos  3.9     12-08  Mg     2.2     12-08    TPro  7.4  /  Alb  4.3  /  TBili  0.3  /  DBili  x   /  AST  12  /  ALT  20  /  AlkPhos  59  12-08              MICROBIOLOGY:     RADIOLOGY:  [ ] Reviewed and interpreted by me    Point of Care Ultrasound Findings:    PFT:    EKG:

## 2019-12-10 NOTE — PROGRESS NOTE ADULT - SUBJECTIVE AND OBJECTIVE BOX
Contact Info:  Wagner Brand M.D., PGY-2  Pager: ns- [phone], ZOJ- [phone]      Chief Complaint: Patient is a 44y old  Female who presents with a chief complaint of Asthma Exacerbation (10 Dec 2019 05:45)        INTERVAL HPI/OVERNIGHT EVENTS:   No acute overnight event. Patient reports feeling_____. Denied fever, chill, nausea, vomiting, headache, CP, SOB, abdominal pain, diarrhea, or constipation.       MEDICATIONS  (STANDING):  albuterol/ipratropium for Nebulization 3 milliLiter(s) Nebulizer every 6 hours  benzonatate 100 milliGRAM(s) Oral every 8 hours  buDESOnide    Inhalation Suspension 0.5 milliGRAM(s) Inhalation two times a day  enoxaparin Injectable 40 milliGRAM(s) SubCutaneous daily  fluticasone propionate 50 MICROgram(s)/spray Nasal Spray 1 Spray(s) Both Nostrils two times a day  influenza   Vaccine 0.5 milliLiter(s) IntraMuscular once  montelukast 10 milliGRAM(s) Oral daily  pantoprazole    Tablet 40 milliGRAM(s) Oral before breakfast  predniSONE   Tablet 60 milliGRAM(s) Oral daily  sodium chloride 3%  Inhalation 5 milliLiter(s) Inhalation two times a day    MEDICATIONS  (PRN):  aluminum hydroxide/magnesium hydroxide/simethicone Suspension 30 milliLiter(s) Oral every 4 hours PRN Dyspepsia  hydrocodone/homatropine Syrup 5 milliLiter(s) Oral at bedtime PRN Cough  ibuprofen  Tablet. 800 milliGRAM(s) Oral every 6 hours PRN Moderate Pain (4 - 6)      Vital Signs Last 24 Hrs  T(C): 36.8 (10 Dec 2019 00:19), Max: 37.1 (09 Dec 2019 21:24)  T(F): 98.2 (10 Dec 2019 00:19), Max: 98.8 (09 Dec 2019 21:24)  HR: 84 (10 Dec 2019 06:42) (73 - 93)  BP: 116/57 (10 Dec 2019 00:19) (116/57 - 137/83)  BP(mean): --  RR: 18 (10 Dec 2019 00:19) (18 - 20)  SpO2: 98% (10 Dec 2019 06:42) (95% - 99%)  Supplemental O2: [ ] No, on Room Air [ ] Yes,     I&O's Detail    CAPILLARY BLOOD GLUCOSE          PHYSICAL EXAM:  Daily     Daily    Appearance: NAD, well-developed	  HEENT:   Normal oral mucosa, PERRL, EOMI	  Neck: No JVD, no LAD, supple, trachea in midline  Cardiovascular: Normal S1 S2, No JVD, No murmurs  Respiratory: Lungs clear to auscultation, no wheezing, rhonchi  Gastrointestinal:  Soft, Non-tender, nondistended, + BS  Skin: No rashes, No ecchymoses, No cyanosis	  MSK/Extremities: Normal range of motion, 5/5 Muscle strength bilaterally. No clubbing, cyanosis or edema  Vascular: Peripheral pulses palpable 2+ bilaterally  Neurologic: Non-focal, CN II-XII intact  Psychiatry: A & O x 3, Mood & affect appropriate    LABS:                        9.7    17.11 )-----------( 635      ( 08 Dec 2019 09:36 )             32.5           LIVER FUNCTIONS - ( 08 Dec 2019 07:09 )  Alb: 4.3 g/dL / Pro: 7.4 g/dL / ALK PHOS: 59 U/L / ALT: 20 U/L / AST: 12 U/L / GGT: x     / T. Bili 0.3 mg/dL / D. Bili x                     Microbiology:    RADIOLOGY & ADDITIONAL TESTS:    Xray -   CT -  MRI -     Imaging Personally Reviewed:  [ ] YES  [ ] NO  Consultant(s) Notes Reviewed:  [X] YES  [ ] NO  Care Discussed with Consultants/Other Providers [ ] YES  [ ] NO Contact Info:  Wagner Brand M.D., PGY-2  Pager: ns- [phone], lij- [phone]      Chief Complaint: Patient is a 44y old  Female who presents with a chief complaint of Asthma Exacerbation (10 Dec 2019 05:45)        INTERVAL HPI/OVERNIGHT EVENTS:   No acute overnight event. Patient reports feeling better. No other complaint      MEDICATIONS  (STANDING):  albuterol/ipratropium for Nebulization 3 milliLiter(s) Nebulizer every 6 hours  benzonatate 100 milliGRAM(s) Oral every 8 hours  buDESOnide    Inhalation Suspension 0.5 milliGRAM(s) Inhalation two times a day  enoxaparin Injectable 40 milliGRAM(s) SubCutaneous daily  fluticasone propionate 50 MICROgram(s)/spray Nasal Spray 1 Spray(s) Both Nostrils two times a day  influenza   Vaccine 0.5 milliLiter(s) IntraMuscular once  montelukast 10 milliGRAM(s) Oral daily  pantoprazole    Tablet 40 milliGRAM(s) Oral before breakfast  predniSONE   Tablet 60 milliGRAM(s) Oral daily  sodium chloride 3%  Inhalation 5 milliLiter(s) Inhalation two times a day    MEDICATIONS  (PRN):  aluminum hydroxide/magnesium hydroxide/simethicone Suspension 30 milliLiter(s) Oral every 4 hours PRN Dyspepsia  hydrocodone/homatropine Syrup 5 milliLiter(s) Oral at bedtime PRN Cough  ibuprofen  Tablet. 800 milliGRAM(s) Oral every 6 hours PRN Moderate Pain (4 - 6)      Vital Signs Last 24 Hrs  T(C): 36.8 (10 Dec 2019 00:19), Max: 37.1 (09 Dec 2019 21:24)  T(F): 98.2 (10 Dec 2019 00:19), Max: 98.8 (09 Dec 2019 21:24)  HR: 84 (10 Dec 2019 06:42) (73 - 93)  BP: 116/57 (10 Dec 2019 00:19) (116/57 - 137/83)  BP(mean): --  RR: 18 (10 Dec 2019 00:19) (18 - 20)  SpO2: 98% (10 Dec 2019 06:42) (95% - 99%)  Supplemental O2: [ ] No, on Room Air [ ] Yes,     I&O's Detail    CAPILLARY BLOOD GLUCOSE          PHYSICAL EXAM:  Daily     Daily    Appearance: NAD, well-developed	  HEENT:   Normal oral mucosa, PERRL, EOMI	  Neck: No JVD, no LAD, supple, trachea in midline  Cardiovascular: Normal S1 S2, No JVD, No murmurs  Respiratory: Lungs clear to auscultation, mild expiratory wheezes  Gastrointestinal:  Soft, Non-tender, nondistended, + BS  Skin: No rashes, No ecchymoses, No cyanosis	  MSK/Extremities: Normal range of motion, 5/5 Muscle strength bilaterally. No clubbing, cyanosis or edema  Vascular: Peripheral pulses palpable 2+ bilaterally  Neurologic: Non-focal, CN II-XII intact  Psychiatry: A & O x 3, Mood & affect appropriate    LABS:                        9.7    17.11 )-----------( 635      ( 08 Dec 2019 09:36 )             32.5           LIVER FUNCTIONS - ( 08 Dec 2019 07:09 )  Alb: 4.3 g/dL / Pro: 7.4 g/dL / ALK PHOS: 59 U/L / ALT: 20 U/L / AST: 12 U/L / GGT: x     / T. Bili 0.3 mg/dL / D. Bili x                     Microbiology:    RADIOLOGY & ADDITIONAL TESTS:    Xray -   CT -  MRI -     Imaging Personally Reviewed:  [ ] YES  [ ] NO  Consultant(s) Notes Reviewed:  [X] YES  [ ] NO  Care Discussed with Consultants/Other Providers [ ] YES  [ ] NO

## 2019-12-10 NOTE — DISCHARGE NOTE NURSING/CASE MANAGEMENT/SOCIAL WORK - PATIENT PORTAL LINK FT
You can access the FollowMyHealth Patient Portal offered by [facility] by registering at the following website: [email or web address]. By joining [facility]’s FollowMyHealth portal, you will also be able to view your health information using other applications (apps) compatible with our system.

## 2019-12-10 NOTE — PROGRESS NOTE ADULT - PROBLEM SELECTOR PLAN 3
Hb 9.7, baseline 9-10  Iron studies  No syncope, active bleeding, indication for transfusion at this time   will maintain active Type and screen

## 2019-12-10 NOTE — PROGRESS NOTE ADULT - PROBLEM SELECTOR PLAN 6
Problem: Discharge planning issues. Plan; Transitions of Care Status:  1. Name of PCP: Dr. Elizabeth (pulmonologist)  2. PCP contacted on Admission: [X]Y []N  3. PCP contacted at Discharge: []Y []N  4. Post-Discharge Appointment Date and Location:   5. Summary of Handoff given to PCP:
Problem: Discharge planning issues. Plan; Transitions of Care Status:  1. Name of PCP: Dr. Smith (pulmonologist)  2. PCP contacted on Admission: [X]Y []N  3. PCP contacted at Discharge: [Y]Y []N  4. Post-Discharge Appointment Date and Location:  12/17/2019 with Dr. smith  5. Summary of Handoff given to PCP: Y

## 2019-12-10 NOTE — PROGRESS NOTE ADULT - ASSESSMENT
43 yo F lifelong nonsmoker, with a h/o moderate persistent asthma. GERD, obesity presents with 2 weeks of persistent wheezing and nonproductive cough. Developed URI symptoms of nasal congestion, nonproductive cough, progressed to wheezing. Seen in Dr. Elizabeth's office, started on Prednisone 20 mg , attempted taper to 10mg with worsening symptoms.  Pt was seen in [facility] ED last week, given IV steroids and was sent home to follow up with pulm.  Pt states on 11/29 was started on Doxy, nebulized budesonide, Duonebs, Spiriva, Qvar, Benzonatate, Nexium and continued on prednisone- pt notes had persistent sxs despite meds. Increased back to 40 mg 2 days ago and started on which she began on 12/3 without relief.  Pt notes sxs began 2 weeks ago when she developed URI sxs- dry nonproductive cough, sore throat, post nasal drip. Pt notes 2 episodes of post-tussive emesis over the weekend otherwise denies n/v.    A/P: Asthma exacerbation, 2/2 parainfluenza- remains bronchospastic, but improving. Not hypoxic  NO evidence for superimposed pneumonia   c/w IV steroids-  Prednisone 40 mg day--would boost to at least 50mg per day  Bronchodilators - Duonebs Q6 hours standing, Budesonide nebs bid   Flonase BID and nasal saline   Tessalon perles Q8 hours, continues to complain of significant nocturnal symptoms - c/w Hycodan QHS   Acapella device Q 4hours to aid in airway clearance, incentive spirometer, monitor peak flows 3x a day  GERD- H2 blocker  DVT ppx===== able, would ambulate patient and monitor symptoms (currently on contact/droplet precautions - nurse to speak with infection control)  ****************  12/9-on po pred (very bronchospastic)-needs higher dose pred-no less than 50 for 5 days  12/10-slightly better-PO prednisone 60mg per day

## 2019-12-10 NOTE — PROGRESS NOTE ADULT - PROBLEM SELECTOR PROBLEM 4
H/O gastroesophageal reflux (GERD)

## 2019-12-10 NOTE — PROGRESS NOTE ADULT - PROBLEM SELECTOR PROBLEM 3
Anemia, iron deficiency

## 2019-12-10 NOTE — PROGRESS NOTE ADULT - PROBLEM SELECTOR PLAN 1
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 Elizabeth following  - C/w prednisone 60 mg qD  - Duonebs q6hrs   - Budesonide nebs BID  - Flonase BID, nasal saline  - Tessalon Perles q8hrs, hycodan qhs  - Acapella device  - monitor peak flow tid, incentive spirometer

## 2019-12-10 NOTE — PROGRESS NOTE ADULT - REASON FOR ADMISSION
Asthma Exacerbation

## 2019-12-10 NOTE — PROGRESS NOTE ADULT - PROBLEM SELECTOR PROBLEM 1
Asthma exacerbation, non-allergic, unspecified asthma severity

## 2019-12-17 ENCOUNTER — APPOINTMENT (OUTPATIENT)
Dept: PULMONOLOGY | Facility: CLINIC | Age: 44
End: 2019-12-17
Payer: COMMERCIAL

## 2019-12-17 VITALS
HEART RATE: 116 BPM | DIASTOLIC BLOOD PRESSURE: 84 MMHG | RESPIRATION RATE: 17 BRPM | BODY MASS INDEX: 42.34 KG/M2 | WEIGHT: 248 LBS | OXYGEN SATURATION: 98 % | SYSTOLIC BLOOD PRESSURE: 133 MMHG | HEIGHT: 64 IN

## 2019-12-17 DIAGNOSIS — Z87.09 PERSONAL HISTORY OF OTHER DISEASES OF THE RESPIRATORY SYSTEM: ICD-10-CM

## 2019-12-17 PROCEDURE — 99214 OFFICE O/P EST MOD 30 MIN: CPT | Mod: 25

## 2019-12-17 RX ORDER — DOXYCYCLINE 100 MG/1
100 CAPSULE ORAL
Qty: 20 | Refills: 0 | Status: DISCONTINUED | COMMUNITY
Start: 2019-12-03 | End: 2019-12-17

## 2019-12-17 NOTE — ADDENDUM
[FreeTextEntry1] : Documented by Angel Dubois acting as a scribe for Dr. Rodrigue Elizabeth on 12/17/2019.\par \par All medical record entries made by the Scribe were at my, Dr. Rodrigue Elizabeth's, direction and personally dictated by me on 12/17/2019. I have reviewed the chart and agree that the record accurately reflects my personal performance of the history, physical exam, assessment and plan. I have also personally directed, reviewed, and agree with the discharge instructions.

## 2019-12-17 NOTE — HISTORY OF PRESENT ILLNESS
[FreeTextEntry1] : Ms. Edwards is a 44 year old female with a history of allergic rhinitis, severe persistent asthma, GERD, eosinophilia, nocturia, sleep apnea, SOB and vitamin D deficiency presenting to the office today for a follow up visit. Her chief complaint is recent hospitalization\par -she was recently hospitalized with parainfluenza, bronchospasm, and had been in an isolation room\par -she notes her breathing has improved, both her breathing and wheezing\par -she notes she is no longer coughing\par -she reports she is taking all recommended medications\par -she states she has a sour taste in her mouth from the steroids\par -she reports she isn't sleeping well, and sleeps whenever she can\par -she notes she returned to sleep yesterday\par -she reports she is currently on 40 mg Prednisone\par -she denies any chest pain, chest pressure, diarrhea, constipation, dysphagia, dizziness, sour taste in the mouth, heartburn, reflux

## 2019-12-17 NOTE — REVIEW OF SYSTEMS
[Negative] : Sleep Disorder [As Noted in HPI] : as noted in HPI [Cough] : no cough [Wheezing] : no wheezing [Chest Discomfort] : no chest discomfort

## 2019-12-17 NOTE — PHYSICAL EXAM
[General Appearance - Well Developed] : well developed [Normal Appearance] : normal appearance [Well Groomed] : well groomed [General Appearance - Well Nourished] : well nourished [General Appearance - In No Acute Distress] : no acute distress [No Deformities] : no deformities [Normal Conjunctiva] : the conjunctiva exhibited no abnormalities [Eyelids - No Xanthelasma] : the eyelids demonstrated no xanthelasmas [Normal Oropharynx] : normal oropharynx [III] : III [Neck Appearance] : the appearance of the neck was normal [Neck Cervical Mass (___cm)] : no neck mass was observed [Thyroid Diffuse Enlargement] : the thyroid was not enlarged [Jugular Venous Distention Increased] : there was no jugular-venous distention [Thyroid Nodule] : there were no palpable thyroid nodules [Heart Rate And Rhythm] : heart rate and rhythm were normal [Heart Sounds] : normal S1 and S2 [Murmurs] : no murmurs present [Respiration, Rhythm And Depth] : normal respiratory rhythm and effort [Exaggerated Use Of Accessory Muscles For Inspiration] : no accessory muscle use [Abdomen Soft] : soft [Abdomen Tenderness] : non-tender [Abdomen Mass (___ Cm)] : no abdominal mass palpated [Abnormal Walk] : normal gait [Gait - Sufficient For Exercise Testing] : the gait was sufficient for exercise testing [Nail Clubbing] : no clubbing of the fingernails [Cyanosis, Localized] : no localized cyanosis [Petechial Hemorrhages (___cm)] : no petechial hemorrhages [Skin Color & Pigmentation] : normal skin color and pigmentation [] : no rash [No Venous Stasis] : no venous stasis [Skin Lesions] : no skin lesions [No Skin Ulcers] : no skin ulcer [No Xanthoma] : no  xanthoma was observed [Sensation] : the sensory exam was normal to light touch and pinprick [Deep Tendon Reflexes (DTR)] : deep tendon reflexes were 2+ and symmetric [No Focal Deficits] : no focal deficits [Impaired Insight] : insight and judgment were intact [Oriented To Time, Place, And Person] : oriented to person, place, and time [Affect] : the affect was normal [FreeTextEntry1] : I:E ratio 1:3; rare expiratory wheeze

## 2019-12-17 NOTE — ASSESSMENT
[FreeTextEntry1] : Ms. Edwards is a 44 year old female with a history of asthma, allergies, GERD, OSAS, hypothyroidism, arthritis, endometriosis, and obesity. She presents to the office for pulmonary s/p parainfluenza induced asthma.\par \par Her SOB is due to: \par -anemia\par -asthma\par -obesity\par -poor mechanics of breathing \par -out of shape over weight\par \par Problem 1A: Parainfluenza / cough\par -s/p isolation\par -recommended supportive care\par -Add Amitriptyline 10 mg Q8H for her cough\par \par Problem 1: Severe persistent asthma\par -Add a course of Prednisone; 40 mg for 3 days, 30 mg for 3 days, then 20 mg for 3 days, then 10 mg for 3 days \par Information sheet given to the patient to be reviewed, this medication is never to be used without consulting the prescribing physician. Proper dietary restraint is necessary specifically salt containing foods, if any reaction may occur should be reported. \par \par -continue Symbicort 160 (2 puffs BID) \par -continue Qvar 80 (2 puffs BID) \par -continue Singulair 10 mg at bedtime \par - Continue DuoNeb via Hand held nebulizer prn, Q6H\par \par Asthma is believed to be caused by inherited (genetic) and environmental factor, but its exact cause is unknown. Asthma may be triggered by allergens, lung infections, or irritants in the air. Asthma triggers are different for each person \par -Inhaler technique reviewed as well as oral hygiene techniques reviewed with patient. Avoidance of cold air, extremes of temperature, rescue inhaler should be used before exercise. Order of medication reviewed with patient. Recommended use of a cool mist humidifier in the bedroom \par \par problem 2: overweight\par Weight loss, exercise, and diet control were discussed and are highly encouraged. Treatment options were given such as, aqua therapy, and contacting a nutritionist. Recommended to use the elliptical, stationary bike, less use of treadmill. Mindful eating was explained to the patient Obesity is associated with worsening asthma, shortness of breath, and potential for cardiac disease, diabetes, and other underlying medical conditions. \par \par problem 3: poor breathing mechanics\par -Proper breathing techniques were reviewed with an emphasis of exhalation. Patient instructed to breath in for 1 second and out for four seconds. Patient was encouraged to not talk while walking.\par \par Problem 4: Anemia - s/p gynecological embolization\par -Hematin Anemia guard QD\par \par Problem 5: allergies/sinus\par -continue Qnasl 1 sniff BID \par -recommended to use "Navage" nasal rinse device \par -continue Astelin 0.15 1 sniff BID\par \par Environmental measures for allergies were encouraged including mattress and pillow cover, air purifier, and environmental controls.\par \par Problem 6:  GERD\par -continue Nexium 40 mg QD at breakfast \par -Rule of 2s: avoid eating too much, eating too late, eating too spicy, eating two hours before bed\par -Things to avoid including overeating, spicy foods, tight clothing, eating within three hours of bed, this list is not all inclusive. \par -For treatment of reflux, possible options discussed including diet control, H2 blockers, PPIs, as well as coating motility agents discussed as treatment options. Timing of meals and proximity of last meal to sleep were discussed. If symptoms persist, a formal gastrointestinal evaluation is needed. \par \par Problem 7: low vitamin D\par -Continue on supplemental vitamin D \par \par Problem 8: OSAS\par -continue to use CPAP every night, benefiting well \par - -Discussed the risks/associations with coronary artery disease, atrial fibrillation, arrhythmia, memory loss, issues with concentration, stroke risk, hypertension, nocturia, chronic reflux/Ferrara’s esophagus some but not all inclusive. Treatment options discussed including CPAP/BiPAP machine, oral appliance, ProVent therapy, Oxy-Aid by Respitec, new technologies, or positional sleep.\par \par problem 9: Pulmonary Pre-Op clearance for GYN surgery\par -at this point in time there are no absolute pulmonary contraindications to go forward with the planned procedure  \par -at the time of surgery s/he should have optimal pain control, incentive spirometry, early ambulation, DVT and GI prophylaxis. \par \par problem 10: elevated eosinophil level (not elevated) \par -candidate for Nucala or Faserna\par \par -The safety and efficacy of Nucala was established in three double-blind, randomized, placebo-controlled trials in patients with severe asthma. Compared to a placebo, patients with severe asthma receiving Nucala had fewer exacerbation requiring hospitalization and/or emergency department visits, and a longer time to first exacerbation. In addition, patients with severe asthma receiving Nucala experienced greater reductions in their daily maintenance oral corticosteroid dose, while maintaining asthma control compared with patients receiving placebo. Treatment with Nucala did not result in a significant improvement in lung function, as measured by the volume of air exhaled by patients in one second. The most common side effects include: headache, injection site reactions, back pain, weakness, and fatigue; hypersensitivity reactions can occur within hours or days including swelling of the face, mouth, and tongue, fainting, dizziness, hives, breathing problems, and rash; herpes zoster infections have occurred. The drug is a monoclonal antibody that inhibits interleukin-5 which helps regular eosinophils, a type of white blood cell that contributes to asthma. The over-production of eosinophils can cause inflammation in the lungs, increasing the frequency of asthma attacks. Patients must also take other medications, including high dose inhaled corticosteroids and at least one additional asthma drug.\par \par problem 11: elevated IgE level\par -failed Xolair\par \par problem 12: health maintenance \par -recommended yearly flu shot after October 15 - 2018\par -recommended strep pneumonia vaccines: Prevnar-13 vaccine, followed by Pneumo vaccine 23 one year following\par -recommended early intervention for URIs\par -recommended regular osteoporosis evaluations\par -recommended early dermatological evaluations\par -recommended after the age of 50 to the age of 70, colonoscopy every 5 years \par Recommended that she see a Psychologist to help her with her get back to "herself" \par \par F/u in 3-4 months - SPI (FeNO not covered, do not do.)\par pt is encouraged to call or fax the office with any questions or concerns. \par Pt is to exercise and diet to promote a healthy weight. \par Explained to the pt in full detail with demonstrations how to use the inhalers and inhaler hygiene.

## 2020-01-15 NOTE — PROGRESS NOTE ADULT - PROBLEM SELECTOR PLAN 2
Left message on patient's voicemail with normal results. Asked that patient call office back with any questions or concerns.
CPAP at night, will resume after asthma exacerbation.

## 2020-01-31 ENCOUNTER — EMERGENCY (EMERGENCY)
Facility: HOSPITAL | Age: 45
LOS: 1 days | Discharge: HOME | End: 2020-01-31
Attending: STUDENT IN AN ORGANIZED HEALTH CARE EDUCATION/TRAINING PROGRAM
Payer: COMMERCIAL

## 2020-01-31 VITALS
DIASTOLIC BLOOD PRESSURE: 88 MMHG | OXYGEN SATURATION: 98 % | TEMPERATURE: 98 F | RESPIRATION RATE: 18 BRPM | HEART RATE: 80 BPM | SYSTOLIC BLOOD PRESSURE: 134 MMHG | HEIGHT: 64 IN | WEIGHT: 250 LBS

## 2020-01-31 DIAGNOSIS — Z98.890 OTHER SPECIFIED POSTPROCEDURAL STATES: Chronic | ICD-10-CM

## 2020-01-31 LAB
ALBUMIN SERPL ELPH-MCNC: 4.3 G/DL — SIGNIFICANT CHANGE UP (ref 3.3–5)
ALP SERPL-CCNC: 54 U/L — SIGNIFICANT CHANGE UP (ref 40–120)
ALT FLD-CCNC: 22 U/L — SIGNIFICANT CHANGE UP (ref 10–45)
ANION GAP SERPL CALC-SCNC: 16 MMOL/L — SIGNIFICANT CHANGE UP (ref 5–17)
AST SERPL-CCNC: 13 U/L — SIGNIFICANT CHANGE UP (ref 10–40)
BASOPHILS # BLD AUTO: 0.02 K/UL — SIGNIFICANT CHANGE UP (ref 0–0.2)
BASOPHILS NFR BLD AUTO: 0.3 % — SIGNIFICANT CHANGE UP (ref 0–2)
BILIRUB SERPL-MCNC: 0.2 MG/DL — SIGNIFICANT CHANGE UP (ref 0.2–1.2)
BUN SERPL-MCNC: 10 MG/DL — SIGNIFICANT CHANGE UP (ref 7–23)
CALCIUM SERPL-MCNC: 8.8 MG/DL — SIGNIFICANT CHANGE UP (ref 8.4–10.5)
CHLORIDE SERPL-SCNC: 100 MMOL/L — SIGNIFICANT CHANGE UP (ref 96–108)
CO2 SERPL-SCNC: 22 MMOL/L — SIGNIFICANT CHANGE UP (ref 22–31)
CREAT SERPL-MCNC: 0.62 MG/DL — SIGNIFICANT CHANGE UP (ref 0.5–1.3)
EOSINOPHIL # BLD AUTO: 0.01 K/UL — SIGNIFICANT CHANGE UP (ref 0–0.5)
EOSINOPHIL NFR BLD AUTO: 0.1 % — SIGNIFICANT CHANGE UP (ref 0–6)
GLUCOSE SERPL-MCNC: 181 MG/DL — HIGH (ref 70–99)
HCG SERPL-ACNC: <2 MIU/ML — SIGNIFICANT CHANGE UP
HCT VFR BLD CALC: 33.5 % — LOW (ref 34.5–45)
HGB BLD-MCNC: 10.1 G/DL — LOW (ref 11.5–15.5)
IMM GRANULOCYTES NFR BLD AUTO: 0.7 % — SIGNIFICANT CHANGE UP (ref 0–1.5)
LYMPHOCYTES # BLD AUTO: 1.7 K/UL — SIGNIFICANT CHANGE UP (ref 1–3.3)
LYMPHOCYTES # BLD AUTO: 23.5 % — SIGNIFICANT CHANGE UP (ref 13–44)
MCHC RBC-ENTMCNC: 23.2 PG — LOW (ref 27–34)
MCHC RBC-ENTMCNC: 30.1 GM/DL — LOW (ref 32–36)
MCV RBC AUTO: 76.8 FL — LOW (ref 80–100)
MONOCYTES # BLD AUTO: 0.23 K/UL — SIGNIFICANT CHANGE UP (ref 0–0.9)
MONOCYTES NFR BLD AUTO: 3.2 % — SIGNIFICANT CHANGE UP (ref 2–14)
NEUTROPHILS # BLD AUTO: 5.21 K/UL — SIGNIFICANT CHANGE UP (ref 1.8–7.4)
NEUTROPHILS NFR BLD AUTO: 72.2 % — SIGNIFICANT CHANGE UP (ref 43–77)
NRBC # BLD: 0 /100 WBCS — SIGNIFICANT CHANGE UP (ref 0–0)
PLATELET # BLD AUTO: 458 K/UL — HIGH (ref 150–400)
POTASSIUM SERPL-MCNC: 3.5 MMOL/L — SIGNIFICANT CHANGE UP (ref 3.5–5.3)
POTASSIUM SERPL-SCNC: 3.5 MMOL/L — SIGNIFICANT CHANGE UP (ref 3.5–5.3)
PROT SERPL-MCNC: 7.2 G/DL — SIGNIFICANT CHANGE UP (ref 6–8.3)
RAPID RVP RESULT: SIGNIFICANT CHANGE UP
RBC # BLD: 4.36 M/UL — SIGNIFICANT CHANGE UP (ref 3.8–5.2)
RBC # FLD: 16.8 % — HIGH (ref 10.3–14.5)
SODIUM SERPL-SCNC: 138 MMOL/L — SIGNIFICANT CHANGE UP (ref 135–145)
WBC # BLD: 7.22 K/UL — SIGNIFICANT CHANGE UP (ref 3.8–10.5)
WBC # FLD AUTO: 7.22 K/UL — SIGNIFICANT CHANGE UP (ref 3.8–10.5)

## 2020-01-31 PROCEDURE — 71046 X-RAY EXAM CHEST 2 VIEWS: CPT | Mod: 26

## 2020-01-31 PROCEDURE — 99220: CPT

## 2020-01-31 PROCEDURE — 99285 EMERGENCY DEPT VISIT HI MDM: CPT

## 2020-01-31 PROCEDURE — 99282 EMERGENCY DEPT VISIT SF MDM: CPT

## 2020-01-31 RX ORDER — ESCITALOPRAM OXALATE 10 MG/1
10 TABLET, FILM COATED ORAL DAILY
Refills: 0 | Status: DISCONTINUED | OUTPATIENT
Start: 2020-01-31 | End: 2020-02-04

## 2020-01-31 RX ORDER — IPRATROPIUM/ALBUTEROL SULFATE 18-103MCG
3 AEROSOL WITH ADAPTER (GRAM) INHALATION EVERY 4 HOURS
Refills: 0 | Status: DISCONTINUED | OUTPATIENT
Start: 2020-01-31 | End: 2020-02-04

## 2020-01-31 RX ORDER — METOCLOPRAMIDE HCL 10 MG
10 TABLET ORAL ONCE
Refills: 0 | Status: COMPLETED | OUTPATIENT
Start: 2020-01-31 | End: 2020-01-31

## 2020-01-31 RX ORDER — FAMOTIDINE 10 MG/ML
40 INJECTION INTRAVENOUS
Refills: 0 | Status: DISCONTINUED | OUTPATIENT
Start: 2020-01-31 | End: 2020-02-04

## 2020-01-31 RX ORDER — AZITHROMYCIN 500 MG/1
500 TABLET, FILM COATED ORAL ONCE
Refills: 0 | Status: COMPLETED | OUTPATIENT
Start: 2020-01-31 | End: 2020-01-31

## 2020-01-31 RX ORDER — IPRATROPIUM/ALBUTEROL SULFATE 18-103MCG
3 AEROSOL WITH ADAPTER (GRAM) INHALATION
Refills: 0 | Status: COMPLETED | OUTPATIENT
Start: 2020-01-31 | End: 2020-01-31

## 2020-01-31 RX ORDER — FLUTICASONE PROPIONATE 50 MCG
1 SPRAY, SUSPENSION NASAL
Refills: 0 | Status: DISCONTINUED | OUTPATIENT
Start: 2020-01-31 | End: 2020-02-04

## 2020-01-31 RX ORDER — ACETAMINOPHEN 500 MG
650 TABLET ORAL ONCE
Refills: 0 | Status: COMPLETED | OUTPATIENT
Start: 2020-01-31 | End: 2020-01-31

## 2020-01-31 RX ORDER — ALBUTEROL 90 UG/1
2.5 AEROSOL, METERED ORAL ONCE
Refills: 0 | Status: COMPLETED | OUTPATIENT
Start: 2020-01-31 | End: 2020-01-31

## 2020-01-31 RX ORDER — SODIUM CHLORIDE 9 MG/ML
1000 INJECTION INTRAMUSCULAR; INTRAVENOUS; SUBCUTANEOUS ONCE
Refills: 0 | Status: COMPLETED | OUTPATIENT
Start: 2020-01-31 | End: 2020-01-31

## 2020-01-31 RX ORDER — PANTOPRAZOLE SODIUM 20 MG/1
40 TABLET, DELAYED RELEASE ORAL
Refills: 0 | Status: DISCONTINUED | OUTPATIENT
Start: 2020-01-31 | End: 2020-02-04

## 2020-01-31 RX ORDER — MAGNESIUM SULFATE 500 MG/ML
1 VIAL (ML) INJECTION ONCE
Refills: 0 | Status: COMPLETED | OUTPATIENT
Start: 2020-01-31 | End: 2020-01-31

## 2020-01-31 RX ADMIN — Medication 3 MILLILITER(S): at 16:54

## 2020-01-31 RX ADMIN — Medication 650 MILLIGRAM(S): at 16:54

## 2020-01-31 RX ADMIN — ALBUTEROL 2.5 MILLIGRAM(S): 90 AEROSOL, METERED ORAL at 20:24

## 2020-01-31 RX ADMIN — Medication 3 MILLILITER(S): at 16:36

## 2020-01-31 RX ADMIN — SODIUM CHLORIDE 1000 MILLILITER(S): 9 INJECTION INTRAMUSCULAR; INTRAVENOUS; SUBCUTANEOUS at 16:53

## 2020-01-31 RX ADMIN — Medication 100 GRAM(S): at 16:53

## 2020-01-31 RX ADMIN — Medication 3 MILLILITER(S): at 16:15

## 2020-01-31 RX ADMIN — Medication 10 MILLIGRAM(S): at 16:54

## 2020-01-31 RX ADMIN — Medication 104 MILLIGRAM(S): at 16:36

## 2020-01-31 RX ADMIN — AZITHROMYCIN 250 MILLIGRAM(S): 500 TABLET, FILM COATED ORAL at 23:33

## 2020-01-31 NOTE — CONSULT NOTE ADULT - SUBJECTIVE AND OBJECTIVE BOX
CHIEF COMPLAINT: SOB/ Fever/ wheezing     HPI: 44 F never smoker with a PMH of obesity, TEODORA on nocturnal CPAP, severe [persistent asthma and GERD now presenting with shortness of breath and fever since Monday. Pt reports that she developed shortness of breath along with wheezing, a productive cough and a T max of 101 F on Monday. She was prescribed Prednisone 30 mg daily by Dr. Elizabeth's office , advised to increase the frequency of nebulizer treatments and visit an urgent care center to get a Flu swab. Per pt her Flu swab was negative but she was prescribed Tamiflu d/t her symptoms. She has continued to experience progressively worsening SOB and wheezing even at rest over the past few days. She also endorses subjective fever and chills along with a cough intermittently productive of yellow sputum. She slo reports post nasal drip and a generalized headache over the past 33 days. She denies chest pain but reports chest pressure d/t the SOB. She also reports ongoing body aches, malaise and a poor appetite. Of note she was hospitalized in December d/t an asthma exacerbation 2/2 parainfluenza infection. She has no h/o ICU admission or [facility] ventilation 2/2 asthma. In the Ed she ws afebrile with an O2 sat of 98 % Ra at rest. She received Duonebs and is ordered for 125 mg of IV solumedrol, 1 gm of Magnesium and a 1L bolus of NS. Awaiting CXR.     PAST MEDICAL & SURGICAL HISTORY:  Menorrhagia  Uterine fibroid  Hypothyroidism: resolved as per pt  H/O eosinophilia  H/O gastroesophageal reflux (GERD)  Obesity  TEODORA on CPAP  Asthma: chronic severe, followed by Dr. Elizabeth, 3-4 episodes of asthma bronchitis over the winter treated with po steroids, denies any exacerbation since 3/2019 or intubation hx  Status post D&C: hysteroscopy  S/P LASIK surgery  S/P ovarian cystectomy: left 2011      FAMILY HISTORY:  Family history of asthma  Family history of hypertension      SOCIAL HISTORY:  Smoking: [x ] Never Smoked [ ] Former Smoker (__ packs x ___ years) [ ] Current Smoker  (__ packs x ___ years)  Substance Use: [ x] Never Used [ ] Used ____  EtOH Use: Social   Marital Status: [x ] Single [ ]  [ ]  [ ]   Sexual History: NC  Occupation:  at an elementary school   Recent Travel: None   Advance Directives: Full code     Allergies    Mobic (Rash)  shellfish (Hives)    Intolerances        HOME MEDICATIONS:  Reviewed     REVIEW OF SYSTEMS:  Constitutional: [ ] negative [+ ] fevers [+ ] chills [ -] weight loss [ ] weight gain  HEENT: [ ] negative [ ] dry eyes [ ] eye irritation [ +] postnasal drip [ ] nasal congestion  CV: [ ] negative  [- ] chest pain [- ] orthopnea [ ] palpitations [ ] murmur  Resp: [ ] negative [+] cough [+ ] shortness of breath [ ] dyspnea [+ ] wheezing [+ ] sputum [ ] hemoptysis  GI: [ ] negative [- ] nausea [- ] vomiting [ -] diarrhea [- ] constipation [- ] abd pain [ ] dysphagia   : [ ] negative [ ] dysuria [- ] nocturia [- ] hematuria [- ] increased urinary frequency  Musculoskeletal: [ ] negative [- ] back pain [- ] myalgias [ ] arthralgias [ ] fracture  Skin: [ ] negative [- ] rash [ ] itch  Neurological: [ ] negative [+ ] headache [- ] dizziness [- ] syncope [ ] weakness [ ] numbness  Psychiatric: [ ] negative [ ] anxiety [- ] depression  Endocrine: [ ] negative [ ] diabetes [- ] thyroid problem  Hematologic/Lymphatic: [ ] negative [ ] anemia [- ] bleeding problem  Allergic/Immunologic: [ ] negative [ ] itchy eyes [- ] nasal discharge [ ] hives [ ] angioedema  [x ] All other systems negative  [ ] Unable to assess ROS because ________    OBJECTIVE:  ICU Vital Signs Last 24 Hrs  T(C): 36.8 (31 Jan 2020 12:15), Max: 36.8 (31 Jan 2020 12:15)  T(F): 98.2 (31 Jan 2020 12:15), Max: 98.2 (31 Jan 2020 12:15)  HR: 80 (31 Jan 2020 12:15) (80 - 80)  BP: 134/88 (31 Jan 2020 12:15) (134/88 - 134/88)  BP(mean): --  ABP: --  ABP(mean): --  RR: 18 (31 Jan 2020 12:15) (18 - 18)  SpO2: 98% (31 Jan 2020 12:15) (98% - 98%)        CAPILLARY BLOOD GLUCOSE          PHYSICAL EXAM:  General: NAD, able to speak in full sentences, no accessory muscle use   HEENT: PERRLA  Lymph Nodes: No palpable cervical LNs  Neck: Supple  Respiratory: Poor air entry b/l, no wheezing or crackles   Cardiovascular: Tachy, RRR, S1+S2+0  Abdomen: Soft, NT, ND, BS+  Extremities: No edema, pulses + b/l LEs  Skin: No rash   Neurological: AAOx3, grossly non focal   Psychiatry: Normal mood     HOSPITAL MEDICATIONS:          albuterol/ipratropium for Nebulization. 3 milliLiter(s) Nebulizer every 20 minutes    acetaminophen   Tablet .. 650 milliGRAM(s) Oral once  metoclopramide Injectable 10 milliGRAM(s) IV Push once          magnesium sulfate  IVPB 1 Gram(s) IV Intermittent Once  sodium chloride 0.9% Bolus 1000 milliLiter(s) IV Bolus once            LABS:    Hgb Trend:         Creatinine Trend:             MICROBIOLOGY:     RADIOLOGY:    [ ] Reviewed and interpreted by me    PULMONARY FUNCTION TESTS:    EKG:

## 2020-01-31 NOTE — ED ADULT NURSE REASSESSMENT NOTE - NS ED NURSE REASSESS COMMENT FT1
Received pt from [facility], received pt alert and responsive, oriented x4, denies any respiratory distress, SOB, or difficulty breathing. Pt transferred to CDU for asthma exacerbation. Pt states mild relief in symptoms. O2 SAT WNL, no respiratory distress noted. HOB maintained at 45 degrees.   IV in place, patent and free of signs of infiltration, placed on continuos O2 monitoring.   pt denies chest pain or palpitations, V/S stable, pt afebrile, pt denies pain at this time. Pt educated on unit and unit rules, instructed patient to notify RN of any needed assistance, Pt verbalizes understanding, Call bell placed within reach. Safety maintained. Will continue to monitor. Meal and beverage provided per pt request.

## 2020-01-31 NOTE — ED CDU PROVIDER INITIAL DAY NOTE - OBJECTIVE STATEMENT
45 y/o F PMHx obesity, GERD TEODORA on nocturnal CPAP, moderate persistent asthma (prior hospitalization most recently last month due to parainfluenzae, no prior ICU or intubations), presenting with shortness of breath and fever for 4 days. 5 days ago pt was prescribed 30mg prednisone by Dr. Elizabeth, had "false positive" flu swab at  and was given tamiflu. Pt reevaluated at  today, negative flu swab and referred to ED. Chest tightness no chest pain. 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 persistent headache w.o vision change and not maximal at onset. No numbness/weakness. +chills, no fever. Denies hemoptysis, leg pain/swelling, long car/plane rides, hormone use, h/o DVT/PE.  ED Course: Labs non-actionable, CXR without acute findings, RVP negative. Pt given duonebs, albuterol nebulized, solumedrol, and Mg in ED. Pt evaluated by pulm attending who agreed with ED management recommended azithromycin, flonase, duonebs, steroids. Pt sent to CDU for frequent eval and further management of asthma exacerbation. Albendazole Counseling:  I discussed with the patient the risks of albendazole including but not limited to cytopenia, kidney damage, nausea/vomiting and severe allergy.  The patient understands that this medication is being used in an off-label manner.

## 2020-01-31 NOTE — ED PROVIDER NOTE - CLINICAL SUMMARY MEDICAL DECISION MAKING FREE TEXT BOX
h.o asthma, p.w wheezes and shortness of breath w. fever. on prednisone po and symptom persistent, concerning for asthma exacerbation 2/2 flu. will get cxr for pna. labs and symptomatic treatment.

## 2020-01-31 NOTE — ED CDU PROVIDER INITIAL DAY NOTE - MEDICAL DECISION MAKING DETAILS
Naomy Pink MD 44 F w/ PMH of obesity, GERD, TEODORA on CPAP, asthma prior hospitalization w/ last month 2/2 parainfluenza, no prior ICU or intubations pt was started on prednisone, she was observedin the ed and had persistent wheeze, as a result pt was sent to Obs for continued monitoring

## 2020-01-31 NOTE — CONSULT NOTE ADULT - ASSESSMENT
44 F never smoker with a PMH of obesity, TEODORA on nocturnal CPAP, severe persistent asthma and GERD now presenting with an asthma exacerbation in the setting of likely a viral URI    1. Asthma exacerbation:  - Currently with poor air entry in b/l lung fields   - Agree with IV solumedrol and magnesium IV  - Cont Duonebs   - Check RVP ( reportedly Flu negative in urgent care, taking Tamilfu)  - Awaiting labs and CXR   - Concern for superimposed bronchitis, would suggest Azithromycin x 5 days   - Add Flonase BID   - Unclear if she will require admission at present. Would reassess following IV steroids/ nebs  - Follow peak flow rate   - Once resp status stabilizes she can resume home doses of Symbicort BID, Singulair and Spiriva daily   - Keep O2 sat > 90 %    2. TEODORA:  - Cont nocturnal CPAP    3. Gen:  - FUP with Dr. Elizabeth 44 F never smoker with a PMH of obesity, TEODORA on nocturnal CPAP, severe persistent asthma and GERD now presenting with an asthma exacerbation in the setting of likely a viral URI    1. Asthma exacerbation:  - Currently with poor air entry in b/l lung fields   - Agree with IV solumedrol and magnesium IV  - Cont Duonebs   - Check RVP ( reportedly Flu negative in urgent care, taking Tamilfu)  - Awaiting labs and CXR   - Concern for superimposed bronchitis, would suggest Azithromycin x 5 days   - Add Flonase BID   - Follow peak flow rate   - Once resp status stabilizes she can resume home doses of Symbicort BID, Singulair and Spiriva daily   - Unclear if she will require admission at present. Would reassess following IV steroids/ nebs and review of labs/ CXR   - Keep O2 sat > 90 %    2. TEODORA:  - Cont nocturnal CPAP    3. Gen:  - FUP with Dr. Elizabeth

## 2020-01-31 NOTE — ED ADULT TRIAGE NOTE - CHIEF COMPLAINT QUOTE
sent by PCP for "acute asthma exacerbation". c/o shortness of breath, unproductive cough x 1 week. Patient being treated with tamiflu for false negative flu test.

## 2020-01-31 NOTE — ED PROVIDER NOTE - ATTENDING CONTRIBUTION TO CARE
44 F w/ PMH of asthma never intubated or on BIPAP before, TEODORA on CPAP, nonsmoker p/w SOB and fever was recently started on prednisone 30 mg for 3 days and symptoms have been persistent. pt presents w/ sob.   I have reviewed the triage vital signs. Const: Well-nourished, Well-developed, Eyes: no conjunctival injection and no scleral icterus ENMT: Moist mucus membranes, CVS: +S1/S2, radial/DP pulse 2+ bilaterally RESP: decreased air movement, w/ inspiratory and expiratory wheeze GI: Nontender/Nondistended soft abdomen, no CVA tenderness MSK: Extremities w/o deformity or ttp, Psych: Awake, Alert, & Orientedx3;  Appropriate mood and affect, cooperative  concern for asthma exacerbation likely secondary to viral syndrome. Pt seen by pulmonary in the ER recommending pt be observed.   With persistent wheeze, peak flow is 375 predicted 460

## 2020-01-31 NOTE — ED CDU PROVIDER INITIAL DAY NOTE - FAMILY HISTORY
Father  Still living? Unknown  Family history of asthma, Age at diagnosis: Age Unknown     Mother  Still living? Unknown  Family history of asthma, Age at diagnosis: Age Unknown     Sibling  Still living? Unknown  Family history of asthma, Age at diagnosis: Age Unknown

## 2020-01-31 NOTE — ED PROVIDER NOTE - OBJECTIVE STATEMENT
44F, h.o obesity, TEODORA on nocturnal CPAP, asthma, presenting with shortness of breath and fever for 4 days. Pt was prescribed 30mg prednisone by Dr. Elizabeth and had a neg flu at urgent care. Chest tightness no chest pain. 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 persistent headache w.o vision change and not maximal at onset. No numbness/weakness. No h.o intubation in the past but was admitted for asthma exacerbation w. parainflu few months ago.

## 2020-01-31 NOTE — ED PROVIDER NOTE - PROGRESS NOTE DETAILS
Barrington Jara, PGY 2: symptom improved. pending RVP results. will reassess. will sign out to night resident.

## 2020-01-31 NOTE — ED ADULT NURSE NOTE - NSIMPLEMENTINTERV_GEN_ALL_ED
Implemented All Fall with Harm Risk Interventions:  [place] to call system. Call bell, personal items and telephone within reach. Instruct patient to call for assistance. Room bathroom lighting operational. Non-slip footwear when patient is off stretcher. Physically safe environment: no spills, clutter or unnecessary equipment. Stretcher in lowest position, wheels locked, appropriate side rails in place. Provide visual cue, wrist band, yellow gown, etc. Monitor gait and stability. Monitor for mental status changes and reorient to person, place, and time. Review medications for side effects contributing to fall risk. Reinforce activity limits and safety measures with patient and family. Provide visual clues: red socks.

## 2020-01-31 NOTE — ED CDU PROVIDER INITIAL DAY NOTE - PHYSICAL EXAMINATION
GEN: Pt in NAD, non-toxic, A&O x3.  PSYCH: Affect appropriate.  EYES: Sclera white w/o injection, PERRLA.   ENT: Head NCAT. Nose w/o deformity. No auricular TTP. MMM. Neck supple FROM.   RESP: Pt able to speak in full sentences, no signs of acute respiratory distress, restrict expirtoary wheezes throughout, improve transiently with coughing. No rales. Peak flow 390 at bedside.  CARDIAC: RRR, clear distinct S1, S2, no appreciable murmurs.  ABD: Abdomen soft, non-tender. No CVAT b/l.  VASC: 2+ radial and dorsalis pedis pulses b/l. No edema or calf tenderness.  SKIN: No rashes on the trunk. GEN: Pt in NAD, non-toxic, A&O x3.  PSYCH: Affect appropriate.  EYES: Sclera white w/o injection, PERRLA.   ENT: Head NCAT. Nose w/o deformity. No auricular TTP. MMM. Neck supple FROM.   RESP: Pt able to speak in full sentences, no signs of acute respiratory distress, restrict expiratory wheezes throughout, improve transiently with coughing. No rales. Peak flow 390 at bedside.  CARDIAC: RRR, clear distinct S1, S2, no appreciable murmurs.  ABD: Abdomen soft, non-tender. No CVAT b/l.  VASC: 2+ radial and dorsalis pedis pulses b/l. No edema or calf tenderness.  SKIN: No rashes on the trunk.

## 2020-01-31 NOTE — ED ADULT NURSE REASSESSMENT NOTE - COMFORT CARE
ambulated to bathroom/plan of care explained/po fluids offered/repositioned/warm blanket provided/darkened lights/meal provided

## 2020-01-31 NOTE — ED PROVIDER NOTE - PHYSICAL EXAMINATION
General: NAD, good hygiene, well developed  HENT: Atraumatic, EOMI, no conjunctivae injection, moist mucosa, no post. oropharynx erythema, exudates  Neck: normal ROM and trachea midline   Cardiovascular: tachycardiac, S1&2, no M or R, radial pulses equal and b/l  Respiratory: CTABL, poor airflow, no decreased breath sounds  Abdominal: soft and non-tender non distended, neg for guarding, fisher's sign, rovsing's sign, mcburney's sign, no CVA tenderness   Extremities: no edema of the legs/feet, DP/PT equal b/l  Skin: warm, well perfused  Neurologic: nonfocal, AAOx3  Psych: normal mood and affect

## 2020-02-01 ENCOUNTER — APPOINTMENT (OUTPATIENT)
Dept: MRI IMAGING | Facility: IMAGING CENTER | Age: 45
End: 2020-02-01

## 2020-02-01 VITALS
RESPIRATION RATE: 17 BRPM | DIASTOLIC BLOOD PRESSURE: 61 MMHG | HEART RATE: 76 BPM | SYSTOLIC BLOOD PRESSURE: 118 MMHG | TEMPERATURE: 98 F | OXYGEN SATURATION: 97 %

## 2020-02-01 PROCEDURE — 80053 COMPREHEN METABOLIC PANEL: CPT

## 2020-02-01 PROCEDURE — 99217: CPT

## 2020-02-01 PROCEDURE — 87581 M.PNEUMON DNA AMP PROBE: CPT

## 2020-02-01 PROCEDURE — 87486 CHLMYD PNEUM DNA AMP PROBE: CPT

## 2020-02-01 PROCEDURE — 71046 X-RAY EXAM CHEST 2 VIEWS: CPT

## 2020-02-01 PROCEDURE — 94640 AIRWAY INHALATION TREATMENT: CPT

## 2020-02-01 PROCEDURE — G0378: CPT

## 2020-02-01 PROCEDURE — 84702 CHORIONIC GONADOTROPIN TEST: CPT

## 2020-02-01 PROCEDURE — 87633 RESP VIRUS 12-25 TARGETS: CPT

## 2020-02-01 PROCEDURE — 85027 COMPLETE CBC AUTOMATED: CPT

## 2020-02-01 PROCEDURE — 96375 TX/PRO/DX INJ NEW DRUG ADDON: CPT

## 2020-02-01 PROCEDURE — 96365 THER/PROPH/DIAG IV INF INIT: CPT

## 2020-02-01 PROCEDURE — 96376 TX/PRO/DX INJ SAME DRUG ADON: CPT

## 2020-02-01 PROCEDURE — 99285 EMERGENCY DEPT VISIT HI MDM: CPT | Mod: 25

## 2020-02-01 PROCEDURE — 94660 CPAP INITIATION&MGMT: CPT

## 2020-02-01 PROCEDURE — 87798 DETECT AGENT NOS DNA AMP: CPT

## 2020-02-01 RX ORDER — AZITHROMYCIN 500 MG/1
1 TABLET, FILM COATED ORAL
Qty: 5 | Refills: 0
Start: 2020-02-01 | End: 2020-02-05

## 2020-02-01 RX ADMIN — Medication 3 MILLILITER(S): at 00:28

## 2020-02-01 RX ADMIN — AZITHROMYCIN 500 MILLIGRAM(S): 500 TABLET, FILM COATED ORAL at 00:33

## 2020-02-01 RX ADMIN — FAMOTIDINE 40 MILLIGRAM(S): 10 INJECTION INTRAVENOUS at 00:09

## 2020-02-01 RX ADMIN — Medication 3 MILLILITER(S): at 04:08

## 2020-02-01 RX ADMIN — Medication 60 MILLIGRAM(S): at 04:08

## 2020-02-01 RX ADMIN — ESCITALOPRAM OXALATE 10 MILLIGRAM(S): 10 TABLET, FILM COATED ORAL at 00:28

## 2020-02-01 RX ADMIN — Medication 1 SPRAY(S): at 00:28

## 2020-02-01 RX ADMIN — Medication 3 MILLILITER(S): at 08:08

## 2020-02-01 NOTE — ED CDU PROVIDER SUBSEQUENT DAY NOTE - ATTENDING CONTRIBUTION TO CARE
Attending MD Snowden:   I personally have seen and examined this patient.  Physician assistant note reviewed and agree on plan of care and except where noted.  See below for details.     Seen in CDU 8    44F with PMH/PSH including asthma, TEODORA on CPAP at night sent to the CDU after presenting to the ED with shortness of breath and fever.  Labs, CXR, RVP nonactionable in ED, sent to CDU for continued nebs, steroids, evals, PFs.  Seen by pulm, suspect acute asthma exacerbation, bronchitis, recommendations including prednisone 50mg x 5 days, Azithromycin 500mg x 5 days.  Patient feeling improved. Attending MD Snowden:   I personally have seen and examined this patient.  Physician assistant note reviewed and agree on plan of care and except where noted.  See below for details.     Seen in CDU 8    44F with PMH/PSH including asthma, TEODORA on CPAP at night, GERD sent to the CDU after presenting to the ED with shortness of breath and fever.  Labs, CXR, RVP nonactionable in ED, sent to CDU for continued nebs, steroids, evals, PFs.  Seen by pulm, suspect acute asthma exacerbation, bronchitis, recommendations including prednisone 50mg x 5 days, Azithromycin 500mg x 5 days.  Patient feeling improved.  Denies chest pain, shortness of breath, palpitations. Denies abdominal pain, nausea, vomiting, diarrhea, blood in stools. Denies dysuria, hematuria, change in urinary habits including frequency, urgency. Denies fevers, chills, dizziness, weakness. On exam, NAD, head NCAT, PERRL, FROM at neck, no tenderness to midline palpation, no stepoffs along length of spine, lungs scattered wheezing all lung fields, no rhonchi, no rales, cough with good inspiratory effort, +S1S2, no m/r/g, abdomen soft with +BS, NT, ND, no CVAT, moving all extremities with 5/5 strength bilateral upper and lower extremities, good and equal  strength bilaterally, no calf tenderness, swelling, erythema or warmth; A/P: 44F with shortness of breath, suspected acute asthma exacerbation/bronchitis, will Rx Azithro and Prednisone as per Pulm.  Reports will follow up with Pulm.  Feeling improved.  No acute issues at  this time.  Lab and radiology tests reviewed with patient.  Patient stable for discharge. Follow up instructions given, importance of follow up emphasized, return to ED parameters reviewed and patient verbalized understanding.  All questions answered, all concerns addressed.

## 2020-02-01 NOTE — ED CDU PROVIDER DISPOSITION NOTE - PATIENT PORTAL LINK FT
You can access the FollowMyHealth Patient Portal offered by [facility] by registering at the following website: [email or web address]. By joining [facility]’s FollowMyHealth portal, you will also be able to view your health information using other applications (apps) compatible with our system.

## 2020-02-01 NOTE — PROGRESS NOTE ADULT - ASSESSMENT
44 F never smoker with a PMH of obesity, TEODORA on nocturnal CPAP, severe persistent asthma and GERD now presenting with an asthma exacerbation in the setting of likely a viral URI    1.A. Asthma exacerbation:  - Agree with IV solumedrol and magnesium IV--can transition to PO pred 50mg per day  - Cont Duonebs - Symbicort BID, Singulair and Spiriva daily   - s/p RVP ( reportedly Flu negative in urgent care, taking Tamilfu)--negative  -B. Bronchitis------continue Azithromycin x 5 days   C.-Allergy--Flonase BID   2. TEODORA:  - Cont nocturnal CPAP  3. Gen: GERD-ppi am and pepcid qhs  ******************  2/1-slightly better-optimally would DC to home--consider another biologic in future-dupixent

## 2020-02-01 NOTE — ED CDU PROVIDER SUBSEQUENT DAY NOTE - HISTORY
Pt sleeping with CPAP machine on SpO2 100%. VS stable from last reading. Will continue to monitor. -Alessio Murphy PA-C

## 2020-02-01 NOTE — PROGRESS NOTE ADULT - ATTENDING COMMENTS
as above-  multifactorial dyspnea-severe persistent asthma flair (RVP neg), obesity, debility--RA sat above 90%  asthma-transition to po pred 50mg per day for 1 week then we will taper as out pt, symbicort/spiriva, nebs q 6, singulair, will consider another          biologic as out pt  bronchitis-zithromax 500q day for 5 days  osas-out pt cpap  GI-ppi in am and pepcid 40 q hs  DC planning to home--appt this week end of week  Rodrigue Elizabeth MD-Pulmonary   [phone]

## 2020-02-01 NOTE — ED CDU PROVIDER DISPOSITION NOTE - ATTENDING CONTRIBUTION TO CARE
Attending MD Snowden:   I personally have seen and examined this patient.  Physician assistant note reviewed and agree on plan of care and except where noted.  See below for details.     Seen in CDU 8    44F with PMH/PSH including asthma, TEODORA on CPAP at night, GERD sent to the CDU after presenting to the ED with shortness of breath and fever.  Labs, CXR, RVP nonactionable in ED, sent to CDU for continued nebs, steroids, evals, PFs.  Seen by pulm, suspect acute asthma exacerbation, bronchitis, recommendations including prednisone 50mg x 5 days, Azithromycin 500mg x 5 days.  Patient feeling improved.  Denies chest pain, shortness of breath, palpitations. Denies abdominal pain, nausea, vomiting, diarrhea, blood in stools. Denies dysuria, hematuria, change in urinary habits including frequency, urgency. Denies fevers, chills, dizziness, weakness. On exam, NAD, head NCAT, PERRL, FROM at neck, no tenderness to midline palpation, no stepoffs along length of spine, lungs scattered wheezing all lung fields, no rhonchi, no rales, cough with good inspiratory effort, +S1S2, no m/r/g, abdomen soft with +BS, NT, ND, no CVAT, moving all extremities with 5/5 strength bilateral upper and lower extremities, good and equal  strength bilaterally, no calf tenderness, swelling, erythema or warmth; A/P: 44F with shortness of breath, suspected acute asthma exacerbation/bronchitis, will Rx Azithro and Prednisone as per Pulm.  Reports will follow up with Pulm.  Feeling improved.  No acute issues at  this time.  Lab and radiology tests reviewed with patient.  Patient stable for discharge. Follow up instructions given, importance of follow up emphasized, return to ED parameters reviewed and patient verbalized understanding.  All questions answered, all concerns addressed.

## 2020-02-01 NOTE — ED CDU PROVIDER DISPOSITION NOTE - NSFOLLOWUPINSTRUCTIONS_ED_ALL_ED_FT
1) Follow-up with your PCP in 2-3 days    2) Continue taking all medications as prescribed. Take prednisone as prescribed for asthma exacerbation. Use albuterol inhaler OR nebulizer every 4-6 hours for asthma symptoms.    3) Return to the ER for any new or worsening symptoms including but not limited to chest pain, worsening cough, chest pain, shortness of breath or wheezing despite using albuterol, leg pain or swelling, fevers, chills, or any other concerns. 1) Follow-up with your PCP in 2-3 days     Follow-up with your pulmonologist within 1 week.    2) Continue taking all medications as prescribed.       Take prednisone as prescribed for asthma exacerbation.      Take azithromycin (antibiotic) as prescribed, finish the full course of antibiotics.      Use nebulizer every 6 hours for asthma symptoms.    3) Return to the ER for any new or worsening symptoms including but not limited to chest pain, worsening cough, chest pain, shortness of breath or wheezing despite using albuterol, leg pain or swelling, fevers, chills, or any other concerns. 1) Follow-up with your PCP in 2-3 days, follow up Anemia (Low hemoglobin/hematocrit) with your Doctor.     Follow-up with your pulmonologist within 1 week.  Your blood glucose was elevated here in hospital, steroids can elevate your glucose (sugar) level, please follow low carb/sugar diet.     2) Continue taking all medications as prescribed.       Take prednisone 50mg daily as prescribed for asthma exacerbation, you will receive 1 week from us and you will need to follow up with your Pulmonologist in 1 week for the additional prednisone tapered.      Take azithromycin (antibiotic) as prescribed, finish the full course of antibiotics.      Use nebulizer every 6 hours for wheezing, difficulty breathing, shortness of breath as needed.    3) Return to the ER for any new or worsening symptoms including but not limited to chest pain, worsening cough, chest pain, shortness of breath or wheezing despite using albuterol, leg pain or swelling, fevers, chills, or any other concerns.

## 2020-02-01 NOTE — ED CDU PROVIDER SUBSEQUENT DAY NOTE - PROGRESS NOTE DETAILS
pt evaluated at bedside by pulm attending. Pt cleared for DC from pulm perspective. -Alessio Murphy PA-C CDU NOTE BREONNA Garcia: pt resting comfortably, feels improved. NAD VSS. lungs- initially mild expiratory wheeze throughout that cleared with pt coughing, moving air well, clear lungs. CDU NOTE BREONNA Garcia: as per Pulm attending note- ok to d/c home with Azithro 500mg x 5 days, pred 50mg po daily (he will taper outpt) and continued home medications. CDU NOTE BREONNA Gacria: as per Dr. Snowden, pt stable for d/c home with outpt meds per Pulm.

## 2020-02-01 NOTE — ED CDU PROVIDER SUBSEQUENT DAY NOTE - PHYSICAL EXAMINATION
GEN: Pt in NAD, A&O x3.  PSYCH: Affect appropriate.  EYES: Sclera white w/o injection.   ENT: Head NCAT. Nose w/o deformity. No auricular TTP. MMM. Neck supple FROM.   RESP: No chest wall tenderness, no respiratory distress, speaking in full sentences, diffuse expiratory wheeze, no rales.  CARDIAC: RRR, clear distinct S1, S2, no appreciable murmurs.  ABD: Abdomen soft, non-tender.  VASC: No edema or calf tenderness.  SKIN: No rashes on the trunk.

## 2020-02-01 NOTE — PROGRESS NOTE ADULT - SUBJECTIVE AND OBJECTIVE BOX
CHIEF COMPLAINT: f/up sob, asthma, allergy, gerd, obesity, osas--slightly better over all    Interval Events: IV solumedrol    REVIEW OF SYSTEMS:  Constitutional: No fevers or chills. No weight loss. No fatigue or generalized malaise.  Eyes: No itching or discharge from the eyes  ENT: No ear pain. No ear discharge. No nasal congestion. No post nasal drip. No epistaxis. No throat pain. No sore throat. No difficulty swallowing.   CV: No chest pain. No palpitations. No lightheadedness or dizziness.   Resp: No dyspnea at rest. + dyspnea on exertion. No orthopnea. No wheezing. + cough. No stridor. No sputum production. No chest pain with respiration.  GI: No nausea. No vomiting. No diarrhea.  MSK: No joint pain or pain in any extremities  Integumentary: No skin lesions. No pedal edema.  Neurological: No gross motor weakness. No sensory changes.  [+ ] All other systems negative  [ ] Unable to assess ROS because ________    OBJECTIVE:  ICU Vital Signs Last 24 Hrs  T(C): 36.7 (01 Feb 2020 04:05), Max: 37.1 (31 Jan 2020 19:21)  T(F): 98 (01 Feb 2020 04:05), Max: 98.7 (31 Jan 2020 19:21)  HR: 81 (01 Feb 2020 04:05) (76 - 89)  BP: 132/83 (01 Feb 2020 04:05) (112/68 - 138/89)  BP(mean): --  ABP: --  ABP(mean): --  RR: 18 (01 Feb 2020 04:05) (17 - 18)  SpO2: 100% (01 Feb 2020 04:05) (98% - 100%)        CAPILLARY BLOOD GLUCOSE          PHYSICAL EXAM: NAD on RA in stretcher  General: Awake, alert, oriented X 3.   HEENT: Atraumatic, normocephalic.                 Mallampatti Grade 3                No nasal congestion.                No tonsillar or pharyngeal exudates.  Lymph Nodes: No palpable lymphadenopathy  Neck: No JVD. No carotid bruit.   Respiratory: Normal chest expansion                         Normal percussion                         Normal and equal air entry                         + wheeze, no rhonchi or rales.  Cardiovascular: S1 S2 normal. No murmurs, rubs or gallops.   Abdomen: Soft, non-tender, non-distended. No organomegaly. Normoactive bowel sounds.  Extremities: Warm to touch. Peripheral pulse palpable. No pedal edema.   Skin: No rashes or skin lesions  Neurological: Motor and sensory examination equal and normal in all four extremities.  Psychiatry: Appropriate mood and affect.    HOSPITAL MEDICATIONS:  MEDICATIONS  (STANDING):  albuterol/ipratropium for Nebulization. 3 milliLiter(s) Nebulizer every 4 hours  escitalopram 10 milliGRAM(s) Oral daily  famotidine    Tablet 40 milliGRAM(s) Oral two times a day  fluticasone propionate 50 MICROgram(s)/spray Nasal Spray 1 Spray(s) Both Nostrils two times a day  methylPREDNISolone sodium succinate Injectable 60 milliGRAM(s) IV Push every 12 hours  pantoprazole    Tablet 40 milliGRAM(s) Oral before breakfast    MEDICATIONS  (PRN):      LABS:                        10.1   7.22  )-----------( 458      ( 31 Jan 2020 17:19 )             33.5     01-31    138  |  100  |  10  ----------------------------<  181<H>  3.5   |  22  |  0.62    Ca    8.8      31 Jan 2020 17:19    TPro  7.2  /  Alb  4.3  /  TBili  0.2  /  DBili  x   /  AST  13  /  ALT  22  /  AlkPhos  54  01-31              MICROBIOLOGY:     RADIOLOGY:  [ ] Reviewed and interpreted by me    Point of Care Ultrasound Findings:    PFT:    EKG:

## 2020-02-13 ENCOUNTER — APPOINTMENT (OUTPATIENT)
Dept: INTERVENTIONAL RADIOLOGY/VASCULAR | Facility: CLINIC | Age: 45
End: 2020-02-13

## 2020-02-19 ENCOUNTER — APPOINTMENT (OUTPATIENT)
Dept: PULMONOLOGY | Facility: CLINIC | Age: 45
End: 2020-02-19
Payer: COMMERCIAL

## 2020-02-19 ENCOUNTER — NON-APPOINTMENT (OUTPATIENT)
Age: 45
End: 2020-02-19

## 2020-02-19 VITALS
BODY MASS INDEX: 46.1 KG/M2 | RESPIRATION RATE: 17 BRPM | OXYGEN SATURATION: 98 % | HEART RATE: 129 BPM | HEIGHT: 64 IN | WEIGHT: 270 LBS | SYSTOLIC BLOOD PRESSURE: 140 MMHG | DIASTOLIC BLOOD PRESSURE: 80 MMHG

## 2020-02-19 PROCEDURE — 99214 OFFICE O/P EST MOD 30 MIN: CPT | Mod: 25

## 2020-02-19 PROCEDURE — 94010 BREATHING CAPACITY TEST: CPT

## 2020-02-19 NOTE — PROCEDURE
[FreeTextEntry1] : PFT revealed moderate restriction flows, with a FEV1 of 2.14L, which is 72% of predicted, with a normal flow volume loop\par \par FENO: Pt refused Niox\par \par \par (Jan 31 )WBC 7.22, HGB 10.1, PLT 458K, .01 EOS .1% (ON STEROIDS) \par \par ACT score (2.19.2020): 8\par \par

## 2020-02-19 NOTE — REASON FOR VISIT
[Follow-Up] : a follow-up visit [FreeTextEntry1] : allergic rhinitis, severe persistent asthma, GERD, eosinophilia, nocturia, sleep apnea, SOB and vitamin D deficiency, steroid dependent asthma, s/p hospitalization for asthmatic bronchitis

## 2020-02-19 NOTE — ASSESSMENT
[FreeTextEntry1] : Ms. Edwards is a 44 year old female with a history of severe persistent asthma,  steroid dependent asthma, allergies, GERD, OSAS, hypothyroidism, arthritis, anxiety, endometriosis, and obesity. She presents to the office for pulmonary s/p 2nd hospitalization for asthma \par \par Her SOB is due to: \par -anemia\par -asthma\par -obesity\par -poor mechanics of breathing \par -out of shape over weight\par \par \par \par Problem 1: Severe persistent asthma (poor control)\par - Prednisone; 20 mg for 5 days, then 10 mg for 5 days \par Information sheet given to the patient to be reviewed, this medication is never to be used without consulting the prescribing physician. Proper dietary restraint is necessary specifically salt containing foods, if any reaction may occur should be reported. \par \par -continue Symbicort 160 (2 puffs BID) \par -continue Qvar 80 (2 puffs BID) \par -continue Singulair 10 mg at bedtime \par - Continue DuoNeb via Hand held nebulizer prn, Q6H\par \par Asthma is believed to be caused by inherited (genetic) and environmental factor, but its exact cause is unknown. Asthma may be triggered by allergens, lung infections, or irritants in the air. Asthma triggers are different for each person \par -Inhaler technique reviewed as well as oral hygiene techniques reviewed with patient. Avoidance of cold air, extremes of temperature, rescue inhaler should be used before exercise. Order of medication reviewed with patient. Recommended use of a cool mist humidifier in the bedroom \par \par Problem 1A: Steroid Dependent Asthma\par - Dupixent Set Up \par Dupixent is a prescription medicine used with other asthma medicines for the maintenance treatment of moderate-to-severe asthma in people aged 12 years and older whose asthma is not controlled with their current asthma medicines. Dupixent helps prevent severe asthma attacks (exacerbations) and can improve your breathing. Dupixent may also help reduce the amount of oral corticosteroids you need while preventing severe asthma attacks and improving your breathing. Dupixent is not used to treat sudden breathing problems. Risks and side effect of Dupixent were discussed and reviewed with patient.\par \par \par problem 2: overweight\par Weight loss, exercise, and diet control were discussed and are highly encouraged. Treatment options were given such as, aqua therapy, and contacting a nutritionist. Recommended to use the elliptical, stationary bike, less use of treadmill. Mindful eating was explained to the patient Obesity is associated with worsening asthma, shortness of breath, and potential for cardiac disease, diabetes, and other underlying medical conditions. \par \par problem 3: poor breathing mechanics\par -Proper breathing techniques were reviewed with an emphasis of exhalation. Patient instructed to breath in for 1 second and out for four seconds. Patient was encouraged to not talk while walking.\par \par Problem 4: Anemia - s/p gynecological embolization\par -Hematin Anemia guard QD\par \par Problem 5: allergies/sinus\par -continue Qnasl 1 sniff BID \par -recommended to use "Navage" nasal rinse device \par -continue Astelin 0.15 1 sniff BID\par \par Environmental measures for allergies were encouraged including mattress and pillow cover, air purifier, and environmental controls.\par \par Problem 6:  GERD\par - Add Axid 300mg Before Bed time \par -continue Nexium 40 mg QD at breakfast \par -Rule of 2s: avoid eating too much, eating too late, eating too spicy, eating two hours before bed\par -Things to avoid including overeating, spicy foods, tight clothing, eating within three hours of bed, this list is not all inclusive. \par -For treatment of reflux, possible options discussed including diet control, H2 blockers, PPIs, as well as coating motility agents discussed as treatment options. Timing of meals and proximity of last meal to sleep were discussed. If symptoms persist, a formal gastrointestinal evaluation is needed. \par \par Problem 7: low vitamin D\par -Continue on supplemental vitamin D \par \par Problem 8: OSAS\par -continue to use CPAP every night, benefiting well \par - -Discussed the risks/associations with coronary artery disease, atrial fibrillation, arrhythmia, memory loss, issues with concentration, stroke risk, hypertension, nocturia, chronic reflux/Ferrara’s esophagus some but not all inclusive. Treatment options discussed including CPAP/BiPAP machine, oral appliance, ProVent therapy, Oxy-Aid by [employer]pite[employer], new technologies, or positional sleep.\par \par problem 9: Pulmonary Pre-Op clearance for GYN surgery\par -at this point in time there are no absolute pulmonary contraindications to go forward with the planned procedure  \par -at the time of surgery s/he should have optimal pain control, incentive spirometry, early ambulation, DVT and GI prophylaxis. \par \par problem 10: elevated eosinophil level (not elevate) (on steroids)\par -candidate for Nucala or Faserna\par \par -The safety and efficacy of Nucala was established in three double-blind, randomized, placebo-controlled trials in patients with severe asthma. Compared to a placebo, patients with severe asthma receiving Nucala had fewer exacerbation requiring hospitalization and/or emergency department visits, and a longer time to first exacerbation. In addition, patients with severe asthma receiving Nucala experienced greater reductions in their daily maintenance oral corticosteroid dose, while maintaining asthma control compared with patients receiving placebo. Treatment with Nucala did not result in a significant improvement in lung function, as measured by the volume of air exhaled by patients in one second. The most common side effects include: headache, injection site reactions, back pain, weakness, and fatigue; hypersensitivity reactions can occur within hours or days including swelling of the face, mouth, and tongue, fainting, dizziness, hives, breathing problems, and rash; herpes zoster infections have occurred. The drug is a monoclonal antibody that inhibits interleukin-5 which helps regular eosinophils, a type of white blood cell that contributes to asthma. The over-production of eosinophils can cause inflammation in the lungs, increasing the frequency of asthma attacks. Patients must also take other medications, including high dose inhaled corticosteroids and at least one additional asthma drug.\par \par problem 11: elevated IgE level\par -failed Xolair\par \par problem 12: health maintenance \par - Taking a leave of absence due to asthmatic \par -recommended yearly flu shot after October 15 - 2018\par -recommended strep pneumonia vaccines: Prevnar-13 vaccine, followed by Pneumo vaccine 23 one year following\par -recommended early intervention for URIs\par -recommended regular osteoporosis evaluations\par -recommended early dermatological evaluations\par -recommended after the age of 50 to the age of 70, colonoscopy every 5 years \par Recommended that she see a Psychologist to help her with her get back to "herself" \par \par F/u in 3-4 months - SPI (FeNO not covered, do not do.)\par pt is encouraged to call or fax the office with any questions or concerns. \par Pt is to exercise and diet to promote a healthy weight. \par Explained to the pt in full detail with demonstrations how to use the inhalers and inhaler hygiene.

## 2020-02-19 NOTE — ADDENDUM
[FreeTextEntry1] : Documented by Oneida Ordonez acting as a scribe for Dr. Rodrigue Elizabeth on 02/19/2020.\par \par All medical record entries made by the Scribe were at my, Dr. Rodrigue Elizabeth's, direction and personally dictated by me on 02/19/2020. I have reviewed the chart and agree that the record accurately reflects my personal performance of the history, physical exam, assessment and plan. I have also personally directed, reviewed, and agree with the discharge instructions.\par

## 2020-02-19 NOTE — HISTORY OF PRESENT ILLNESS
[FreeTextEntry1] : Ms. Edwards is a 44 year old female with a history of allergic rhinitis, severe persistent asthma, steroid dependent asthma, GERD, eosinophilia, nocturia, sleep apnea, SOB and vitamin D deficiency presenting to the office today just released from hospital for Asthmatic Bronchitis. \par - Just came out of hospital, she has asthmatic bronchitis \par - Her asthma has been active for 3-4 months \par - she has been on prednisone for the last 4 months \par - right now her breathing feels better \par - Shes still SOB when she exerts herself\par - She started on Valium for her Anxiety and she feels it has been irritating her stomach therefore affecting her reflux. \par - Her bowels are have regular \par - She has nocturia, she has been drinking more water because of the prednisone. \par - She has fibroids so that may also cause her to use the bathroom more often as well \par - she has been on Prednisone 20mg since yesterday \par - she has been using her CPAP properly \par -she denies any headaches, nausea, vomiting, fever, chills, sweats, chest pain, chest pressure, diarrhea, constipation, dysphagia, dizziness, sour taste in the mouth, leg swelling, leg pain, itchy eyes, itchy ears, heartburn, reflux, myalgias or arthralgias.\par

## 2020-02-19 NOTE — PHYSICAL EXAM
SPOUSE IN WAITING ROOM. IT IS OK TO SPEAK TO HIM AFTER PROCEDURE   [General Appearance - Well Developed] : well developed [Normal Appearance] : normal appearance [Well Groomed] : well groomed [No Deformities] : no deformities [General Appearance - Well Nourished] : well nourished [General Appearance - In No Acute Distress] : no acute distress [Normal Conjunctiva] : the conjunctiva exhibited no abnormalities [Eyelids - No Xanthelasma] : the eyelids demonstrated no xanthelasmas [Normal Oropharynx] : normal oropharynx [III] : III [Neck Appearance] : the appearance of the neck was normal [Neck Cervical Mass (___cm)] : no neck mass was observed [Jugular Venous Distention Increased] : there was no jugular-venous distention [Heart Rate And Rhythm] : heart rate and rhythm were normal [Thyroid Diffuse Enlargement] : the thyroid was not enlarged [Thyroid Nodule] : there were no palpable thyroid nodules [Heart Sounds] : normal S1 and S2 [Murmurs] : no murmurs present [Exaggerated Use Of Accessory Muscles For Inspiration] : no accessory muscle use [Respiration, Rhythm And Depth] : normal respiratory rhythm and effort [Abdomen Soft] : soft [Abdomen Tenderness] : non-tender [Abdomen Mass (___ Cm)] : no abdominal mass palpated [Abnormal Walk] : normal gait [Gait - Sufficient For Exercise Testing] : the gait was sufficient for exercise testing [Petechial Hemorrhages (___cm)] : no petechial hemorrhages [Nail Clubbing] : no clubbing of the fingernails [Cyanosis, Localized] : no localized cyanosis [Skin Color & Pigmentation] : normal skin color and pigmentation [Skin Lesions] : no skin lesions [] : no rash [No Venous Stasis] : no venous stasis [No Skin Ulcers] : no skin ulcer [Sensation] : the sensory exam was normal to light touch and pinprick [Deep Tendon Reflexes (DTR)] : deep tendon reflexes were 2+ and symmetric [No Xanthoma] : no  xanthoma was observed [Impaired Insight] : insight and judgment were intact [Oriented To Time, Place, And Person] : oriented to person, place, and time [No Focal Deficits] : no focal deficits [Affect] : the affect was normal [FreeTextEntry1] : I:E ratio 1:3; c

## 2020-03-12 ENCOUNTER — FORM ENCOUNTER (OUTPATIENT)
Age: 45
End: 2020-03-12

## 2020-03-13 ENCOUNTER — APPOINTMENT (OUTPATIENT)
Dept: MRI IMAGING | Facility: CLINIC | Age: 45
End: 2020-03-13
Payer: COMMERCIAL

## 2020-03-13 ENCOUNTER — OUTPATIENT (OUTPATIENT)
Dept: OUTPATIENT SERVICES | Facility: HOSPITAL | Age: 45
LOS: 1 days | End: 2020-03-13
Payer: COMMERCIAL

## 2020-03-13 DIAGNOSIS — R93.5 ABNORMAL FINDINGS ON DIAGNOSTIC IMAGING OF OTHER ABDOMINAL REGIONS, INCLUDING RETROPERITONEUM: ICD-10-CM

## 2020-03-13 DIAGNOSIS — D64.9 ANEMIA, UNSPECIFIED: ICD-10-CM

## 2020-03-13 DIAGNOSIS — Z98.890 OTHER SPECIFIED POSTPROCEDURAL STATES: Chronic | ICD-10-CM

## 2020-03-13 DIAGNOSIS — D25.9 LEIOMYOMA OF UTERUS, UNSPECIFIED: ICD-10-CM

## 2020-03-13 PROCEDURE — 72197 MRI PELVIS W/O & W/DYE: CPT | Mod: 26

## 2020-03-13 PROCEDURE — 72197 MRI PELVIS W/O & W/DYE: CPT

## 2020-03-13 PROCEDURE — A9585: CPT

## 2020-03-18 ENCOUNTER — APPOINTMENT (OUTPATIENT)
Dept: INTERVENTIONAL RADIOLOGY/VASCULAR | Facility: CLINIC | Age: 45
End: 2020-03-18

## 2020-05-10 NOTE — H&P ADULT - NSHPPHYSICALEXAM_GEN_ALL_CORE
Yes VITAL SIGNS:        PHYSICAL EXAM:     GENERAL: Pt having pain  HEENT: NC/AT, EOMI, neck supple, MMM  RESPIRATORY: LCTAB/L, no rhonchi, rales, or wheezing  CARDIOVASCULAR: RRR, no murmurs, gallops, rubs  ABDOMINAL: soft, TTP diffusely, non-distended, positive bowel sounds   EXTREMITIES: no clubbing, cyanosis, or edema  NEUROLOGICAL: alert and oriented x 3, non-focal  SKIN: no rashes or lesions   MUSCULOSKELETAL: no gross joint deformity

## 2020-07-08 ENCOUNTER — APPOINTMENT (OUTPATIENT)
Dept: PULMONOLOGY | Facility: CLINIC | Age: 45
End: 2020-07-08
Payer: COMMERCIAL

## 2020-07-08 DIAGNOSIS — M19.90 UNSPECIFIED OSTEOARTHRITIS, UNSPECIFIED SITE: ICD-10-CM

## 2020-07-08 DIAGNOSIS — J45.909 UNSPECIFIED ASTHMA, UNCOMPLICATED: ICD-10-CM

## 2020-07-08 PROCEDURE — 99214 OFFICE O/P EST MOD 30 MIN: CPT | Mod: 95

## 2020-07-08 RX ORDER — TIOTROPIUM BROMIDE INHALATION SPRAY 3.12 UG/1
2.5 SPRAY, METERED RESPIRATORY (INHALATION) DAILY
Qty: 3 | Refills: 3 | Status: ACTIVE | COMMUNITY
Start: 2019-12-03 | End: 1900-01-01

## 2020-07-08 RX ORDER — PREDNISONE 10 MG/1
10 TABLET ORAL
Qty: 60 | Refills: 0 | Status: DISCONTINUED | COMMUNITY
Start: 2019-12-02 | End: 2020-07-08

## 2020-07-08 RX ORDER — ESOMEPRAZOLE MAGNESIUM 40 MG/1
40 CAPSULE, DELAYED RELEASE ORAL
Qty: 90 | Refills: 1 | Status: ACTIVE | COMMUNITY
Start: 2020-07-08 | End: 1900-01-01

## 2020-07-08 RX ORDER — PREDNISONE 10 MG/1
10 TABLET ORAL
Qty: 100 | Refills: 0 | Status: DISCONTINUED | COMMUNITY
Start: 2019-12-03 | End: 2020-07-08

## 2020-07-08 RX ORDER — PREDNISONE 10 MG/1
10 TABLET ORAL
Qty: 21 | Refills: 1 | Status: DISCONTINUED | COMMUNITY
Start: 2019-11-21 | End: 2020-07-08

## 2020-07-08 NOTE — ASSESSMENT
[FreeTextEntry1] : Ms. Edwards is a 44 year old female with a history of severe persistent asthma,  steroid dependent asthma, allergies, GERD, OSAS, hypothyroidism, arthritis, anxiety, endometriosis, and obesity. She video calling to the office for improved on Dupixent (x2 months) \par \par Her SOB is due to: \par -anemia\par -asthma\par -obesity\par -poor mechanics of breathing \par -out of shape over weight\par \par Problem 1: Severe persistent asthma (poor control)\par -continue Symbicort 160 (2 puffs BID) \par -continue Qvar 80 (2 puffs BID) \par -continue Singulair 10 mg at bedtime \par - Continue DuoNeb via Hand held nebulizer prn, Q6H\par \par Asthma is believed to be caused by inherited (genetic) and environmental factor, but its exact cause is unknown. Asthma may be triggered by allergens, lung infections, or irritants in the air. Asthma triggers are different for each person \par -Inhaler technique reviewed as well as oral hygiene techniques reviewed with patient. Avoidance of cold air, extremes of temperature, rescue inhaler should be used before exercise. Order of medication reviewed with patient. Recommended use of a cool mist humidifier in the bedroom \par \par Problem 1A: Steroid Dependent Asthma\par - Dupixent in place (x 2months)\par Dupixent is a prescription medicine used with other asthma medicines for the maintenance treatment of moderate-to-severe asthma in people aged 12 years and older whose asthma is not controlled with their current asthma medicines. Dupixent helps prevent severe asthma attacks (exacerbations) and can improve your breathing. Dupixent may also help reduce the amount of oral corticosteroids you need while preventing severe asthma attacks and improving your breathing. Dupixent is not used to treat sudden breathing problems. Risks and side effect of Dupixent were discussed and reviewed with patient.\par \par problem 2: overweight\par Weight loss, exercise, and diet control were discussed and are highly encouraged. Treatment options were given such as, aqua therapy, and contacting a nutritionist. Recommended to use the elliptical, stationary bike, less use of treadmill. Mindful eating was explained to the patient Obesity is associated with worsening asthma, shortness of breath, and potential for cardiac disease, diabetes, and other underlying medical conditions. \par \par problem 3: poor breathing mechanics\par -Proper breathing techniques were reviewed with an emphasis of exhalation. Patient instructed to breath in for 1 second and out for four seconds. Patient was encouraged to not talk while walking.\par \par Problem 4: Anemia - s/p gynecological embolization\par -Hematin Anemia guard QD\par \par Problem 5: allergies/sinus\par -continue Qnasl 1 sniff BID \par -recommended to use "Navage" nasal rinse device \par -continue Astelin 0.15 1 sniff BID\par \par Environmental measures for allergies were encouraged including mattress and pillow cover, air purifier, and environmental controls.\par \par Problem 6:  GERD\par -continue Axid 300mg Before Bed time \par -continue Nexium 40 mg QD at breakfast \par -Rule of 2s: avoid eating too much, eating too late, eating too spicy, eating two hours before bed\par -Things to avoid including overeating, spicy foods, tight clothing, eating within three hours of bed, this list is not all inclusive. \par -For treatment of reflux, possible options discussed including diet control, H2 blockers, PPIs, as well as coating motility agents discussed as treatment options. Timing of meals and proximity of last meal to sleep were discussed. If symptoms persist, a formal gastrointestinal evaluation is needed. \par \par Problem 7: low vitamin D\par -Continue on supplemental vitamin D \par \par Problem 8: OSAS\par -continue to use CPAP every night, benefiting well \par - -Discussed the risks/associations with coronary artery disease, atrial fibrillation, arrhythmia, memory loss, issues with concentration, stroke risk, hypertension, nocturia, chronic reflux/Ferrara’s esophagus some but not all inclusive. Treatment options discussed including CPAP/BiPAP machine, oral appliance, ProVent therapy, Oxy-Aid by Respitec, new technologies, or positional sleep.\par \par problem 9: elevated eosinophil level (not elevate) (on steroids)\par -candidate for Nucala or Faserna\par \par -The safety and efficacy of Nucala was established in three double-blind, randomized, placebo-controlled trials in patients with severe asthma. Compared to a placebo, patients with severe asthma receiving Nucala had fewer exacerbation requiring hospitalization and/or emergency department visits, and a longer time to first exacerbation. In addition, patients with severe asthma receiving Nucala experienced greater reductions in their daily maintenance oral corticosteroid dose, while maintaining asthma control compared with patients receiving placebo. Treatment with Nucala did not result in a significant improvement in lung function, as measured by the volume of air exhaled by patients in one second. The most common side effects include: headache, injection site reactions, back pain, weakness, and fatigue; hypersensitivity reactions can occur within hours or days including swelling of the face, mouth, and tongue, fainting, dizziness, hives, breathing problems, and rash; herpes zoster infections have occurred. The drug is a monoclonal antibody that inhibits interleukin-5 which helps regular eosinophils, a type of white blood cell that contributes to asthma. The over-production of eosinophils can cause inflammation in the lungs, increasing the frequency of asthma attacks. Patients must also take other medications, including high dose inhaled corticosteroids and at least one additional asthma drug.\par \par problem 10: elevated IgE level\par -failed Xolair\par \par Problem 11: Health Maintenance/COVID19 Precautions \par - Clean your hands often. Wash your hands often with soap and water for at least 20 seconds, especially after blowing your nose, coughing, or sneezing, or having been in a public place.\par - If soap and water are not available, use a hand  that contains at least 60% alcohol.\par - To the extent possible, avoid touching high-touch surfaces in public places - elevator buttons, door handles, handrails, handshaking with people, etc. Use a tissue or your sleeve to cover your hand or finger if you must touch something.\par - Wash your hands after touching surfaces in public places.\par - Avoid touching your face, nose, eyes, etc.\par - Clean and disinfect your home to remove germs: practice routine cleaning of frequently touched surfaces (for example: tables, doorknobs, light switches, handles, desks, toilets, faucets, sinks & cell phones)\par - Avoid crowds, especially in poorly ventilated spaces. Your risk of exposure to respiratory viruses like COVID-19 may increase in crowded, closed-in settings with little air circulation if there are people in the crowd who are sick. All patients are recommended to practice social distancing and stay at least 6 feet away from others. \par - Avoid all non-essential travel including plane trips, and especially avoid embarking on cruise ships.\par -If COVID-19 is spreading in your community, take extra measures to put distance between yourself and other people to further reduce your risk of being exposed to this new virus.\par -Stay home as much as possible.\par - Consider ways of getting food brought to your house through family, social, or commercial networks\par -Be aware that the virus has been known to live in the air up to 3 hours post exposure, cardboard up to 24 hours post exposure, copper up to 4 hours post exposure, steel and plastic up to 2-3 days post exposure. Risk of transmission from these surfaces are affected by many variables.\par COVID-19 precautionary Immune Support Recommendations:\par -OTC Vitamin C 500mg BID \par -OTC Quercetin 250-500mg BID \par -OTC Zinc 75-100mg per day \par -OTC Melatonin 1 or 2mg a night \par -OTC Vitamin D 1-4000mg per day \par -OTC Tonic Water 8oz per day\par -Water 0.5-1 gallon per day\par Asthma and COVID19:\par You need to make sure your asthma is under control. This often requires the use of inhaled corticosteroids (and sometimes oral corticosteroids). Inhaled corticosteroids do not likely reduce your immune system’s ability to fight infections, but oral corticosteroids may. It is important to use the steps above to protect yourself to limit your exposure to any respiratory virus. \par \par problem 12: health maintenance \par - Taking a leave of absence due to asthmatic \par -recommended yearly flu shot after October 15 - 2018\par -recommended strep pneumonia vaccines: Prevnar-13 vaccine, followed by Pneumo vaccine 23 one year following\par -recommended early intervention for URIs\par -recommended regular osteoporosis evaluations\par -recommended early dermatological evaluations\par -recommended after the age of 50 to the age of 70, colonoscopy every 5 years \par Recommended that she see a Psychologist to help her with her get back to "herself" \par \par F/u in 3-4 months - SPI (FeNO not covered, do not do.)\par pt is encouraged to call or fax the office with any questions or concerns. \par Pt is to exercise and diet to promote a healthy weight. \par Explained to the pt in full detail with demonstrations how to use the inhalers and inhaler hygiene.

## 2020-07-08 NOTE — ADDENDUM
[FreeTextEntry1] : Documented by Shan Amaya acting as a scribe for Dr. Rodrigue Elizabeth on 07/08/2020.\par \par All medical record entries made by the Scribe were at my, Dr. Rodrigue Elizabeth's, direction and personally dictated by me on 07/08/2020. I have reviewed the chart and agree that the record accurately reflects my personal performance of the history, physical exam, assessment and plan. I have also personally directed, reviewed, and agree with the discharge instructions.

## 2020-07-08 NOTE — HISTORY OF PRESENT ILLNESS
[Home] : at home, [unfilled] , at the time of the visit. [Medical Office: ([facility])___] : at the medical office located in  [Verbal consent obtained from patient] : the patient, [unfilled] [FreeTextEntry1] : Ms. Edwards is a 44 year old female with a history of allergic rhinitis, severe persistent asthma, steroid dependent asthma, GERD, eosinophilia, nocturia, sleep apnea, SOB and vitamin D deficiency video calling to the office today, s/p release from hospital for Asthmatic Bronchitis. Chief complaint today is . \par \par -she notes generally feeling well\par -she notes minimal exercising due to muscle weakness\par -she notes active allergies during change of season\par -She notes Her bowels are regular \par -she notes anxiety over exposing herself to disease\par -she notes weight is up\par -she notes asthma has been controlled by Rx and tolerating well\par -she notes started Dupixent and tolerating well so far for about 2 months\par -she notes decrease in her intermittent wheeze\par -she notes compliance with CPAP\par -she notes trouble sleeping due to anxiety\par \par \par -today she denies any chest pain, chest pressure, diarrhea, constipation, dysphagia, dizziness, leg swelling, leg pain, itchy eyes, itchy ears, heartburn, reflux, or sour taste in the mouth.

## 2020-07-08 NOTE — REASON FOR VISIT
[Follow-Up] : a follow-up visit [FreeTextEntry1] : video call- allergic rhinitis, severe persistent asthma, GERD, eosinophilia, nocturia, sleep apnea, SOB and vitamin D deficiency, steroid dependent asthma, s/p hospitalization for asthmatic bronchitis

## 2020-07-12 NOTE — PROGRESS NOTE ADULT - SUBJECTIVE AND OBJECTIVE BOX
Interventional Radiology    Patient is a 43y old  Female who presents with a chief complaint of pelvic pain, pressure, back pain and menometorrhagia. Currently patient has no other complaints aside from chronic mass and bleeding symptoms from uterine fibroids.     HPI:  42 y/o F PMH chronic, severe asthma, TEODORA on CPAP, endometriosis, uterine fibroids, heavy menstruation with iron deficiency anemia s/p D&C, operative hysteroscopy 4/2019 planned for UFE on 7/10/19. (05 Jul 2019 16:24).      General:  No wt loss, fevers, chills, night sweats  Eyes:  Good vision, no reported pain  CV:  No pain, palpitations,   Resp:  No dyspnea, cough, tachypnea, wheezing  GI:  No pain, nausea, vomiting, diarrhea, constipation  :  No pain, bleeding, incontinence, nocturia  Muscle:  No pain, weakness  Neuro:  No weakness, tingling, memory problems  Psych:  No fatigue, insomnia, mood problems, depression  Endocrine:  No polyuria, polydypsia, cold/heat intolerance  Heme:  No petechiae, ecchymosis, easy bruisability  Skin:  No rash, tattoos, scars, edema    PAST MEDICAL & SURGICAL HISTORY:  Menorrhagia  Uterine fibroid  Hypothyroidism: resolved as per pt  H/O eosinophilia  H/O gastroesophageal reflux (GERD)  Obesity  TEODORA on CPAP  Asthma: chronic severe, followed by Dr. Elizabeth, 3-4 episodes of asthma bronchitis over the winter treated with po steroids, denies any exacerbation since 3/2019 or intubation hx  Status post D&C: hysteroscopy  S/P LASIK surgery  S/P ovarian cystectomy: left 2011      Allergies    Mobic (Rash)  shellfish (Hives)    Intolerances        MEDICATIONS  (STANDING):    MEDICATIONS  (PRN):        SOCIAL HISTORY:    FAMILY HISTORY:  Family history of asthma (Mother)  Family history of hypertension (Father, Mother)        PHYSICAL EXAM:  General:  Appears stated age, well-groomed, well-nourished, no distress  Abdomen:  Soft, non-tender, non-distended,   Neuro/Psych:  A &O x 3    LABS:      RADIOLOGY & ADDITIONAL STUDIES:      A/P: Interventional Radiology    Patient is a 43y old  Female who presents with a chief complaint of pelvic pain, pressure, back pain and menometorrhagia. Currently patient has no other complaints aside from chronic mass and bleeding symptoms from uterine fibroids.     HPI:  42 y/o F PMH chronic, severe asthma, TEODORA on CPAP, endometriosis, uterine fibroids, heavy menstruation with iron deficiency anemia s/p D&C, operative hysteroscopy 4/2019 planned for UFE on 7/10/19. (05 Jul 2019 16:24).      General:  No wt loss, fevers, chills, night sweats  Eyes:  Good vision, no reported pain  CV:  No pain, palpitations,   Resp:  No dyspnea, cough, tachypnea, wheezing  GI:  No pain, nausea, vomiting, diarrhea, constipation  :  No pain, bleeding, incontinence, nocturia  Muscle:  No pain, weakness  Neuro:  No weakness, tingling, memory problems  Psych:  No fatigue, insomnia, mood problems, depression  Endocrine:  No polyuria, polydypsia, cold/heat intolerance  Heme:  No petechiae, ecchymosis, easy bruisability  Skin:  No rash, tattoos, scars, edema    PAST MEDICAL & SURGICAL HISTORY:  Menorrhagia  Uterine fibroid  Hypothyroidism: resolved as per pt  H/O eosinophilia  H/O gastroesophageal reflux (GERD)  Obesity  TEODORA on CPAP  Asthma: chronic severe, followed by Dr. Elizabeth, 3-4 episodes of asthma bronchitis over the winter treated with po steroids, denies any exacerbation since 3/2019 or intubation hx  Status post D&C: hysteroscopy  S/P LASIK surgery  S/P ovarian cystectomy: left 2011      Allergies    Mobic (Rash)  shellfish (Hives)    Intolerances        MEDICATIONS  (STANDING):    MEDICATIONS  (PRN):        SOCIAL HISTORY:    FAMILY HISTORY:  Family history of asthma (Mother)  Family history of hypertension (Father, Mother)        PHYSICAL EXAM:  General:  Appears stated age, well-groomed, well-nourished, no distress  Abdomen:  Soft, non-tender, non-distended,   Neuro/Psych:  A &O x 3    LABS:  cr: 0.68. K+: 3.9 WBC: 7.04 Plt: 543    RADIOLOGY & ADDITIONAL STUDIES: Pelvic MRI with contrast on 12/13/2018 reviewed       A/P:  42 y/o female presenting for UFE  -Post procedure admit to medicine  -Will put in regimen of antiinflammatory medications, PCA pump for pain management and anti-emetics    Andrew Garcia MD  PGY-6, Interventional Radiology  Pager: [phone]

## 2020-08-04 ENCOUNTER — APPOINTMENT (OUTPATIENT)
Dept: INTERVENTIONAL RADIOLOGY/VASCULAR | Facility: CLINIC | Age: 45
End: 2020-08-04
Payer: COMMERCIAL

## 2020-08-04 VITALS — WEIGHT: 286 LBS | BODY MASS INDEX: 48.83 KG/M2 | HEIGHT: 64 IN

## 2020-08-04 DIAGNOSIS — Z87.42 PERSONAL HISTORY OF OTHER DISEASES OF THE FEMALE GENITAL TRACT: ICD-10-CM

## 2020-08-04 DIAGNOSIS — R10.2 PELVIC AND PERINEAL PAIN: ICD-10-CM

## 2020-08-04 DIAGNOSIS — R93.5 ABNORMAL FINDINGS ON DIAGNOSTIC IMAGING OF OTHER ABDOMINAL REGIONS, INCLUDING RETROPERITONEUM: ICD-10-CM

## 2020-08-04 DIAGNOSIS — D25.9 LEIOMYOMA OF UTERUS, UNSPECIFIED: ICD-10-CM

## 2020-08-04 DIAGNOSIS — N94.6 DYSMENORRHEA, UNSPECIFIED: ICD-10-CM

## 2020-08-04 DIAGNOSIS — N80.9 ENDOMETRIOSIS, UNSPECIFIED: ICD-10-CM

## 2020-08-04 PROCEDURE — 99214 OFFICE O/P EST MOD 30 MIN: CPT

## 2020-08-04 RX ORDER — NIZATIDINE 300 MG/1
300 CAPSULE ORAL
Qty: 90 | Refills: 4 | Status: DISCONTINUED | COMMUNITY
Start: 2020-02-19 | End: 2020-08-04

## 2020-08-04 RX ORDER — IPRATROPIUM BROMIDE AND ALBUTEROL SULFATE 2.5; .5 MG/3ML; MG/3ML
0.5-2.5 (3) SOLUTION RESPIRATORY (INHALATION) 4 TIMES DAILY
Qty: 120 | Refills: 3 | Status: DISCONTINUED | COMMUNITY
Start: 2019-12-03 | End: 2020-08-04

## 2020-08-04 RX ORDER — AMITRIPTYLINE HYDROCHLORIDE 10 MG/1
10 TABLET, FILM COATED ORAL 3 TIMES DAILY
Qty: 90 | Refills: 5 | Status: DISCONTINUED | COMMUNITY
Start: 2019-12-17 | End: 2020-08-04

## 2020-08-04 RX ORDER — OMEPRAZOLE 40 MG/1
40 CAPSULE, DELAYED RELEASE ORAL
Qty: 1 | Refills: 1 | Status: DISCONTINUED | COMMUNITY
Start: 2020-07-09 | End: 2020-08-04

## 2020-08-04 RX ORDER — BENZONATATE 200 MG/1
200 CAPSULE ORAL 3 TIMES DAILY
Qty: 90 | Refills: 0 | Status: DISCONTINUED | COMMUNITY
Start: 2019-12-03 | End: 2020-08-04

## 2020-08-04 RX ORDER — BUDESONIDE AND FORMOTEROL FUMARATE DIHYDRATE 160; 4.5 UG/1; UG/1
160-4.5 AEROSOL RESPIRATORY (INHALATION) TWICE DAILY
Qty: 10.2 | Refills: 3 | Status: DISCONTINUED | COMMUNITY
Start: 2017-12-20 | End: 2020-08-04

## 2020-08-04 RX ORDER — BUDESONIDE 0.5 MG/2ML
0.5 INHALANT ORAL TWICE DAILY
Qty: 2 | Refills: 0 | Status: DISCONTINUED | COMMUNITY
Start: 2019-12-03 | End: 2020-08-04

## 2020-08-04 RX ORDER — ERGOCALCIFEROL 1.25 MG/1
1.25 MG CAPSULE, LIQUID FILLED ORAL
Qty: 6 | Refills: 1 | Status: DISCONTINUED | COMMUNITY
Start: 2019-08-21 | End: 2020-08-04

## 2020-08-04 RX ORDER — FAMOTIDINE 40 MG/1
40 TABLET, FILM COATED ORAL
Qty: 30 | Refills: 1 | Status: DISCONTINUED | COMMUNITY
Start: 2019-12-03 | End: 2020-08-04

## 2020-08-04 RX ORDER — FORMOTEROL FUMARATE DIHYDRATE 20 UG/2ML
20 SOLUTION RESPIRATORY (INHALATION)
Qty: 1 | Refills: 1 | Status: DISCONTINUED | COMMUNITY
Start: 2019-12-13 | End: 2020-08-04

## 2020-08-04 RX ORDER — BUDESONIDE 0.5 MG/2ML
0.5 INHALANT ORAL
Qty: 2 | Refills: 1 | Status: DISCONTINUED | COMMUNITY
Start: 2019-03-18 | End: 2020-08-04

## 2020-08-04 RX ORDER — HYDROCODONE BITARTRATE AND HOMATROPINE METHYLBROMIDE 5; 1.5 MG/5ML; MG/5ML
5-1.5 SYRUP ORAL
Qty: 240 | Refills: 0 | Status: DISCONTINUED | COMMUNITY
Start: 2019-12-03 | End: 2020-08-04

## 2020-09-08 NOTE — ASU PREOP CHECKLIST - ISOLATION PRECAUTIONS
PT arrived via ems from home after sliding out of his shower chair. Pt denies LOC and states he may have hit his head. No signs of trauma or injury but family wants him checked out none

## 2020-10-01 ENCOUNTER — APPOINTMENT (OUTPATIENT)
Dept: PULMONOLOGY | Facility: CLINIC | Age: 45
End: 2020-10-01
Payer: COMMERCIAL

## 2020-10-01 ENCOUNTER — MED ADMIN CHARGE (OUTPATIENT)
Age: 45
End: 2020-10-01

## 2020-10-01 DIAGNOSIS — Z23 ENCOUNTER FOR IMMUNIZATION: ICD-10-CM

## 2020-10-01 PROCEDURE — 90682 RIV4 VACC RECOMBINANT DNA IM: CPT

## 2020-10-01 PROCEDURE — G0008: CPT

## 2020-11-13 ENCOUNTER — RESULT REVIEW (OUTPATIENT)
Age: 45
End: 2020-11-13

## 2021-02-17 ENCOUNTER — RESULT REVIEW (OUTPATIENT)
Age: 46
End: 2021-02-17

## 2021-04-08 ENCOUNTER — TRANSCRIPTION ENCOUNTER (OUTPATIENT)
Age: 46
End: 2021-04-08

## 2021-04-08 ENCOUNTER — NON-APPOINTMENT (OUTPATIENT)
Age: 46
End: 2021-04-08

## 2021-04-08 RX ORDER — DUPILUMAB 300 MG/2ML
300 INJECTION, SOLUTION SUBCUTANEOUS
Qty: 1 | Refills: 6 | Status: ACTIVE | COMMUNITY
Start: 2020-03-30 | End: 1900-01-01

## 2021-04-13 ENCOUNTER — TRANSCRIPTION ENCOUNTER (OUTPATIENT)
Age: 46
End: 2021-04-13

## 2021-04-27 ENCOUNTER — TRANSCRIPTION ENCOUNTER (OUTPATIENT)
Age: 46
End: 2021-04-27

## 2021-04-28 ENCOUNTER — TRANSCRIPTION ENCOUNTER (OUTPATIENT)
Age: 46
End: 2021-04-28

## 2021-05-04 ENCOUNTER — APPOINTMENT (OUTPATIENT)
Dept: PULMONOLOGY | Facility: CLINIC | Age: 46
End: 2021-05-04
Payer: COMMERCIAL

## 2021-05-04 VITALS — BODY MASS INDEX: 46.1 KG/M2 | WEIGHT: 270 LBS | HEIGHT: 64 IN

## 2021-05-04 DIAGNOSIS — Z01.812 ENCOUNTER FOR PREPROCEDURAL LABORATORY EXAMINATION: ICD-10-CM

## 2021-05-04 DIAGNOSIS — R09.82 POSTNASAL DRIP: ICD-10-CM

## 2021-05-04 PROCEDURE — 99214 OFFICE O/P EST MOD 30 MIN: CPT | Mod: 95

## 2021-05-04 RX ORDER — CARBINOXAMINE MALEATE 4 MG/1
4 TABLET ORAL 3 TIMES DAILY
Qty: 270 | Refills: 0 | Status: ACTIVE | COMMUNITY
Start: 2021-05-04 | End: 1900-01-01

## 2021-05-04 NOTE — HISTORY OF PRESENT ILLNESS
[Home] : at home, [unfilled] , at the time of the visit. [Medical Office: ([facility])___] : at the medical office located in  [Verbal consent obtained from patient] : the patient, [unfilled] [FreeTextEntry1] : Ms. Edwards is a 44 year old female with a history of allergic rhinitis, severe persistent asthma, steroid dependent asthma, GERD, eosinophilia, nocturia, sleep apnea, SOB and vitamin D deficiency video calling to the office today, s/p release from hospital for Asthmatic Bronchitis. Chief complaint today is allergies.\par - Her allergies have been very active\par - She has nasal congestion \par - She has sinus pressure \par - This is interfering with her sleep \par - She has some leg swelling\par  - Allergies have been effecting her breathing for the last week- she is having to use the nebulizer \par - She has been trying to exercise and diet (WW)\par - She has been vaccinated (Moderna)\par - She has been taking Dupixent for 10 months and has been doing very well with it \par - She is taking Flonase, nasal sprays OTC, Zyrtec, \par - No steroids for the last year \par - denies any headaches, nausea, vomiting, fever, chills, sweats, chest pain, chest pressure, diarrhea, constipation, dysphagia, dizziness,  leg pain, itchy eyes, itchy ears, heartburn, reflux, or sour taste in the mouth.

## 2021-05-04 NOTE — ASSESSMENT
[FreeTextEntry1] : Ms. Edwards is a 44 year old female with a history of severe persistent asthma,  steroid dependent asthma, allergies, GERD, OSAS, hypothyroidism, arthritis, anxiety, endometriosis, and obesity. She video calling to the office for improved on Dupixent approximately one year- currently plagued by allergies.\par \par Her SOB is due to: \par -anemia\par -asthma\par -obesity\par -poor mechanics of breathing \par -out of shape over weight\par \par Problem 1: Severe persistent asthma (poor control)\par -continue Symbicort 160 (2 puffs BID) \par -continue Qvar 80 (2 puffs BID) \par -continue Singulair 10 mg at bedtime \par - Continue DuoNeb via Hand held nebulizer prn, Q6H\par \par Asthma is believed to be caused by inherited (genetic) and environmental factor, but its exact cause is unknown. Asthma may be triggered by allergens, lung infections, or irritants in the air. Asthma triggers are different for each person \par -Inhaler technique reviewed as well as oral hygiene techniques reviewed with patient. Avoidance of cold air, extremes of temperature, rescue inhaler should be used before exercise. Order of medication reviewed with patient. Recommended use of a cool mist humidifier in the bedroom \par \par Problem 1A: Steroid Dependent Asthma\par - Off steroids for about 1 year \par - Dupixent in place approximately 1 year (pending appeal) \par Dupixent is a prescription medicine used with other asthma medicines for the maintenance treatment of moderate-to-severe asthma in people aged 12 years and older whose asthma is not controlled with their current asthma medicines. Dupixent helps prevent severe asthma attacks (exacerbations) and can improve your breathing. Dupixent may also help reduce the amount of oral corticosteroids you need while preventing severe asthma attacks and improving your breathing. Dupixent is not used to treat sudden breathing problems. Risks and side effect of Dupixent were discussed and reviewed with patient.\par \par problem 2: overweight\par Weight loss, exercise, and diet control were discussed and are highly encouraged. Treatment options were given such as, aqua therapy, and contacting a nutritionist. Recommended to use the elliptical, stationary bike, less use of treadmill. Mindful eating was explained to the patient Obesity is associated with worsening asthma, shortness of breath, and potential for cardiac disease, diabetes, and other underlying medical conditions. \par \par problem 3: poor breathing mechanics\par -Proper breathing techniques were reviewed with an emphasis of exhalation. Patient instructed to breath in for 1 second and out for four seconds. Patient was encouraged to not talk while walking.\par \par Problem 4: Anemia - s/p gynecological embolization\par -Hematin Anemia guard QD\par \par Problem 5: allergies/sinus\par -continue Qnasl 1 sniff BID or Flonase 1 sniff/nostril BID\par -recommended to use "Navage" nasal rinse device \par -continue Astelin 0.15 1 sniff BID\par - trial of Palgic 4 q8H\par \par Environmental measures for allergies were encouraged including mattress and pillow cover, air purifier, and environmental controls.\par \par Problem 6:  GERD\par -continue Axid 300mg Before Bed time \par -continue Nexium 40 mg QD at breakfast \par -Rule of 2s: avoid eating too much, eating too late, eating too spicy, eating two hours before bed\par -Things to avoid including overeating, spicy foods, tight clothing, eating within three hours of bed, this list is not all inclusive. \par -For treatment of reflux, possible options discussed including diet control, H2 blockers, PPIs, as well as coating motility agents discussed as treatment options. Timing of meals and proximity of last meal to sleep were discussed. If symptoms persist, a formal gastrointestinal evaluation is needed. \par \par Problem 7: low vitamin D\par -Continue on supplemental vitamin D \par \par Problem 8: OSAS\par -continue to use CPAP every night, benefiting well \par - -Discussed the risks/associations with coronary artery disease, atrial fibrillation, arrhythmia, memory loss, issues with concentration, stroke risk, hypertension, nocturia, chronic reflux/Ferrara’s esophagus some but not all inclusive. Treatment options discussed including CPAP/BiPAP machine, oral appliance, ProVent therapy, Oxy-Aid by Respitec, new technologies, or positional sleep.\par \par problem 9: elevated eosinophil level (not elevate) (on steroids)\par -candidate for Nucala or Faserna but using Dupixent\par \par -The safety and efficacy of Nucala was established in three double-blind, randomized, placebo-controlled trials in patients with severe asthma. Compared to a placebo, patients with severe asthma receiving Nucala had fewer exacerbation requiring hospitalization and/or emergency department visits, and a longer time to first exacerbation. In addition, patients with severe asthma receiving Nucala experienced greater reductions in their daily maintenance oral corticosteroid dose, while maintaining asthma control compared with patients receiving placebo. Treatment with Nucala did not result in a significant improvement in lung function, as measured by the volume of air exhaled by patients in one second. The most common side effects include: headache, injection site reactions, back pain, weakness, and fatigue; hypersensitivity reactions can occur within hours or days including swelling of the face, mouth, and tongue, fainting, dizziness, hives, breathing problems, and rash; herpes zoster infections have occurred. The drug is a monoclonal antibody that inhibits interleukin-5 which helps regular eosinophils, a type of white blood cell that contributes to asthma. The over-production of eosinophils can cause inflammation in the lungs, increasing the frequency of asthma attacks. Patients must also take other medications, including high dose inhaled corticosteroids and at least one additional asthma drug.\par \par problem 10: elevated IgE level\par -failed Xolair\par \par Problem 11: Health Maintenance/COVID19 Precautions (s/p Moderna x2 3/2021)\par - Clean your hands often. Wash your hands often with soap and water for at least 20 seconds, especially after blowing your nose, coughing, or sneezing, or having been in a public place.\par - If soap and water are not available, use a hand  that contains at least 60% alcohol.\par - To the extent possible, avoid touching high-touch surfaces in public places - elevator buttons, door handles, handrails, handshaking with people, etc. Use a tissue or your sleeve to cover your hand or finger if you must touch something.\par - Wash your hands after touching surfaces in public places.\par - Avoid touching your face, nose, eyes, etc.\par - Clean and disinfect your home to remove germs: practice routine cleaning of frequently touched surfaces (for example: tables, doorknobs, light switches, handles, desks, toilets, faucets, sinks & cell phones)\par - Avoid crowds, especially in poorly ventilated spaces. Your risk of exposure to respiratory viruses like COVID-19 may increase in crowded, closed-in settings with little air circulation if there are people in the crowd who are sick. All patients are recommended to practice social distancing and stay at least 6 feet away from others. \par - Avoid all non-essential travel including plane trips, and especially avoid embarking on cruise ships.\par -If COVID-19 is spreading in your community, take extra measures to put distance between yourself and other people to further reduce your risk of being exposed to this new virus.\par -Stay home as much as possible.\par - Consider ways of getting food brought to your house through family, social, or commercial networks\par -Be aware that the virus has been known to live in the air up to 3 hours post exposure, cardboard up to 24 hours post exposure, copper up to 4 hours post exposure, steel and plastic up to 2-3 days post exposure. Risk of transmission from these surfaces are affected by many variables.\par COVID-19 precautionary Immune Support Recommendations:\par -OTC Vitamin C 500mg BID \par -OTC Quercetin 250-500mg BID \par -OTC Zinc 75-100mg per day \par -OTC Melatonin 1 or 2mg a night \par -OTC Vitamin D 1-4000mg per day \par -OTC Tonic Water 8oz per day\par -Water 0.5-1 gallon per day\par Asthma and COVID19:\par You need to make sure your asthma is under control. This often requires the use of inhaled corticosteroids (and sometimes oral corticosteroids). Inhaled corticosteroids do not likely reduce your immune system’s ability to fight infections, but oral corticosteroids may. It is important to use the steps above to protect yourself to limit your exposure to any respiratory virus. \par \par problem 12: health maintenance \par - Taking a leave of absence due to asthmatic \par -recommended yearly flu shot after October 15 - 2018\par -recommended strep pneumonia vaccines: Prevnar-13 vaccine, followed by Pneumo vaccine 23 one year following\par -recommended early intervention for URIs\par -recommended regular osteoporosis evaluations\par -recommended early dermatological evaluations\par -recommended after the age of 50 to the age of 70, colonoscopy every 5 years \par Recommended that she see a Psychologist to help her with her get back to "herself" \par \par F/u in 3-4 months - SPI (FeNO not covered, do not do.)\par pt is encouraged to call or fax the office with any questions or concerns. \par Pt is to exercise and diet to promote a healthy weight. \par Explained to the pt in full detail with demonstrations how to use the inhalers and inhaler hygiene.

## 2021-05-04 NOTE — ADDENDUM
[FreeTextEntry1] : Documented by Elizabeth Duggan acting as a scribe for Dr. Rodrigue Elizabeth on (05/04/2021).\par \par All medical record entries made by the Scribe were at my, Dr. Rodrigue Elizabeth's, direction and personally dictated by me on (05/04/2021). I have reviewed the chart and agree that the record accurately reflects my personal performance of the history, physical exam, assessment and plan. I have also personally directed, reviewed, and agree with the discharge instructions. \par \par \par

## 2021-05-04 NOTE — REVIEW OF SYSTEMS
[Nasal Congestion] : nasal congestion [Sinus Problems] : sinus problems [SOB on Exertion] : sob on exertion [Seasonal Allergies] : seasonal allergies [Negative] : Endocrine

## 2021-05-05 ENCOUNTER — TRANSCRIPTION ENCOUNTER (OUTPATIENT)
Age: 46
End: 2021-05-05

## 2021-05-05 RX ORDER — AZELASTINE HYDROCHLORIDE 137 UG/1
0.1 SPRAY, METERED NASAL TWICE DAILY
Qty: 3 | Refills: 1 | Status: ACTIVE | COMMUNITY
Start: 2019-12-03 | End: 1900-01-01

## 2021-05-05 RX ORDER — ERGOCALCIFEROL 1.25 MG/1
1.25 MG CAPSULE, LIQUID FILLED ORAL
Qty: 4 | Refills: 2 | Status: ACTIVE | COMMUNITY
Start: 2020-07-08 | End: 1900-01-01

## 2021-06-23 ENCOUNTER — TRANSCRIPTION ENCOUNTER (OUTPATIENT)
Age: 46
End: 2021-06-23

## 2021-06-25 ENCOUNTER — TRANSCRIPTION ENCOUNTER (OUTPATIENT)
Age: 46
End: 2021-06-25

## 2021-08-25 PROBLEM — Z01.812 PRE-PROCEDURAL LABORATORY EXAMINATION: Status: ACTIVE | Noted: 2021-08-25

## 2021-09-03 ENCOUNTER — TRANSCRIPTION ENCOUNTER (OUTPATIENT)
Age: 46
End: 2021-09-03

## 2021-09-08 ENCOUNTER — APPOINTMENT (OUTPATIENT)
Dept: PULMONOLOGY | Facility: CLINIC | Age: 46
End: 2021-09-08
Payer: COMMERCIAL

## 2021-09-08 VITALS
OXYGEN SATURATION: 98 % | TEMPERATURE: 98 F | HEART RATE: 76 BPM | WEIGHT: 279 LBS | HEIGHT: 64 IN | RESPIRATION RATE: 17 BRPM | DIASTOLIC BLOOD PRESSURE: 80 MMHG | BODY MASS INDEX: 47.63 KG/M2 | SYSTOLIC BLOOD PRESSURE: 118 MMHG

## 2021-09-08 DIAGNOSIS — E55.9 VITAMIN D DEFICIENCY, UNSPECIFIED: ICD-10-CM

## 2021-09-08 DIAGNOSIS — K21.9 GASTRO-ESOPHAGEAL REFLUX DISEASE W/OUT ESOPHAGITIS: ICD-10-CM

## 2021-09-08 DIAGNOSIS — R06.02 SHORTNESS OF BREATH: ICD-10-CM

## 2021-09-08 DIAGNOSIS — J45.909 UNSPECIFIED ASTHMA, UNCOMPLICATED: ICD-10-CM

## 2021-09-08 DIAGNOSIS — E66.01 MORBID (SEVERE) OBESITY DUE TO EXCESS CALORIES: ICD-10-CM

## 2021-09-08 DIAGNOSIS — Z86.2 PERSONAL HISTORY OF DISEASES OF THE BLOOD AND BLOOD-FORMING ORGANS AND CERTAIN DISORDERS INVOLVING THE IMMUNE MECHANISM: ICD-10-CM

## 2021-09-08 DIAGNOSIS — J30.9 ALLERGIC RHINITIS, UNSPECIFIED: ICD-10-CM

## 2021-09-08 PROCEDURE — 99214 OFFICE O/P EST MOD 30 MIN: CPT | Mod: 25

## 2021-09-08 PROCEDURE — 94618 PULMONARY STRESS TESTING: CPT

## 2021-09-08 RX ORDER — NEOMYCIN AND POLYMYXIN B SULFATES AND HYDROCORTISONE OTIC 10; 3.5; 1 MG/ML; MG/ML; [USP'U]/ML
3.5-10000-1 SUSPENSION AURICULAR (OTIC)
Qty: 10 | Refills: 0 | Status: DISCONTINUED | COMMUNITY
Start: 2021-08-16

## 2021-09-17 ENCOUNTER — TRANSCRIPTION ENCOUNTER (OUTPATIENT)
Age: 46
End: 2021-09-17

## 2021-09-27 NOTE — ASSESSMENT
[FreeTextEntry1] : Ms. Edwards is a 45 year old female with a history of severe persistent asthma,  steroid dependent asthma, allergies, GERD, OSAS, hypothyroidism, arthritis, anxiety, endometriosis, and obesity. She video calling to the office for improved on Dupixent approximately one year- controlled - her #1 issue is weight\par \par Her SOB is due to: \par -anemia\par -asthma\par -obesity\par -poor mechanics of breathing \par -out of shape over weight\par \par Problem 1: Severe persistent asthma (poor control)\par -continue Symbicort 160 (2 puffs BID) \par -continue Qvar 80 (2 puffs BID) \par -continue Singulair 10 mg at bedtime \par - Continue DuoNeb via Hand held nebulizer prn, Q6H\par \par Asthma is believed to be caused by inherited (genetic) and environmental factor, but its exact cause is unknown. Asthma may be triggered by allergens, lung infections, or irritants in the air. Asthma triggers are different for each person \par -Inhaler technique reviewed as well as oral hygiene techniques reviewed with patient. Avoidance of cold air, extremes of temperature, rescue inhaler should be used before exercise. Order of medication reviewed with patient. Recommended use of a cool mist humidifier in the bedroom \par \par Problem 1A: Steroid Dependent Asthma\par - Off steroids for over 1 year \par - Dupixent 300mg (started with samples in Sept 2020 and has been well maintained as she has been off steriods ever since starting the Dupixent)\par Dupixent is a prescription medicine used with other asthma medicines for the maintenance treatment of moderate-to-severe asthma in people aged 12 years and older whose asthma is not controlled with their current asthma medicines. Dupixent helps prevent severe asthma attacks (exacerbations) and can improve your breathing. Dupixent may also help reduce the amount of oral corticosteroids you need while preventing severe asthma attacks and improving your breathing. Dupixent is not used to treat sudden breathing problems. Risks and side effect of Dupixent were discussed and reviewed with patient.\par \par problem 2: overweight\par -Recommend Muniq Supplement \par -Recommend Food Fuels \par Weight loss, exercise, and diet control were discussed and are highly encouraged. Treatment options were given such as, aqua therapy, and contacting a nutritionist. Recommended to use the elliptical, stationary bike, less use of treadmill. Mindful eating was explained to the patient Obesity is associated with worsening asthma, shortness of breath, and potential for cardiac disease, diabetes, and other underlying medical conditions. \par \par problem 3: poor breathing mechanics\par -Recommended Wimhof and Buteyko breathing techniques \par -Proper breathing techniques were reviewed with an emphasis of exhalation. Patient instructed to breath in for 1 second and out for four seconds. Patient was encouraged to not talk while walking.\par \par Problem 4: Anemia - s/p gynecological embolization\par -Hematin Anemia guard QD\par \par Problem 5: allergies/sinus\par -continue Qnasl 1 sniff BID or Flonase 1 sniff/nostril BID\par -recommended to use "Navage" nasal rinse device \par -continue Astelin 0.15 1 sniff BID\par - trial of Palgic 4 q8H\par \par Environmental measures for allergies were encouraged including mattress and pillow cover, air purifier, and environmental controls.\par \par Problem 6:  GERD\par -continue Axid 300mg Before Bed time \par -continue Nexium 40 mg QD at breakfast \par -Rule of 2s: avoid eating too much, eating too late, eating too spicy, eating two hours before bed\par -Things to avoid including overeating, spicy foods, tight clothing, eating within three hours of bed, this list is not all inclusive. \par -For treatment of reflux, possible options discussed including diet control, H2 blockers, PPIs, as well as coating motility agents discussed as treatment options. Timing of meals and proximity of last meal to sleep were discussed. If symptoms persist, a formal gastrointestinal evaluation is needed. \par \par Problem 7: low vitamin D\par -Continue on supplemental vitamin D \par \par Problem 8: OSAS\par -Complete split night study \par -continue to use CPAP every night, benefiting well \par - -Discussed the risks/associations with coronary artery disease, atrial fibrillation, arrhythmia, memory loss, issues with concentration, stroke risk, hypertension, nocturia, chronic reflux/Ferrara’s esophagus some but not all inclusive. Treatment options discussed including CPAP/BiPAP machine, oral appliance, ProVent therapy, Oxy-Aid by Respitec, new technologies, or positional sleep.\par \par problem 9: elevated eosinophil level (not elevate) (on steroids)\par -candidate for Nucala or Faserna but using Dupixent\par \par -The safety and efficacy of Nucala was established in three double-blind, randomized, placebo-controlled trials in patients with severe asthma. Compared to a placebo, patients with severe asthma receiving Nucala had fewer exacerbation requiring hospitalization and/or emergency department visits, and a longer time to first exacerbation. In addition, patients with severe asthma receiving Nucala experienced greater reductions in their daily maintenance oral corticosteroid dose, while maintaining asthma control compared with patients receiving placebo. Treatment with Nucala did not result in a significant improvement in lung function, as measured by the volume of air exhaled by patients in one second. The most common side effects include: headache, injection site reactions, back pain, weakness, and fatigue; hypersensitivity reactions can occur within hours or days including swelling of the face, mouth, and tongue, fainting, dizziness, hives, breathing problems, and rash; herpes zoster infections have occurred. The drug is a monoclonal antibody that inhibits interleukin-5 which helps regular eosinophils, a type of white blood cell that contributes to asthma. The over-production of eosinophils can cause inflammation in the lungs, increasing the frequency of asthma attacks. Patients must also take other medications, including high dose inhaled corticosteroids and at least one additional asthma drug.\par \par problem 10: elevated IgE level\par -failed Xolair\par \par Problem 11: Health Maintenance/COVID19 Precautions (s/p Moderna x2 3/2021)\par - Clean your hands often. Wash your hands often with soap and water for at least 20 seconds, especially after blowing your nose, coughing, or sneezing, or having been in a public place.\par - If soap and water are not available, use a hand  that contains at least 60% alcohol.\par - To the extent possible, avoid touching high-touch surfaces in public places - elevator buttons, door handles, handrails, handshaking with people, etc. Use a tissue or your sleeve to cover your hand or finger if you must touch something.\par - Wash your hands after touching surfaces in public places.\par - Avoid touching your face, nose, eyes, etc.\par - Clean and disinfect your home to remove germs: practice routine cleaning of frequently touched surfaces (for example: tables, doorknobs, light switches, handles, desks, toilets, faucets, sinks & cell phones)\par - Avoid crowds, especially in poorly ventilated spaces. Your risk of exposure to respiratory viruses like COVID-19 may increase in crowded, closed-in settings with little air circulation if there are people in the crowd who are sick. All patients are recommended to practice social distancing and stay at least 6 feet away from others. \par - Avoid all non-essential travel including plane trips, and especially avoid embarking on cruise ships.\par -If COVID-19 is spreading in your community, take extra measures to put distance between yourself and other people to further reduce your risk of being exposed to this new virus.\par -Stay home as much as possible.\par - Consider ways of getting food brought to your house through family, social, or commercial networks\par -Be aware that the virus has been known to live in the air up to 3 hours post exposure, cardboard up to 24 hours post exposure, copper up to 4 hours post exposure, steel and plastic up to 2-3 days post exposure. Risk of transmission from these surfaces are affected by many variables.\par COVID-19 precautionary Immune Support Recommendations:\par -OTC Vitamin C 500mg BID \par -OTC Quercetin 250-500mg BID \par -OTC Zinc 75-100mg per day \par -OTC Melatonin 1 or 2mg a night \par -OTC Vitamin D 1-4000mg per day \par -OTC Tonic Water 8oz per day\par -Water 0.5-1 gallon per day\par Asthma and COVID19:\par You need to make sure your asthma is under control. This often requires the use of inhaled corticosteroids (and sometimes oral corticosteroids). Inhaled corticosteroids do not likely reduce your immune system’s ability to fight infections, but oral corticosteroids may. It is important to use the steps above to protect yourself to limit your exposure to any respiratory virus. \par \par problem 12: health maintenance \par - Taking a leave of absence due to asthmatic \par -recommended yearly flu shot after October 15 - 2018\par -recommended strep pneumonia vaccines: Prevnar-13 vaccine, followed by Pneumo vaccine 23 one year following\par -recommended early intervention for URIs\par -recommended regular osteoporosis evaluations\par -recommended early dermatological evaluations\par -recommended after the age of 50 to the age of 70, colonoscopy every 5 years \par Recommended that she see a Psychologist to help her with her get back to "herself" \par \par F/u in 3-4 months - SPI (FeNO not covered, do not do.)\par pt is encouraged to call or fax the office with any questions or concerns. \par Pt is to exercise and diet to promote a healthy weight. \par Explained to the pt in full detail with demonstrations how to use the inhalers and inhaler hygiene.

## 2021-09-27 NOTE — PHYSICAL EXAM
[No Acute Distress] : no acute distress [Well Nourished] : well nourished [Normal Appearance] : normal appearance [Well Groomed] : well groomed [No Deformities] : no deformities [Well Developed] : well developed [III] : Mallampati Class: III [TextBox_68] : I:E 1:3; Clear  [TextBox_105] : trace SURESH arana

## 2021-09-27 NOTE — PROCEDURE
[FreeTextEntry1] : 6 minute walk test reveals a low saturation of % with moderate dyspnea or fatigue; walked 195.6 meters\par \par -Images and procedures reviewed in detail and discussed with patient.

## 2021-09-27 NOTE — HISTORY OF PRESENT ILLNESS
[FreeTextEntry1] : Ms. Edwards is a 45 year old female with a history of allergic rhinitis, severe persistent asthma, steroid dependent asthma, GERD, eosinophilia, nocturia, sleep apnea, SOB and vitamin D deficiency coming into the office today, s/p release from hospital for Asthmatic Bronchitis. Chief complaint today is \par -She notes doing well in general, improved since her previous visit \par -She notes recently moving to [place], and has increased her activity level \par -She notes losing weight purposefully \par -She notes her CPAP machine has been recalled, and notes getting her machine in 2015\par -She notes using her CPAP machine religiously, and is well tolerated \par -She notes s/o iron infusions which improved her energy level \par -she notes using Dupixent which has improved her breathing \par -She notes her sinuses are clear\par -no new medications, vitamins, or supplements \par - S/p Covid 19 vaccine (Moderna) x2, with a slight fever after her second dose \par -She denies needing DuoNeb inhaler \par \par - patient denies any headaches, nausea, vomiting, fever, chills, sweats, chest pain, chest pressure, palpitations, coughing, wheezing, fatigue, diarrhea, constipation, dysphagia, myalgias, dizziness, leg swelling, leg pain, itchy eyes, itchy ears, heartburn, reflux or sour taste in the mouth

## 2021-09-27 NOTE — ADDENDUM
[FreeTextEntry1] : Documented by Pavel Wellington acting as a scribe for Dr. Rodrigue Elizabeth on (09/08/2021).\par \par All medical record entries made by the Scribe were at my, Dr. Rodrigue Elizabeth's, direction and personally dictated by me on (09/08/2021). I have reviewed the chart and agree that the record accurately reflects my personal performance of the history, physical exam, assessment and plan. I have also personally directed, reviewed, and agree with the discharge instructions.\par  \par

## 2021-10-22 DIAGNOSIS — Z11.59 ENCOUNTER FOR SCREENING FOR OTHER VIRAL DISEASES: ICD-10-CM

## 2021-10-23 ENCOUNTER — EMERGENCY (EMERGENCY)
Facility: HOSPITAL | Age: 46
LOS: 1 days | Discharge: HOME | End: 2021-10-23
Attending: EMERGENCY MEDICINE | Admitting: EMERGENCY MEDICINE
Payer: COMMERCIAL

## 2021-10-23 VITALS
SYSTOLIC BLOOD PRESSURE: 115 MMHG | RESPIRATION RATE: 20 BRPM | HEART RATE: 94 BPM | OXYGEN SATURATION: 99 % | DIASTOLIC BLOOD PRESSURE: 73 MMHG | TEMPERATURE: 98 F

## 2021-10-23 VITALS
HEART RATE: 101 BPM | SYSTOLIC BLOOD PRESSURE: 162 MMHG | RESPIRATION RATE: 25 BRPM | WEIGHT: 259.93 LBS | OXYGEN SATURATION: 99 % | HEIGHT: 64 IN | TEMPERATURE: 99 F | DIASTOLIC BLOOD PRESSURE: 84 MMHG

## 2021-10-23 DIAGNOSIS — Z20.822 CONTACT WITH AND (SUSPECTED) EXPOSURE TO COVID-19: ICD-10-CM

## 2021-10-23 DIAGNOSIS — J45.909 UNSPECIFIED ASTHMA, UNCOMPLICATED: ICD-10-CM

## 2021-10-23 DIAGNOSIS — E03.9 HYPOTHYROIDISM, UNSPECIFIED: ICD-10-CM

## 2021-10-23 DIAGNOSIS — E66.9 OBESITY, UNSPECIFIED: ICD-10-CM

## 2021-10-23 DIAGNOSIS — Z98.890 OTHER SPECIFIED POSTPROCEDURAL STATES: Chronic | ICD-10-CM

## 2021-10-23 DIAGNOSIS — Z88.6 ALLERGY STATUS TO ANALGESIC AGENT: ICD-10-CM

## 2021-10-23 DIAGNOSIS — Z87.42 PERSONAL HISTORY OF OTHER DISEASES OF THE FEMALE GENITAL TRACT: ICD-10-CM

## 2021-10-23 DIAGNOSIS — G47.33 OBSTRUCTIVE SLEEP APNEA (ADULT) (PEDIATRIC): ICD-10-CM

## 2021-10-23 DIAGNOSIS — K21.9 GASTRO-ESOPHAGEAL REFLUX DISEASE WITHOUT ESOPHAGITIS: ICD-10-CM

## 2021-10-23 DIAGNOSIS — R05.9 COUGH, UNSPECIFIED: ICD-10-CM

## 2021-10-23 DIAGNOSIS — R06.02 SHORTNESS OF BREATH: ICD-10-CM

## 2021-10-23 DIAGNOSIS — Z91.013 ALLERGY TO SEAFOOD: ICD-10-CM

## 2021-10-23 LAB
ALBUMIN SERPL ELPH-MCNC: 4.6 G/DL — SIGNIFICANT CHANGE UP (ref 3.3–5)
ALP SERPL-CCNC: 73 U/L — SIGNIFICANT CHANGE UP (ref 40–120)
ALT FLD-CCNC: 38 U/L — SIGNIFICANT CHANGE UP (ref 10–45)
ANION GAP SERPL CALC-SCNC: 10 MMOL/L — SIGNIFICANT CHANGE UP (ref 5–17)
AST SERPL-CCNC: 25 U/L — SIGNIFICANT CHANGE UP (ref 10–40)
BASOPHILS # BLD AUTO: 0.05 K/UL — SIGNIFICANT CHANGE UP (ref 0–0.2)
BASOPHILS NFR BLD AUTO: 0.5 % — SIGNIFICANT CHANGE UP (ref 0–2)
BILIRUB SERPL-MCNC: 0.2 MG/DL — SIGNIFICANT CHANGE UP (ref 0.2–1.2)
BUN SERPL-MCNC: 10 MG/DL — SIGNIFICANT CHANGE UP (ref 7–23)
CALCIUM SERPL-MCNC: 9.3 MG/DL — SIGNIFICANT CHANGE UP (ref 8.4–10.5)
CHLORIDE SERPL-SCNC: 104 MMOL/L — SIGNIFICANT CHANGE UP (ref 96–108)
CO2 SERPL-SCNC: 26 MMOL/L — SIGNIFICANT CHANGE UP (ref 22–31)
CREAT SERPL-MCNC: 0.65 MG/DL — SIGNIFICANT CHANGE UP (ref 0.5–1.3)
EOSINOPHIL # BLD AUTO: 0.63 K/UL — HIGH (ref 0–0.5)
EOSINOPHIL NFR BLD AUTO: 6.8 % — HIGH (ref 0–6)
GLUCOSE SERPL-MCNC: 108 MG/DL — HIGH (ref 70–99)
HCT VFR BLD CALC: 38.1 % — SIGNIFICANT CHANGE UP (ref 34.5–45)
HGB BLD-MCNC: 12.2 G/DL — SIGNIFICANT CHANGE UP (ref 11.5–15.5)
IMM GRANULOCYTES NFR BLD AUTO: 0.9 % — SIGNIFICANT CHANGE UP (ref 0–1.5)
LYMPHOCYTES # BLD AUTO: 1.54 K/UL — SIGNIFICANT CHANGE UP (ref 1–3.3)
LYMPHOCYTES # BLD AUTO: 16.5 % — SIGNIFICANT CHANGE UP (ref 13–44)
MCHC RBC-ENTMCNC: 29 PG — SIGNIFICANT CHANGE UP (ref 27–34)
MCHC RBC-ENTMCNC: 32 GM/DL — SIGNIFICANT CHANGE UP (ref 32–36)
MCV RBC AUTO: 90.5 FL — SIGNIFICANT CHANGE UP (ref 80–100)
MONOCYTES # BLD AUTO: 0.53 K/UL — SIGNIFICANT CHANGE UP (ref 0–0.9)
MONOCYTES NFR BLD AUTO: 5.7 % — SIGNIFICANT CHANGE UP (ref 2–14)
NEUTROPHILS # BLD AUTO: 6.49 K/UL — SIGNIFICANT CHANGE UP (ref 1.8–7.4)
NEUTROPHILS NFR BLD AUTO: 69.6 % — SIGNIFICANT CHANGE UP (ref 43–77)
NRBC # BLD: 0 /100 WBCS — SIGNIFICANT CHANGE UP (ref 0–0)
PLATELET # BLD AUTO: 378 K/UL — SIGNIFICANT CHANGE UP (ref 150–400)
POTASSIUM SERPL-MCNC: 3.8 MMOL/L — SIGNIFICANT CHANGE UP (ref 3.5–5.3)
POTASSIUM SERPL-SCNC: 3.8 MMOL/L — SIGNIFICANT CHANGE UP (ref 3.5–5.3)
PROT SERPL-MCNC: 7.8 G/DL — SIGNIFICANT CHANGE UP (ref 6–8.3)
RAPID RVP RESULT: DETECTED
RBC # BLD: 4.21 M/UL — SIGNIFICANT CHANGE UP (ref 3.8–5.2)
RBC # FLD: 12.8 % — SIGNIFICANT CHANGE UP (ref 10.3–14.5)
RV+EV RNA SPEC QL NAA+PROBE: DETECTED
SARS-COV-2 RNA SPEC QL NAA+PROBE: SIGNIFICANT CHANGE UP
SODIUM SERPL-SCNC: 140 MMOL/L — SIGNIFICANT CHANGE UP (ref 135–145)
WBC # BLD: 9.32 K/UL — SIGNIFICANT CHANGE UP (ref 3.8–10.5)
WBC # FLD AUTO: 9.32 K/UL — SIGNIFICANT CHANGE UP (ref 3.8–10.5)

## 2021-10-23 PROCEDURE — 36415 COLL VENOUS BLD VENIPUNCTURE: CPT

## 2021-10-23 PROCEDURE — 85025 COMPLETE CBC W/AUTO DIFF WBC: CPT

## 2021-10-23 PROCEDURE — 80053 COMPREHEN METABOLIC PANEL: CPT

## 2021-10-23 PROCEDURE — 0225U NFCT DS DNA&RNA 21 SARSCOV2: CPT

## 2021-10-23 PROCEDURE — 71045 X-RAY EXAM CHEST 1 VIEW: CPT | Mod: 26

## 2021-10-23 PROCEDURE — 71045 X-RAY EXAM CHEST 1 VIEW: CPT

## 2021-10-23 PROCEDURE — 94640 AIRWAY INHALATION TREATMENT: CPT

## 2021-10-23 PROCEDURE — 99285 EMERGENCY DEPT VISIT HI MDM: CPT | Mod: 25

## 2021-10-23 PROCEDURE — 96374 THER/PROPH/DIAG INJ IV PUSH: CPT

## 2021-10-23 PROCEDURE — 93005 ELECTROCARDIOGRAM TRACING: CPT

## 2021-10-23 PROCEDURE — 93010 ELECTROCARDIOGRAM REPORT: CPT

## 2021-10-23 RX ORDER — IPRATROPIUM/ALBUTEROL SULFATE 18-103MCG
3 AEROSOL WITH ADAPTER (GRAM) INHALATION
Refills: 0 | Status: COMPLETED | OUTPATIENT
Start: 2021-10-23 | End: 2021-10-23

## 2021-10-23 RX ORDER — PROMETHAZINE HCL/CODEINE
5 SYRUP ORAL
Qty: 75 | Refills: 0
Start: 2021-10-23

## 2021-10-23 RX ADMIN — Medication 125 MILLIGRAM(S): at 09:33

## 2021-10-23 RX ADMIN — Medication 3 MILLILITER(S): at 09:33

## 2021-10-23 RX ADMIN — Medication 3 MILLILITER(S): at 10:15

## 2021-10-23 RX ADMIN — Medication 3 MILLILITER(S): at 09:56

## 2021-10-23 NOTE — ED PROVIDER NOTE - PROGRESS NOTE DETAILS
EKG no ischemia. CXR wnl, no infiltrates.  covid/rvp sent.  labs wnl. pt feeling much better w/ improved air movement and diminished wheezing after nebs/steroids/hycodan, no hypoxia with ambulation.  will dc w/ PO steroids and antitussive.  instructed to call back for covid/rvp results.  pdiscussed strict return parameters

## 2021-10-23 NOTE — ED ADULT TRIAGE NOTE - CHIEF COMPLAINT QUOTE
pt arriving to ED for c/c SOB and cough which has progressively worsened since thursday. pt states she had a low grade fever ~100.0 on thursday, has been taking Advil. Endorsing mild chest tightness. Had covid test today, does not know results yet. hx asthma w/ prior hospitalizations. pt is covid vaccinated x 2 w/ Moderna.

## 2021-10-23 NOTE — ED ADULT NURSE NOTE - CARDIO ASSESSMENT
--- Bactrim Pregnancy And Lactation Text: This medication is Pregnancy Category D and is known to cause fetal risk.  It is also excreted in breast milk.

## 2021-10-23 NOTE — ED PROVIDER NOTE - OBJECTIVE STATEMENT
46 F pmh asthma (last hospitalization 2020- no intubations/ICU admissions), TEODORA on CPAP at night p/w cough and sob x 3 days.  She reports dry cough progressively worsening w/ chest tightness and sob.  She has been taking duoneb and symbicort with mild relief.  SOB worse this morning. Pulmonologist (Dr. Elizabeth) told her to come to ED this morning, had covid test at outpt lab but did not yet get result.  Also w/ low grade temp Thursday night, around 100  Denies headache, dizziness, fainting, chest pain, palpitations, abd pain, nvd, urinary sxs, leg pain/swelling, recent travel/immobilization, h/o dvt/pe, use of hormones, hemoptysis, trauma

## 2021-10-23 NOTE — ED PROVIDER NOTE - CLINICAL SUMMARY MEDICAL DECISION MAKING FREE TEXT BOX
46 F pmh asthma (last hospitalization 2020- no intubations/ICU admissions), TEODORA on CPAP at night p/w cough and sob x 3 days. 46 F pmh asthma (last hospitalization 2020- no intubations/ICU admissions), TEODORA on CPAP at night p/w cough and sob x 3 days.  pt afebrile, tired but nontoxic appearing, hrrr, diminished breath sounds throughout w/ exp wheeze anteriorly, no retractions, no LE edema/calf ttp.  likely URI w/ asthma exacerbation, possible PNA,  PERC neg.  will obtain labs, EKG, CXR give nebs/steroids and antitussive and reassess 46 F pmh asthma (last hospitalization 2020- no intubations/ICU admissions), TEODORA on CPAP at night p/w cough and sob x 3 days.  pt afebrile, ambulatory sat 97%, tired but nontoxic appearing, hrrr, diminished breath sounds throughout w/ exp wheeze anteriorly, no retractions, no LE edema/calf ttp.  likely URI w/ asthma exacerbation, possible PNA,  PERC neg.  will obtain labs, EKG, CXR give nebs/steroids and antitussive and reassess

## 2021-10-23 NOTE — ED ADULT NURSE NOTE - NSICDXFAMILYHX_GEN_ALL_CORE_FT
FAMILY HISTORY:  Father  Still living? Unknown  Family history of asthma, Age at diagnosis: Age Unknown    Mother  Still living? Unknown  Family history of asthma, Age at diagnosis: Age Unknown    Sibling  Still living? Unknown  Family history of asthma, Age at diagnosis: Age Unknown

## 2021-10-23 NOTE — ED PROVIDER NOTE - ATTENDING CONTRIBUTION TO CARE
I discussed the plan of care of the patient directly with the PA and examined the patient while in the Emergency Department. I agree with the HPI and PE as documented by the PA.  Pt is a 47yo f, h/o sleep apnea, asthma, who p/w 3d of cough, sob, chest tightness. Using symbicort and alb nebs with no improvement. + low grade temp few days ago. Is fully vaccinated for COVID. Afebrile. HDS. PE as above. Diminished bs and mild exp wheezes b/l, no rales. + s1, s2, rrr. Ambulatory o2 sat 97% CXR neg for i/e/chf. Plan for steroids, combinebs, hycodan. Will re-assess.

## 2021-10-23 NOTE — ED ADULT NURSE NOTE - NSICDXPASTMEDICALHX_GEN_ALL_CORE_FT
Valve deployed per 's instructions.     PAST MEDICAL HISTORY:  Asthma chronic severe, followed by Dr. Elizabeth, 3-4 episodes of asthma bronchitis over the winter treated with po steroids, denies any exacerbation since 3/2019 or intubation hx    H/O eosinophilia     H/O gastroesophageal reflux (GERD)     Hypothyroidism resolved as per pt    Menorrhagia     Obesity     TEODORA on CPAP     Uterine fibroid

## 2021-10-23 NOTE — ED PROVIDER NOTE - PHYSICAL EXAMINATION
Vitals reviewed  Gen: tired but nontoxic appearing, persistent coughing w/ mild conversational dyspnea, no hypoxia  Skin: wwp, no rash/lesions  HEENT: ncat, eomi, mmm  CV: rrr, no audible m/r/g  Resp: symmetrical expansion, ctab, no w/r/r  Abd: nondistended, soft/nt  Ext: FROM throughout, no peripheral edema  Neuro: alert/oriented, no focal deficits, steady gait Vitals reviewed  Gen: tired but nontoxic appearing, persistent coughing w/ mild conversational dyspnea, no hypoxia  Skin: wwp, no rash/lesions  HEENT: ncat, eomi, mmm  CV: rrr, no audible m/r/g  Resp: symmetrical expansion, no retractions, diminished breath sounds throughout, exp wheeze noted on auscultation anterior chest, no rales/rhonchi  Abd: nondistended, soft, nontender, no rebound/guarding  Ext: FROM throughout, no peripheral edema/calf ttp   Neuro: alert/oriented, no focal deficits, steady gait

## 2021-10-23 NOTE — ED PROVIDER NOTE - PATIENT PORTAL LINK FT
You can access the FollowMyHealth Patient Portal offered by [facility] by registering at the following website: [email or web address]. By joining [facility]’s FollowMyHealth portal, you will also be able to view your health information using other applications (apps) compatible with our system.

## 2021-10-23 NOTE — ED ADULT NURSE NOTE - OBJECTIVE STATEMENT
Pt presents reporting 1 wk shortness of breath, dry cough, low grade fevers. Pt reports hx of asthma, on dupixent. Bilateral wheezes on arrival, worse on the right.

## 2021-10-25 ENCOUNTER — NON-APPOINTMENT (OUTPATIENT)
Age: 46
End: 2021-10-25

## 2021-10-25 DIAGNOSIS — R05.9 COUGH, UNSPECIFIED: ICD-10-CM

## 2021-10-25 LAB
RAPID RVP RESULT: DETECTED
RV+EV RNA SPEC QL NAA+PROBE: DETECTED
SARS-COV-2 RNA PNL RESP NAA+PROBE: NOT DETECTED

## 2021-10-25 RX ORDER — BENZONATATE 200 MG/1
200 CAPSULE ORAL 3 TIMES DAILY
Qty: 90 | Refills: 1 | Status: ACTIVE | COMMUNITY
Start: 2021-10-25 | End: 1900-01-01

## 2021-10-29 ENCOUNTER — APPOINTMENT (OUTPATIENT)
Dept: PULMONOLOGY | Facility: CLINIC | Age: 46
End: 2021-10-29
Payer: COMMERCIAL

## 2021-10-29 ENCOUNTER — INPATIENT (INPATIENT)
Facility: HOSPITAL | Age: 46
LOS: 1 days | Discharge: HOME | DRG: 202 | End: 2021-10-31
Attending: INTERNAL MEDICINE | Admitting: INTERNAL MEDICINE
Payer: COMMERCIAL

## 2021-10-29 VITALS — BODY MASS INDEX: 45.24 KG/M2 | HEIGHT: 64 IN | WEIGHT: 265 LBS

## 2021-10-29 VITALS
DIASTOLIC BLOOD PRESSURE: 97 MMHG | TEMPERATURE: 98 F | HEIGHT: 64 IN | OXYGEN SATURATION: 96 % | SYSTOLIC BLOOD PRESSURE: 146 MMHG | WEIGHT: 265 LBS | RESPIRATION RATE: 22 BRPM | HEART RATE: 88 BPM

## 2021-10-29 DIAGNOSIS — K21.9 GASTRO-ESOPHAGEAL REFLUX DISEASE WITHOUT ESOPHAGITIS: ICD-10-CM

## 2021-10-29 DIAGNOSIS — R63.8 OTHER SYMPTOMS AND SIGNS CONCERNING FOOD AND FLUID INTAKE: ICD-10-CM

## 2021-10-29 DIAGNOSIS — Z98.890 OTHER SPECIFIED POSTPROCEDURAL STATES: Chronic | ICD-10-CM

## 2021-10-29 DIAGNOSIS — J45.901 UNSPECIFIED ASTHMA WITH (ACUTE) EXACERBATION: ICD-10-CM

## 2021-10-29 DIAGNOSIS — G47.33 OBSTRUCTIVE SLEEP APNEA (ADULT) (PEDIATRIC): ICD-10-CM

## 2021-10-29 LAB
ALBUMIN SERPL ELPH-MCNC: 4.8 G/DL — SIGNIFICANT CHANGE UP (ref 3.3–5)
ALP SERPL-CCNC: 72 U/L — SIGNIFICANT CHANGE UP (ref 40–120)
ALT FLD-CCNC: 41 U/L — SIGNIFICANT CHANGE UP (ref 10–45)
ANION GAP SERPL CALC-SCNC: 13 MMOL/L — SIGNIFICANT CHANGE UP (ref 5–17)
ANISOCYTOSIS BLD QL: SLIGHT — SIGNIFICANT CHANGE UP
APTT BLD: 27.7 SEC — SIGNIFICANT CHANGE UP (ref 27.5–35.5)
AST SERPL-CCNC: 25 U/L — SIGNIFICANT CHANGE UP (ref 10–40)
BASOPHILS # BLD AUTO: 0 K/UL — SIGNIFICANT CHANGE UP (ref 0–0.2)
BASOPHILS NFR BLD AUTO: 0 % — SIGNIFICANT CHANGE UP (ref 0–2)
BILIRUB SERPL-MCNC: 0.6 MG/DL — SIGNIFICANT CHANGE UP (ref 0.2–1.2)
BUN SERPL-MCNC: 16 MG/DL — SIGNIFICANT CHANGE UP (ref 7–23)
CALCIUM SERPL-MCNC: 9.4 MG/DL — SIGNIFICANT CHANGE UP (ref 8.4–10.5)
CHLORIDE SERPL-SCNC: 99 MMOL/L — SIGNIFICANT CHANGE UP (ref 96–108)
CO2 SERPL-SCNC: 27 MMOL/L — SIGNIFICANT CHANGE UP (ref 22–31)
CREAT SERPL-MCNC: 0.88 MG/DL — SIGNIFICANT CHANGE UP (ref 0.5–1.3)
DACRYOCYTES BLD QL SMEAR: SLIGHT — SIGNIFICANT CHANGE UP
EOSINOPHIL # BLD AUTO: 0.15 K/UL — SIGNIFICANT CHANGE UP (ref 0–0.5)
EOSINOPHIL NFR BLD AUTO: 0.9 % — SIGNIFICANT CHANGE UP (ref 0–6)
GIANT PLATELETS BLD QL SMEAR: PRESENT — SIGNIFICANT CHANGE UP
GLUCOSE SERPL-MCNC: 104 MG/DL — HIGH (ref 70–99)
HCT VFR BLD CALC: 40.7 % — SIGNIFICANT CHANGE UP (ref 34.5–45)
HGB BLD-MCNC: 13.3 G/DL — SIGNIFICANT CHANGE UP (ref 11.5–15.5)
INR BLD: 1.07 — SIGNIFICANT CHANGE UP (ref 0.88–1.16)
LYMPHOCYTES # BLD AUTO: 31.6 % — SIGNIFICANT CHANGE UP (ref 13–44)
LYMPHOCYTES # BLD AUTO: 5.2 K/UL — HIGH (ref 1–3.3)
MACROCYTES BLD QL: SLIGHT — SIGNIFICANT CHANGE UP
MCHC RBC-ENTMCNC: 29 PG — SIGNIFICANT CHANGE UP (ref 27–34)
MCHC RBC-ENTMCNC: 32.7 GM/DL — SIGNIFICANT CHANGE UP (ref 32–36)
MCV RBC AUTO: 88.9 FL — SIGNIFICANT CHANGE UP (ref 80–100)
MICROCYTES BLD QL: SLIGHT — SIGNIFICANT CHANGE UP
MONOCYTES # BLD AUTO: 0.43 K/UL — SIGNIFICANT CHANGE UP (ref 0–0.9)
MONOCYTES NFR BLD AUTO: 2.6 % — SIGNIFICANT CHANGE UP (ref 2–14)
NEUTROPHILS # BLD AUTO: 10.11 K/UL — HIGH (ref 1.8–7.4)
NEUTROPHILS NFR BLD AUTO: 61.4 % — SIGNIFICANT CHANGE UP (ref 43–77)
OVALOCYTES BLD QL SMEAR: SLIGHT — SIGNIFICANT CHANGE UP
PLAT MORPH BLD: ABNORMAL
PLATELET # BLD AUTO: 476 K/UL — HIGH (ref 150–400)
POLYCHROMASIA BLD QL SMEAR: SLIGHT — SIGNIFICANT CHANGE UP
POTASSIUM SERPL-MCNC: 3.2 MMOL/L — LOW (ref 3.5–5.3)
POTASSIUM SERPL-SCNC: 3.2 MMOL/L — LOW (ref 3.5–5.3)
PROT SERPL-MCNC: 8.1 G/DL — SIGNIFICANT CHANGE UP (ref 6–8.3)
PROTHROM AB SERPL-ACNC: 12.8 SEC — SIGNIFICANT CHANGE UP (ref 10.6–13.6)
RBC # BLD: 4.58 M/UL — SIGNIFICANT CHANGE UP (ref 3.8–5.2)
RBC # FLD: 13.2 % — SIGNIFICANT CHANGE UP (ref 10.3–14.5)
RBC BLD AUTO: ABNORMAL
SARS-COV-2 RNA SPEC QL NAA+PROBE: SIGNIFICANT CHANGE UP
SODIUM SERPL-SCNC: 139 MMOL/L — SIGNIFICANT CHANGE UP (ref 135–145)
SPHEROCYTES BLD QL SMEAR: SLIGHT — SIGNIFICANT CHANGE UP
TROPONIN T SERPL-MCNC: 0.01 NG/ML — SIGNIFICANT CHANGE UP (ref 0–0.01)
VARIANT LYMPHS # BLD: 3.5 % — SIGNIFICANT CHANGE UP (ref 0–6)
WBC # BLD: 16.46 K/UL — HIGH (ref 3.8–10.5)
WBC # FLD AUTO: 16.46 K/UL — HIGH (ref 3.8–10.5)

## 2021-10-29 PROCEDURE — 99285 EMERGENCY DEPT VISIT HI MDM: CPT | Mod: 25

## 2021-10-29 PROCEDURE — 99223 1ST HOSP IP/OBS HIGH 75: CPT | Mod: GC

## 2021-10-29 PROCEDURE — 99212 OFFICE O/P EST SF 10 MIN: CPT | Mod: 95

## 2021-10-29 PROCEDURE — 93010 ELECTROCARDIOGRAM REPORT: CPT

## 2021-10-29 PROCEDURE — 71045 X-RAY EXAM CHEST 1 VIEW: CPT | Mod: 26

## 2021-10-29 RX ORDER — POTASSIUM CHLORIDE 20 MEQ
40 PACKET (EA) ORAL ONCE
Refills: 0 | Status: COMPLETED | OUTPATIENT
Start: 2021-10-29 | End: 2021-10-29

## 2021-10-29 RX ORDER — ACETYLCYSTEINE 200 MG/ML
4 VIAL (ML) MISCELLANEOUS THREE TIMES A DAY
Refills: 0 | Status: COMPLETED | OUTPATIENT
Start: 2021-10-29 | End: 2021-10-30

## 2021-10-29 RX ORDER — MAGNESIUM SULFATE 500 MG/ML
2 VIAL (ML) INJECTION ONCE
Refills: 0 | Status: COMPLETED | OUTPATIENT
Start: 2021-10-29 | End: 2021-10-29

## 2021-10-29 RX ORDER — AZELASTINE 137 UG/1
2 SPRAY, METERED NASAL
Qty: 0 | Refills: 0 | DISCHARGE

## 2021-10-29 RX ORDER — ALBUTEROL 90 UG/1
2.5 AEROSOL, METERED ORAL ONCE
Refills: 0 | Status: COMPLETED | OUTPATIENT
Start: 2021-10-29 | End: 2021-10-29

## 2021-10-29 RX ORDER — BUDESONIDE, MICRONIZED 100 %
2 POWDER (GRAM) MISCELLANEOUS
Qty: 0 | Refills: 0 | DISCHARGE

## 2021-10-29 RX ORDER — ACETAMINOPHEN 500 MG
650 TABLET ORAL EVERY 6 HOURS
Refills: 0 | Status: DISCONTINUED | OUTPATIENT
Start: 2021-10-29 | End: 2021-10-31

## 2021-10-29 RX ORDER — BUDESONIDE AND FORMOTEROL FUMARATE DIHYDRATE 160; 4.5 UG/1; UG/1
2 AEROSOL RESPIRATORY (INHALATION)
Refills: 0 | Status: DISCONTINUED | OUTPATIENT
Start: 2021-10-29 | End: 2021-10-31

## 2021-10-29 RX ORDER — IPRATROPIUM/ALBUTEROL SULFATE 18-103MCG
3 AEROSOL WITH ADAPTER (GRAM) INHALATION
Refills: 0 | Status: COMPLETED | OUTPATIENT
Start: 2021-10-29 | End: 2021-10-29

## 2021-10-29 RX ORDER — PSEUDOEPHEDRINE HCL 120 MG
1 TABLET, EXTENDED RELEASE ORAL
Qty: 0 | Refills: 0 | DISCHARGE

## 2021-10-29 RX ORDER — PANTOPRAZOLE SODIUM 20 MG/1
40 TABLET, DELAYED RELEASE ORAL
Refills: 0 | Status: DISCONTINUED | OUTPATIENT
Start: 2021-10-29 | End: 2021-10-31

## 2021-10-29 RX ORDER — IPRATROPIUM/ALBUTEROL SULFATE 18-103MCG
3 AEROSOL WITH ADAPTER (GRAM) INHALATION EVERY 4 HOURS
Refills: 0 | Status: DISCONTINUED | OUTPATIENT
Start: 2021-10-29 | End: 2021-10-31

## 2021-10-29 RX ORDER — POTASSIUM CHLORIDE 20 MEQ
40 PACKET (EA) ORAL EVERY 4 HOURS
Refills: 0 | Status: COMPLETED | OUTPATIENT
Start: 2021-10-29 | End: 2021-10-29

## 2021-10-29 RX ORDER — FAMOTIDINE 10 MG/ML
40 INJECTION INTRAVENOUS AT BEDTIME
Refills: 0 | Status: DISCONTINUED | OUTPATIENT
Start: 2021-10-29 | End: 2021-10-29

## 2021-10-29 RX ORDER — MONTELUKAST 4 MG/1
10 TABLET, CHEWABLE ORAL DAILY
Refills: 0 | Status: DISCONTINUED | OUTPATIENT
Start: 2021-10-29 | End: 2021-10-31

## 2021-10-29 RX ORDER — TIOTROPIUM BROMIDE 18 UG/1
2 CAPSULE ORAL; RESPIRATORY (INHALATION)
Qty: 0 | Refills: 0 | DISCHARGE

## 2021-10-29 RX ORDER — BECLOMETHASONE DIPROPIONATE 40 UG/1
2 AEROSOL, METERED RESPIRATORY (INHALATION)
Qty: 0 | Refills: 0 | DISCHARGE

## 2021-10-29 RX ADMIN — Medication 40 MILLIEQUIVALENT(S): at 15:52

## 2021-10-29 RX ADMIN — Medication 40 MILLIEQUIVALENT(S): at 18:19

## 2021-10-29 RX ADMIN — Medication 100 MILLIGRAM(S): at 20:54

## 2021-10-29 RX ADMIN — Medication 125 MILLIGRAM(S): at 12:38

## 2021-10-29 RX ADMIN — Medication 3 MILLILITER(S): at 20:53

## 2021-10-29 RX ADMIN — Medication 3 MILLILITER(S): at 12:32

## 2021-10-29 RX ADMIN — Medication 40 MILLIGRAM(S): at 23:08

## 2021-10-29 RX ADMIN — ALBUTEROL 2.5 MILLIGRAM(S): 90 AEROSOL, METERED ORAL at 15:07

## 2021-10-29 RX ADMIN — Medication 3 MILLILITER(S): at 17:03

## 2021-10-29 RX ADMIN — Medication 150 GRAM(S): at 12:38

## 2021-10-29 RX ADMIN — Medication 3 MILLILITER(S): at 12:16

## 2021-10-29 RX ADMIN — Medication 3 MILLILITER(S): at 12:39

## 2021-10-29 RX ADMIN — Medication 4 MILLILITER(S): at 21:10

## 2021-10-29 RX ADMIN — BUDESONIDE AND FORMOTEROL FUMARATE DIHYDRATE 2 PUFF(S): 160; 4.5 AEROSOL RESPIRATORY (INHALATION) at 21:10

## 2021-10-29 NOTE — DISCUSSION/SUMMARY
[FreeTextEntry1] : 10/23/2021 CXR 1 View performed at [facility]:               \par INTERPRETATION:  Clinical History: Shortness of breath\par Frontal examination of the chest demonstrates the heart to be within normal limits in transverse diameter. No acute infiltrates. Visualized osseous structures are within normal limits.\par \par IMPRESSION: No acute infiltrates\par \par \par

## 2021-10-29 NOTE — H&P ADULT - NSHPPHYSICALEXAM_GEN_ALL_CORE
PHYSICAL EXAM:    Constitutional: obese, laying but in distress due to cough   HEENT: non icteric sclera, MMM  Neck: no JVD, not using accessory muscles of neck   Respiratory: b/l wheezing present   Cardiovascular: s1 s2 present, no MRR  Gastrointestinal: soft, ND/NT  Extremities: no cyanosis, no clubbing, no edema   Neurological: AAO x3, no focal neurological deficit, mentating well

## 2021-10-29 NOTE — H&P ADULT - NSHPLABSRESULTS_GEN_ALL_CORE
Patient : Fred Musa Age: 26 year old Sex: male   MRN: [id number] Encounter Date: 5/4/2021      History     Chief Complaint   Patient presents with   • Chest Pain Adult     HPI    25y/o M presenting to the ED with intermittent CP for the past 2 days. 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 Pain felt like \"a crick in my chest that I couldn't get out.\" Pain persisted until yesterday afternoon. No provoking or palliating factors. Pain was nonradiating. He had no associated symptoms. Pt states he took a benadryl around 2PM yesterday and went to sleep. He states the pain was gone when he woke up this morning. Pt reports he is currently asymptomatic. Pt states his work made him come to the ER for evaluation. Pt denies SOB, N/V, diaphoresis. Denies past cardiac/pulmonary hx. Denies tobacco, alcohol, or recreational drug use.    Allergies   Allergen Reactions   • Keflex Other (See Comments)   • Penicillins Other (See Comments)       There are no discharge medications for this patient.      Past Medical History:   Diagnosis Date   • Attention deficit disorder        No past surgical history on file.    No family history on file.    Social History     Tobacco Use   • Smoking status: Never Smoker   • Smokeless tobacco: Never Used   Substance Use Topics   • Alcohol use: No   • Drug use: Not Currently     Types: Marijuana       E-cigarette/Vaping     E-Cigarette/Vaping Substances & Devices       Review of Systems   Constitutional: Negative for chills and fever.   HENT: Negative for sore throat.    Respiratory: Negative for cough, shortness of breath, wheezing and stridor.    Cardiovascular: Negative for palpitations and leg swelling.        Positive for chest pain, now resolved.   Gastrointestinal: Negative for abdominal pain, diarrhea, nausea and vomiting.   Musculoskeletal: Negative for back pain and neck pain.   Skin: Negative for rash.       Physical Exam      ED Triage Vitals [05/04/21 0948]   ED Triage Vitals Group      Temp 97.6 °F (36.4 °C)      Heart Rate 74      Resp 14      /55      SpO2 98 %      EtCO2 mmHg       Height       Weight       Weight Scale Used       BMI (Calculated)       IBW/kg (Calculated)        Physical Exam   Constitutional: He is oriented to person, place, and time. He appears well-developed and well-nourished. No distress.   HENT:   Head: Normocephalic and atraumatic.   Eyes: Conjunctivae are normal.   Cardiovascular: Normal rate, regular rhythm, normal heart sounds and intact distal pulses. Exam reveals no gallop and no friction rub.   No murmur heard.  Pulmonary/Chest: Effort normal and breath sounds normal. No respiratory distress. He has no wheezes. He has no rales. He exhibits no tenderness.   Abdominal: Soft. Bowel sounds are normal. He exhibits no distension and no mass. There is no abdominal tenderness. There is no rebound and no guarding.   Musculoskeletal:         General: No tenderness, deformity or edema. Normal range of motion.      Cervical back: Normal range of motion and neck supple.   Neurological: He is alert and oriented to person, place, and time.   Skin: Skin is warm and dry. No rash noted. He is not diaphoretic. No erythema. No pallor.       ED Course     Procedures    Lab Results     No results found for this visit on 05/04/21.    Radiology Results     Imaging Results    None         ED Medication Orders (From admission, onward)    None               MDM    25y/o M presenting to the ED with intermittent CP for the past 2 days. Pt states he did not want to come to the ER but his work made him come since he was having symptoms while at work. Pt states he is ok with getting a physical exam but does not want any other work-up in the ER as he does not want a high ER bill. I discussed with the patient that I would recommend obtaining a CXR and ECG to rule out the most likely emergent conditions given that he is here  for chest pain. The patient became agitated stating \"you listened to my lungs and should be able to tell that I don't have an emergency.\" Pt expressed frustration that the employees up front allowed him to be seen in the ER when he does not have an emergency and just needs a work note and does not want to be put through testing which he feels is unnecessary. I explained the purpose of the tests I was recommending and that life-threatening conditions cannot be ruled out without obtaining these tests. Pt stated \"just give me the form to sign\". Upon returning to the room, the patient had already left. Pt left prior to receiving discharge paperwork and did not sign the AMA form. Form signed by myself and TOM Mehta.    Clinical Impression     ED Diagnosis   1. Chest pain, unspecified type         Disposition        AMA 5/4/2021 11:38 AM  There is no comment                     Anisha Downey PA-C  05/04/21 3289     .  LABS:                         13.3   16.46 )-----------( 476      ( 29 Oct 2021 12:38 )             40.7     10-29    139  |  99  |  16  ----------------------------<  104<H>  3.2<L>   |  27  |  0.88    Ca    9.4      29 Oct 2021 12:38    TPro  8.1  /  Alb  4.8  /  TBili  0.6  /  DBili  x   /  AST  25  /  ALT  41  /  AlkPhos  72  10-29    PT/INR - ( 29 Oct 2021 12:38 )   PT: 12.8 sec;   INR: 1.07          PTT - ( 29 Oct 2021 12:38 )  PTT:27.7 sec    CARDIAC MARKERS ( 29 Oct 2021 12:38 )  x     / 0.01 ng/mL / x     / x     / x                RADIOLOGY, EKG & ADDITIONAL TESTS: Reviewed.

## 2021-10-29 NOTE — H&P ADULT - PROBLEM SELECTOR PLAN 3
Pt has TEODORA and takes CPAP at night Pt has TEODORA and takes CPAP at night    To do:  -resume after asthma exacerbation resolves Pt has TEODORA and takes CPAP at night  - there could be a component of obesity hypoventilation syndrome     To do:  -resume after asthma exacerbation resolves

## 2021-10-29 NOTE — ED PROVIDER NOTE - NSICDXPASTMEDICALHX_GEN_ALL_CORE_FT
PAST MEDICAL HISTORY:  Asthma chronic severe, followed by Dr. Elizabeth, 3-4 episodes of asthma bronchitis over the winter treated with po steroids, denies any exacerbation since 3/2019 or intubation hx    H/O eosinophilia     H/O gastroesophageal reflux (GERD)     Hypothyroidism resolved as per pt    Menorrhagia     Obesity     TEODORA on CPAP     Uterine fibroid      PAST MEDICAL HISTORY:  Asthma chronic severe, followed by Dr. Elizabeth, 3-4 episodes of asthma bronchitis over the winter treated with po steroids, denies any exacerbation since 3/2019 or intubation hx    H/O eosinophilia     H/O gastroesophageal reflux (GERD)     Hypothyroidism resolved as per pt    Menorrhagia     Obesity     TEODORA on CPAP     Uterine fibroid

## 2021-10-29 NOTE — H&P ADULT - HISTORY OF PRESENT ILLNESS
Pt is a 45 y/o female with PMH chronic asthma (several exacerbation in past, no h/o intubation/ ICU admission), TEODORA on CPAP, iron deficiency anemia, GERD, hiatal hernia presented with worsening shortness of breath and cough since morning. She was recently admitted on 10/23 with the same exacerbation and was treated with nebulizer in the ED. Today, she states that her symptoms are similar to her previous episodes. She sees outpatient pulmonologist Dr. Elizabeth and has all her medications. She took Duoneb 3 times before coming to the ED.  On being further interviewed, she gets shot of breath while taking and starts coughing. She denies any fever, chills, chest pain, nausea, vomiting, abdominal pain, diarrhea, dysuria, numbness or weakness.    Home meds: Prednisone 40mg, Benzonatate 200mg, Promethazine-Codeine syrup, Dupixent 300 twice a month, spiriva 2.5mg OD, Duonebs prn, nexium 40 mg, zyrtec, iron tabs    In the ED  Vitals: T 98.2, HR 88, /97, RR 22, SpO2 98%  Labs: Hb 13.3, WBC 16.46, Plt 476, Na 139, K 3.2, BUN 16, Cr 0.88, Glucose 104, RVP positive for Rhinovirus   Labs: CXR: Heart, lungs, mediastinum and thorax are unremarkable.  Treatment: solumedrol x125mg, Magnesium IV 2mg, potassium tab x 2, albuterol nebulizer    Patient is admitted for the management of acute asthma exacerbation

## 2021-10-29 NOTE — ASSESSMENT
[FreeTextEntry1] : Ms. Edwards is a 46 year old female with a history of allergic rhinitis, severe persistent/steroid dependent asthma, asthmatic bronchitis, GERD, eosinophilia, nocturia, sleep apnea, SOB and vitamin D deficiency presenting to the office via video. Her chief concern is wheezing, persistent SOB, & chest tightness x 1 week. \par \par 1. Asthma, severe persistent exacerbated state d/t recent diagnosis of Rhino/Entero virus:\par - Despite use of Duoneb Q4H, QVar, Spiriva, Benzonatate TID, & Promethazine w/ no relief of wheezing, persistent SOB, & chest tightness, patient advised to seek treatment at Urgent Care or ED for physical evaluation and possible IM/IV Steroids.  \par \par Patient to follow up for in person visit in 1-2 weeks. \par Patient to call with further questions and concerns.\par Patient verbalizes understanding of care plan and is agreeable.\par

## 2021-10-29 NOTE — ED PROVIDER NOTE - OBJECTIVE STATEMENT
47 y/o F pt w/ PMHx of asthma, sleep apnea, presents to the ED c/o worsening asthma exacerbation for the past week. Patient states that she was seen one week ago for her asthma and was sent home with prednisone with no improvement. Reports that she uses her nebulizer 5 times a day and her rescue inhaler multiple times. Notes wheezing, but no productive cough. Denies CP, SOB, nausea, vomiting, diarrhea, abdominal pain, fevers, chills, or any other complaints.

## 2021-10-29 NOTE — PHYSICAL EXAM
[No Acute Distress] : no acute distress [No Deformities] : no deformities [Normal Color/ Pigmentation] : normal color/ pigmentation [Oriented x3] : oriented x3 [Normal Mood] : normal mood [Normal Affect] : normal affect [No Resp Distress] : no resp distress [No Acc Muscle Use] : no acc muscle use [TextBox_2] : Visibly upset; crying on video.  [TextBox_68] : Patient coughing while talking.

## 2021-10-29 NOTE — ED ADULT NURSE NOTE - OBJECTIVE STATEMENT
Pt presents co asthma exacerbation x7 days. Pt on PO steroids x7 days without relief, and using nebulizer tx 5x per day without relief. pt presents with increased RR and erythema to cheeks and forehead. Pt able to speak in full sentences, however with cough spells. Pt also complains of urinary incontinence with urination. Pt never been intubated for asthma before.

## 2021-10-29 NOTE — H&P ADULT - PROBLEM SELECTOR PLAN 4
F: none  E: replete prn  N: regular diet   A: none IMPROVE: 0  GI: esomeprazole 40 mg   D: RMF  FULL CODE

## 2021-10-29 NOTE — H&P ADULT - PROBLEM SELECTOR PLAN 1
Pt presented with worsening wheezing and cough. RVP positive for Rhinovirus. Recent admission 10/23, on admission RR 22, SpO2 98  - pt follows Dr. Elizabeth outpatient and is on prednisone 40 mg,  Benzonatate 200mg, Promethazine-Codeine syrup and zyrtec, Dupixent 300 twice a month, spiriva 2.5mg OD, Duonebs prn  - s/p solumedrol x125mg, Magnesium IV 2mg, potassium tab x 2, albuterol nebulizer    To do:  - cw duonebs ATC q4hrly  - received solumedrol 125mg x1 in the ED and cw prednisone 40 mg   - cw home Benzonatate 200mg, Promethazine-Codeine syrup and zyrtec  - monitor SpO2 and vitals Pt presented with worsening wheezing and cough. RVP positive for Rhinovirus. Recent admission 10/23, on admission RR 22, SpO2 98  - pt follows Dr. Elizabeth outpatient and is on prednisone 40 mg,  Benzonatate 200mg, Promethazine-Codeine syrup and zyrtec, Dupixent 300 twice a month, spiriva 2.5mg OD, Duonebs prn  - s/p solumedrol x125mg, Magnesium IV 2mg, potassium tab x 2, albuterol nebulizer    To do:  - cw duonebs ATC q4hrly and symbicort inhaler 2 times a day   - received solumedrol 125mg x1 in the ED and cw prednisone 40 mg for a total of 5 days   - cw home Benzonatate 200mg, Promethazine-Codeine syrup, zyrtec and mucomyst (3 doses)   - monitor SpO2 and vitals Pt presented with worsening wheezing and cough. RVP positive for Rhinovirus. Recent admission 10/23, on admission RR 22, SpO2 98  - pt follows Dr. Elizabeth outpatient and is on prednisone 40 mg,  Benzonatate 200mg, Promethazine-Codeine syrup and zyrtec, Dupixent 300 twice a month, spiriva 2.5mg OD, Duonebs prn  - s/p solumedrol x125mg, Magnesium IV 2mg, potassium tab x 2, albuterol nebulizer    To do:  - cw duonebs ATC q4hrly and symbicort inhaler 2 times a day   - received solumedrol 125mg x1 in the ED and cw solumedrol 40 mg IV bid, switch to PO as symptoms improves    - cw home Benzonatate 200mg, Promethazine-Codeine syrup, zyrtec and mucomyst (3 doses)   - monitor SpO2 and vitals

## 2021-10-29 NOTE — ED PROVIDER NOTE - ATTENDING CONTRIBUTION TO CARE
47 y/o F pt w/ PMHx of asthma, sleep apnea, presents to the ED c/o worsening asthma exacerbation for the past week. Patient states that she was seen one week ago for her asthma and was sent home with prednisone with no improvement. Reports that she uses her nebulizer 5 times a day and her rescue inhaler multiple times. Notes wheezing, but no productive cough. Denies CP, SOB, nausea, vomiting, diarrhea, abdominal pain, fevers, chills, or any other complaints.    Agree with above note as documented by PA.  I was available as the supervising attending during patient's ER evaluation.    Pt feeling moderately better after treatment in ED but still with wheeze and seen in ED one week prior and therefore will admit for further management steroids, nebs.

## 2021-10-29 NOTE — HISTORY OF PRESENT ILLNESS
[TextBox_4] : Ms. Edwards is a 46 year old female with a history of allergic rhinitis, severe persistent/steroid dependent asthma, asthmatic bronchitis, GERD, eosinophilia, nocturia, sleep apnea, SOB and vitamin D deficiency presenting to the office via video \par \par Her chief concern is wheezing, persistent SOB, & chest tightness x 1 week. \par \par Per noted obtained from [facility]IE: "HPI Objective Statement: 46 F pmh asthma (last hospitalization 2020- no intubations/ICU admissions), TEODORA on CPAP at night p/w cough and sob x 3 days. She reports dry cough progressively worsening w/ chest tightness and sob. She has been taking duoneb and symbicort with mild relief. SOB worse this morning. Pulmonologist (Dr. Elizabeth) told her to come to ED this morning, had covid test at outpt lab but did not yet get result. Also w/ low grade temp Thursday night, around 100 Denies headache, dizziness, fainting, chest pain, palpitations, abd pain, nvd, urinary sxs, leg pain/swelling, recent travel/immobilization, h/o dvt/pe, use of hormones, hemoptysis, trauma."\par \par Patient tested (+) for entero/rhino virus via RVP on 10/23/21.  She states she was on Prednisone 40 mg and then tapered to 30 mg PO.  She states she is on duoneb Q4H, QVar, Spiriva, Benzonatate TID, & Promethazine. She states nothing has relieved her symptoms of wheezing, persistent SOB, & chest tightness. She notes her normal peak flow is 500 and she has been 250 all week, without any improvement.  \par \par Patient notes she does not have pulse oximeter and is unsure of her SPO2.

## 2021-10-29 NOTE — ED PROVIDER NOTE - CLINICAL SUMMARY MEDICAL DECISION MAKING FREE TEXT BOX
asthma with no improvement on prednisone. pt well appearing. vss. wheezing on exam. solu medrol. magnesium, nebs given. minimal improvement . no acute pathology on cxr. will plan for admission.

## 2021-10-29 NOTE — H&P ADULT - ASSESSMENT
Pt is a 45 y/o female with PMH chronic asthma (several exacerbation in past, no h/o intubation/ ICU admission), TEODORA on CPAP, iron deficiency anemia, GERD, hiatal hernia presented with worsening shortness of breath and cough since morning, is admitted for the management of acute asthma exacerbation.

## 2021-10-29 NOTE — REVIEW OF SYSTEMS
[Cough] : cough [Chest Tightness] : chest tightness [Dyspnea] : dyspnea [Wheezing] : wheezing [SOB on Exertion] : sob on exertion [Obesity] : obesity [Negative] : Psychiatric [Fatigue] : fatigue [Fever] : no fever [Chills] : no chills [Poor Appetite] : no poor appetite

## 2021-10-29 NOTE — REASON FOR VISIT
[Medical Office: ([facility])___] : at the medical office located in  [Verbal consent obtained from patient] : the patient, [unfilled] [Acute] : an acute visit [Other Location: e.g. School (Enter Location, City,State)___] : at [unfilled], at the time of the visit. [Asthma] : asthma [Cough] : cough [Shortness of Breath] : shortness of breath [Wheezing] : wheezing [Obesity] : obesity

## 2021-10-29 NOTE — ED PROVIDER NOTE - NSICDXPASTSURGICALHX_GEN_ALL_CORE_FT
PAST SURGICAL HISTORY:  S/P LASIK surgery     S/P ovarian cystectomy left 2011    Status post D&C hysteroscopy     PAST SURGICAL HISTORY:  S/P LASIK surgery     S/P ovarian cystectomy left 2011    Status post D&C hysteroscopy

## 2021-10-29 NOTE — ED ADULT TRIAGE NOTE - CHIEF COMPLAINT QUOTE
Pt presents co asthma exacerbation x4 days. Pt on PO steroids x3 days without relief, and using nebulizer tx 5x per day without relief. Pt never been intubated for asthma before. Pt speaking in full sentences, chest rise equal and symmetrical.

## 2021-10-29 NOTE — H&P ADULT - ATTENDING COMMENTS
Patient seen and examined at bedside. Agree with exam, a/p as above with the following addendum/edits:     46 year old F w/ severe persistent asthma, followed closely by pulm outpatient, morbid obesity, TEODORA, presenting acute asthma exacerbation 2/2 rhinovirus. She was seen in the ED a few days ago but did not improve. She received solumedrol, mag, and duonebs in the ED. At bedside she had just returned from bathroom but RR improved during interview. The coughing fits are triggering episodes of SOB and still quite wheezy on exam but moving air. Will c/w solumedrol 40 mg q12h and re-assess in the AM, c/w mucomyst and hycodan for cough, duonebs q4h. Peak flow reported to be 250 and baseline is 500. Will have night team monitor patient overnight.

## 2021-10-30 ENCOUNTER — TRANSCRIPTION ENCOUNTER (OUTPATIENT)
Age: 46
End: 2021-10-30

## 2021-10-30 LAB
ANION GAP SERPL CALC-SCNC: 13 MMOL/L — SIGNIFICANT CHANGE UP (ref 5–17)
BUN SERPL-MCNC: 14 MG/DL — SIGNIFICANT CHANGE UP (ref 7–23)
CALCIUM SERPL-MCNC: 9.4 MG/DL — SIGNIFICANT CHANGE UP (ref 8.4–10.5)
CHLORIDE SERPL-SCNC: 101 MMOL/L — SIGNIFICANT CHANGE UP (ref 96–108)
CO2 SERPL-SCNC: 23 MMOL/L — SIGNIFICANT CHANGE UP (ref 22–31)
COVID-19 SPIKE DOMAIN AB INTERP: POSITIVE
COVID-19 SPIKE DOMAIN ANTIBODY RESULT: >250 U/ML — HIGH
CREAT SERPL-MCNC: 0.57 MG/DL — SIGNIFICANT CHANGE UP (ref 0.5–1.3)
GLUCOSE SERPL-MCNC: 158 MG/DL — HIGH (ref 70–99)
HCT VFR BLD CALC: 39.1 % — SIGNIFICANT CHANGE UP (ref 34.5–45)
HGB BLD-MCNC: 12.9 G/DL — SIGNIFICANT CHANGE UP (ref 11.5–15.5)
MAGNESIUM SERPL-MCNC: 2.4 MG/DL — SIGNIFICANT CHANGE UP (ref 1.6–2.6)
MCHC RBC-ENTMCNC: 29.8 PG — SIGNIFICANT CHANGE UP (ref 27–34)
MCHC RBC-ENTMCNC: 33 GM/DL — SIGNIFICANT CHANGE UP (ref 32–36)
MCV RBC AUTO: 90.3 FL — SIGNIFICANT CHANGE UP (ref 80–100)
NRBC # BLD: 0 /100 WBCS — SIGNIFICANT CHANGE UP (ref 0–0)
PHOSPHATE SERPL-MCNC: 2.9 MG/DL — SIGNIFICANT CHANGE UP (ref 2.5–4.5)
PLATELET # BLD AUTO: 484 K/UL — HIGH (ref 150–400)
POTASSIUM SERPL-MCNC: 4.3 MMOL/L — SIGNIFICANT CHANGE UP (ref 3.5–5.3)
POTASSIUM SERPL-SCNC: 4.3 MMOL/L — SIGNIFICANT CHANGE UP (ref 3.5–5.3)
RBC # BLD: 4.33 M/UL — SIGNIFICANT CHANGE UP (ref 3.8–5.2)
RBC # FLD: 13.2 % — SIGNIFICANT CHANGE UP (ref 10.3–14.5)
SARS-COV-2 IGG+IGM SERPL QL IA: >250 U/ML — HIGH
SARS-COV-2 IGG+IGM SERPL QL IA: POSITIVE
SODIUM SERPL-SCNC: 137 MMOL/L — SIGNIFICANT CHANGE UP (ref 135–145)
WBC # BLD: 18.46 K/UL — HIGH (ref 3.8–10.5)
WBC # FLD AUTO: 18.46 K/UL — HIGH (ref 3.8–10.5)

## 2021-10-30 PROCEDURE — 99233 SBSQ HOSP IP/OBS HIGH 50: CPT | Mod: GC

## 2021-10-30 RX ORDER — INFLUENZA VIRUS VACCINE 15; 15; 15; 15 UG/.5ML; UG/.5ML; UG/.5ML; UG/.5ML
0.5 SUSPENSION INTRAMUSCULAR ONCE
Refills: 0 | Status: DISCONTINUED | OUTPATIENT
Start: 2021-10-30 | End: 2021-10-31

## 2021-10-30 RX ADMIN — BUDESONIDE AND FORMOTEROL FUMARATE DIHYDRATE 2 PUFF(S): 160; 4.5 AEROSOL RESPIRATORY (INHALATION) at 09:12

## 2021-10-30 RX ADMIN — MONTELUKAST 10 MILLIGRAM(S): 4 TABLET, CHEWABLE ORAL at 13:15

## 2021-10-30 RX ADMIN — BUDESONIDE AND FORMOTEROL FUMARATE DIHYDRATE 2 PUFF(S): 160; 4.5 AEROSOL RESPIRATORY (INHALATION) at 23:29

## 2021-10-30 RX ADMIN — Medication 100 MILLIGRAM(S): at 13:16

## 2021-10-30 RX ADMIN — Medication 100 MILLIGRAM(S): at 22:23

## 2021-10-30 RX ADMIN — Medication 3 MILLILITER(S): at 09:13

## 2021-10-30 RX ADMIN — Medication 3 MILLILITER(S): at 05:06

## 2021-10-30 RX ADMIN — Medication 3 MILLILITER(S): at 22:23

## 2021-10-30 RX ADMIN — PANTOPRAZOLE SODIUM 40 MILLIGRAM(S): 20 TABLET, DELAYED RELEASE ORAL at 06:00

## 2021-10-30 RX ADMIN — Medication 3 MILLILITER(S): at 18:33

## 2021-10-30 RX ADMIN — Medication 3 MILLILITER(S): at 13:17

## 2021-10-30 RX ADMIN — Medication 100 MILLIGRAM(S): at 05:59

## 2021-10-30 RX ADMIN — Medication 50 MILLIGRAM(S): at 13:16

## 2021-10-30 RX ADMIN — Medication 3 MILLILITER(S): at 01:32

## 2021-10-30 NOTE — PROGRESS NOTE ADULT - ASSESSMENT
Pt is a 47 y/o female with PMH chronic asthma (several exacerbation in past, no h/o intubation/ ICU admission), TEODORA on CPAP, iron deficiency anemia, GERD, hiatal hernia presented with worsening shortness of breath and cough since morning, is admitted for the management of acute asthma exacerbation.

## 2021-10-30 NOTE — DISCHARGE NOTE PROVIDER - NSDCMRMEDTOKEN_GEN_ALL_CORE_FT
benzonatate 200 mg oral capsule: 1 cap(s) orally 3 times a day  DuoNeb 0.5 mg-2.5 mg/3 mL inhalation solution: 3 milliliter(s) inhaled 4 times a day, As Needed  NexIUM 40 mg oral delayed release capsule: 1 cap(s) orally once a day  Pepcid 40 mg oral tablet: 1 tab(s) orally once a day (at bedtime)  Perforomist 20 mcg/2 mL inhalation solution: 2 milliliter(s) inhaled 2 times a day  predniSONE 20 mg oral tablet: 2 tab(s) orally once a day   Promethazine HCl and Codeine Phosphate 6.25 mg-10 mg/5 mL oral syrup: 5 milliliter(s) orally every 8 hours, As Needed -for cough MDD:15mL  Singulair 10 mg oral tablet: 1 tab(s) orally once a day  tiotropium 2.5 mcg/inh inhalation aerosol: 2 puff(s) inhaled once a day   benzonatate 200 mg oral capsule: 1 cap(s) orally 3 times a day  DuoNeb 0.5 mg-2.5 mg/3 mL inhalation solution: 3 milliliter(s) inhaled 4 times a day, As Needed  Dupixent 300 mg/2 mL subcutaneous solution: subcutaneous 2 times a month  NexIUM 40 mg oral delayed release capsule: 1 cap(s) orally once a day  predniSONE 50 mg oral tablet: 1 tab(s) orally once a day   Promethazine HCl and Codeine Phosphate 6.25 mg-10 mg/5 mL oral syrup: 5 milliliter(s) orally every 8 hours, As Needed -for cough MDD:15mL  tiotropium 2.5 mcg/inh inhalation aerosol: 2 puff(s) inhaled once a day  ZyrTEC 10 mg oral tablet: 1 tab(s) orally once a day

## 2021-10-30 NOTE — PROGRESS NOTE ADULT - PROBLEM SELECTOR PLAN 3
Pt has TEODORA and takes CPAP at night. There could be a component of obesity hypoventilation syndrome   -resume after asthma exacerbation resolves

## 2021-10-30 NOTE — PROGRESS NOTE ADULT - SUBJECTIVE AND OBJECTIVE BOX
Incomplete SUBJECTIVE/OVERNIGHT EVENTS: No acute overnight events. Pt seen in AM at bedside, resting comfortably in bed, and does not appear to be in any acute distress. When asked, pt denies any recent or active fever, chills, nausea, vomiting, headache, acute sob, chest pain, abdominal pain, genitourinary sx, extremity pain or swelling.    VITAL SIGNS:  Vital Signs Last 24 Hrs  T(C): 36.7 (30 Oct 2021 21:04), Max: 37.2 (30 Oct 2021 14:48)  T(F): 98 (30 Oct 2021 21:04), Max: 99 (30 Oct 2021 14:48)  HR: 96 (30 Oct 2021 22:13) (81 - 96)  BP: 114/70 (30 Oct 2021 21:04) (114/70 - 144/88)  BP(mean): --  RR: 18 (30 Oct 2021 22:13) (17 - 20)  SpO2: 96% (30 Oct 2021 22:13) (95% - 99%)    PHYSICAL EXAM:  General: NAD; speaking in full sentences  HEENT: NC/AT; PERRL; EOMI; MMM  Neck: supple; no JVD  Cardiac: RRR; +S1/S2  Pulm: mild R sided wheezing  GI: soft, NT/ND, +BS  Extremities: WWP; no edema, clubbing or cyanosis  Vasc: 2+ radial, DP pulses B/L  Neuro: AAOx3; no focal deficits    MEDICATIONS:  MEDICATIONS  (STANDING):  albuterol/ipratropium for Nebulization 3 milliLiter(s) Nebulizer every 4 hours  benzonatate 100 milliGRAM(s) Oral three times a day  budesonide  80 MICROgram(s)/formoterol 4.5 MICROgram(s) Inhaler 2 Puff(s) Inhalation two times a day  hydrocodone/homatropine Syrup 5 milliLiter(s) Oral every 6 hours  influenza   Vaccine 0.5 milliLiter(s) IntraMuscular once  montelukast 10 milliGRAM(s) Oral daily  pantoprazole    Tablet 40 milliGRAM(s) Oral before breakfast  predniSONE   Tablet 50 milliGRAM(s) Oral daily    MEDICATIONS  (PRN):  acetaminophen     Tablet .. 650 milliGRAM(s) Oral every 6 hours PRN Temp greater or equal to 38C (100.4F), Mild Pain (1 - 3)      ALLERGIES:  Allergies    Mobic (Rash)  shellfish (Hives)    Intolerances        LABS:                        12.9   18.46 )-----------( 484      ( 30 Oct 2021 08:32 )             39.1     10-30    137  |  101  |  14  ----------------------------<  158<H>  4.3   |  23  |  0.57    Ca    9.4      30 Oct 2021 08:32  Phos  2.9     10-30  Mg     2.4     10-30    TPro  8.1  /  Alb  4.8  /  TBili  0.6  /  DBili  x   /  AST  25  /  ALT  41  /  AlkPhos  72  10-29    PT/INR - ( 29 Oct 2021 12:38 )   PT: 12.8 sec;   INR: 1.07          PTT - ( 29 Oct 2021 12:38 )  PTT:27.7 sec    RADIOLOGY & ADDITIONAL TESTS: Reviewed.

## 2021-10-30 NOTE — DISCHARGE NOTE PROVIDER - HOSPITAL COURSE
#Discharge: do not delete    Patient is __ yo M/F with past medical history of _____, presented with _____, found to have _____    Inpatient treatment course:     Problem List/Main Diagnoses:     New medications/therapies:   New lines/hardware:  Labs to be followed outpatient:   Exam to be followed outpatient:     Discharge plan: discharge to ______    Pt is a 47 y/o female with PMH chronic asthma (several exacerbation in past, no h/o intubation/ ICU admission), TEODORA on CPAP, iron deficiency anemia, GERD, hiatal hernia presented with worsening shortness of breath and cough since morning, is admitted for the management of acute asthma exacerbation.        Problem/Plan - 1:  ·  Problem: Acute asthma exacerbation.   ·  Plan: Pt presented with worsening wheezing and cough. RVP positive for Rhinovirus. Recent admission 10/23, on admission RR 22, SpO2 98  - pt follows Dr. Elizabeth outpatient and is on prednisone 40 mg,  Benzonatate 200mg, Promethazine-Codeine syrup and zyrtec, Dupixent 300 twice a month, spiriva 2.5mg OD, Duonebs prn  - s/p solumedrol x125mg, Magnesium IV 2mg, potassium tab x 2, albuterol nebulizer    To do:  - cw duonebs ATC q4hrly and symbicort inhaler 2 times a day   - received solumedrol 125mg x1 in the ED and cw solumedrol 40 mg IV bid, switch to PO as symptoms improves    - cw home Benzonatate 200mg, Promethazine-Codeine syrup, zyrtec and mucomyst (3 doses)   - monitor SpO2 and vitals.     Problem/Plan - 2:  ·  Problem: GERD (gastroesophageal reflux disease).   ·  Plan: Pt takes nexium at home     To do:  -cw Esomeprazole 40 mg daily.     Problem/Plan - 3:  ·  Problem: TEODORA on CPAP.   ·  Plan: Pt has TEODORA and takes CPAP at night  - there could be a component of obesity hypoventilation syndrome     To do:  -resume after asthma exacerbation resolves.   Patient is a 45 y/o female with PMH chronic asthma (several exacerbation in past, no h/o intubation/ICU admission), TEODORA on CPAP, iron deficiency anemia, GERD, hiatal hernia presented with worsening shortness of breath and cough, was admitted for the management of acute asthma exacerbation.     Problem List/Main Diagnoses:     #Acute asthma exacerbation  Patient presented with worsening wheezing and cough. RVP positive for Rhinovirus. Recent admission 10/23, on admission RR 22, SpO2 98. Patient follows with pulmonologist, Dr. Elizabeth outpatient. Patient received the following medications in the ED: solumedrol x125mg, Magnesium IV 2mg, potassium tab x 2, albuterol nebulizer.  - C/W with ???  - F/U with your pulmonologist    GERD (gastroesophageal reflux disease).   - C/W home Nexium    TEODORA on CPAP  - C/W CPAP after resolution of asthma exacerbation.    New medications/therapies: ???  New lines/hardware: none  Labs to be followed outpatient: none  Exam to be followed outpatient: pulmonology     Discharge plan: discharge to home     Patient is a 47 y/o female with PMH chronic asthma (several exacerbation in past, no h/o intubation/ICU admission), TEODORA on CPAP, iron deficiency anemia, GERD, hiatal hernia presented with worsening shortness of breath and cough, was admitted for the management of acute asthma exacerbation.     Problem List/Main Diagnoses:     #Acute asthma exacerbation  Patient presented with worsening wheezing and cough. RVP positive for Rhinovirus. Recent admission 10/23, on admission RR 22, SpO2 98. Patient follows with pulmonologist, Dr. Elizabeth outpatient. Patient received the following medications in the ED: solumedrol x125mg, Magnesium IV 2mg, potassium tab x 2, albuterol nebulizer.  - C/W with ???  - F/U with your pulmonologist    GERD (gastroesophageal reflux disease)  - C/W home Nexium    TEODORA on CPAP  - C/W CPAP after resolution of asthma exacerbation    New medications/therapies: ???  New lines/hardware: none  Labs to be followed outpatient: none  Exam to be followed outpatient: pulmonology     Discharge plan: discharge to home Patient is a 45 y/o female with PMH chronic asthma (several exacerbation in past, no h/o intubation/ICU admission), TEODORA on CPAP, iron deficiency anemia, GERD, hiatal hernia presented with worsening shortness of breath and cough, was admitted for the management of acute asthma exacerbation.     Problem List/Main Diagnoses:     #Acute asthma exacerbation  Patient presented with worsening wheezing and cough. RVP positive for Rhinovirus. Recent admission 10/23, on admission RR 22, SpO2 98. Patient follows with pulmonologist, Dr. Elizabeth outpatient. Patient received the following medications in the ED: solumedrol x125mg, Magnesium IV 2mg, potassium tab x 2, albuterol nebulizer.  - C/W with prednisone 50 and will be discharged with a prednisone taper  - F/U with your pulmonologist    GERD (gastroesophageal reflux disease)  - C/W home Nexium    TEODORA on CPAP  - C/W CPAP after resolution of asthma exacerbation    New medications/therapies: prednisone 50 with taper  New lines/hardware: none  Labs to be followed outpatient: none  Exam to be followed outpatient: pulmonology     Discharge plan: discharge to home Patient is a 47 y/o female with PMH chronic asthma (several exacerbation in past, no h/o intubation/ICU admission), TEODORA on CPAP, iron deficiency anemia, GERD, hiatal hernia presented with worsening shortness of breath and cough, was admitted for the management of acute asthma exacerbation.     Problem List/Main Diagnoses:     #Acute asthma exacerbation  Patient presented with worsening wheezing and cough. RVP positive for Rhinovirus. Recent admission 10/23, on admission RR 22, SpO2 98. Patient follows with pulmonologist, Dr. Elizabeth outpatient. Patient received the following medications in the ED: solumedrol x125mg, Magnesium IV 2mg, potassium tab x 2, albuterol nebulizer.  - C/W with prednisone 50 for 5 more days  - F/U with your pulmonologist    GERD (gastroesophageal reflux disease)  - C/W home Nexium    TEODORA on CPAP  - C/W CPAP after resolution of asthma exacerbation    New medications/therapies: prednisone 50 for 5 more days  New lines/hardware: none  Labs to be followed outpatient: none  Exam to be followed outpatient: pulmonology     Discharge plan: discharge to home

## 2021-10-30 NOTE — DISCHARGE NOTE PROVIDER - NSDCCPCAREPLAN_GEN_ALL_CORE_FT
PRINCIPAL DISCHARGE DIAGNOSIS  Diagnosis: Asthma with acute exacerbation  Assessment and Plan of Treatment: You have a condition called asthma, which is a long term disease of the lungs. It causes your  airways to become inflamed and narrow. Please continue to take your medication as prescribed by your pulmonologist. Please follow up with your pulmonologist within one week of discharge from the hospital.        SECONDARY DISCHARGE DIAGNOSES  Diagnosis: TEODORA on CPAP  Assessment and Plan of Treatment: You have a condition called obstructive sleep apnea. Please follow up with your pulmonologist within one week of discharge from the hospital. Please consult with your pulmonologist before resumption of your CPAP machine.     PRINCIPAL DISCHARGE DIAGNOSIS  Diagnosis: Asthma with acute exacerbation  Assessment and Plan of Treatment: You have a condition called asthma, which is a long term disease of the lungs. It causes your  airways to become inflamed and narrow. You initially given IV steroids which improved your sypmtoms, and you were transitioned to oral steroids. We will be discharging you with oral  prednisone on a taper. Please take this medicationa s instructed and follow up with Dr. Elizabeth this week. Please continue to take your medication as prescribed by your pulmonologist.         SECONDARY DISCHARGE DIAGNOSES  Diagnosis: ETODORA on CPAP  Assessment and Plan of Treatment: You have a condition called obstructive sleep apnea. Please follow up with your pulmonologist within one week of discharge from the hospital. Please consult with your pulmonologist before resumption of your CPAP machine.     PRINCIPAL DISCHARGE DIAGNOSIS  Diagnosis: Asthma with acute exacerbation  Assessment and Plan of Treatment: You have a condition called asthma, which is a long term disease of the lungs. It causes your  airways to become inflamed and narrow. You initially given IV steroids which improved your symptoms, and you were transitioned to oral steroids. We will be discharging you with oral  prednisone 50mg daily which you should take for another 5 days. Please continue to take your medication as prescribed by your pulmonologist.         SECONDARY DISCHARGE DIAGNOSES  Diagnosis: TEODORA on CPAP  Assessment and Plan of Treatment: You have a condition called obstructive sleep apnea. Please follow up with your pulmonologist within one week of discharge from the hospital. Please consult with your pulmonologist before resumption of your CPAP machine.

## 2021-10-30 NOTE — DISCHARGE NOTE PROVIDER - CARE PROVIDER_API CALL
Rodrigue Elizabeth)  Internal Medicine; Pulmonary Disease  [address], [address]  [place] [address]  Phone: [phone]  Fax: [phone]  Established Patient  Follow Up Time:

## 2021-10-30 NOTE — DISCHARGE NOTE PROVIDER - NSDCFUADDAPPT_GEN_ALL_CORE_FT
Please follow up with your primary care provider within 1 week of discharge.   Please follow up with your pulmonologist within one week of discharge from the hospital.

## 2021-10-30 NOTE — PROGRESS NOTE ADULT - PROBLEM SELECTOR PLAN 1
Pt presented with worsening wheezing and cough. RVP positive for Rhinovirus. Recent admission 10/23, on admission RR 22, SpO2 98. pt follows Dr. Elizabeth outpatient and is on prednisone 40 mg,  Benzonatate 200mg, Promethazine-Codeine syrup and zyrtec, Dupixent 300 twice a month, spiriva 2.5mg OD, Duonebs prn. s/p solumedrol x125mg, Magnesium IV 2mg, potassium tab x 2, albuterol nebulizer in th ED.  - cw duonebs q4 and symbicort inhaler 2 times a day   - received solumedrol 125mg x1 in the ED, now on prednisone 50mg   - cw home Benzonatate 200mg, Promethazine-Codeine syrup, zyrtec and mucomyst (3 doses)

## 2021-10-31 ENCOUNTER — TRANSCRIPTION ENCOUNTER (OUTPATIENT)
Age: 46
End: 2021-10-31

## 2021-10-31 VITALS — HEART RATE: 79 BPM | RESPIRATION RATE: 18 BRPM | OXYGEN SATURATION: 98 %

## 2021-10-31 PROCEDURE — 71045 X-RAY EXAM CHEST 1 VIEW: CPT

## 2021-10-31 PROCEDURE — 85025 COMPLETE CBC W/AUTO DIFF WBC: CPT

## 2021-10-31 PROCEDURE — 99239 HOSP IP/OBS DSCHRG MGMT >30: CPT | Mod: GC

## 2021-10-31 PROCEDURE — 36415 COLL VENOUS BLD VENIPUNCTURE: CPT

## 2021-10-31 PROCEDURE — U0005: CPT

## 2021-10-31 PROCEDURE — 83735 ASSAY OF MAGNESIUM: CPT

## 2021-10-31 PROCEDURE — 96374 THER/PROPH/DIAG INJ IV PUSH: CPT

## 2021-10-31 PROCEDURE — 96375 TX/PRO/DX INJ NEW DRUG ADDON: CPT

## 2021-10-31 PROCEDURE — 94660 CPAP INITIATION&MGMT: CPT

## 2021-10-31 PROCEDURE — 85730 THROMBOPLASTIN TIME PARTIAL: CPT

## 2021-10-31 PROCEDURE — 80053 COMPREHEN METABOLIC PANEL: CPT

## 2021-10-31 PROCEDURE — U0003: CPT

## 2021-10-31 PROCEDURE — 94640 AIRWAY INHALATION TREATMENT: CPT

## 2021-10-31 PROCEDURE — 85610 PROTHROMBIN TIME: CPT

## 2021-10-31 PROCEDURE — 99285 EMERGENCY DEPT VISIT HI MDM: CPT | Mod: 25

## 2021-10-31 PROCEDURE — 80048 BASIC METABOLIC PNL TOTAL CA: CPT

## 2021-10-31 PROCEDURE — 85027 COMPLETE CBC AUTOMATED: CPT

## 2021-10-31 PROCEDURE — 86769 SARS-COV-2 COVID-19 ANTIBODY: CPT

## 2021-10-31 PROCEDURE — 84484 ASSAY OF TROPONIN QUANT: CPT

## 2021-10-31 PROCEDURE — 84100 ASSAY OF PHOSPHORUS: CPT

## 2021-10-31 RX ORDER — DUPILUMAB 300 MG/2ML
0 INJECTION, SOLUTION SUBCUTANEOUS
Qty: 0 | Refills: 0 | DISCHARGE

## 2021-10-31 RX ORDER — FORMOTEROL FUMARATE 12 MCG
2 CAPSULE, WITH INHALATION DEVICE INHALATION
Qty: 0 | Refills: 0 | DISCHARGE

## 2021-10-31 RX ORDER — FAMOTIDINE 10 MG/ML
1 INJECTION INTRAVENOUS
Qty: 0 | Refills: 0 | DISCHARGE

## 2021-10-31 RX ORDER — CETIRIZINE HYDROCHLORIDE 10 MG/1
1 TABLET ORAL
Qty: 0 | Refills: 0 | DISCHARGE

## 2021-10-31 RX ADMIN — Medication 100 MILLIGRAM(S): at 06:28

## 2021-10-31 RX ADMIN — Medication 3 MILLILITER(S): at 06:26

## 2021-10-31 RX ADMIN — Medication 3 MILLILITER(S): at 00:42

## 2021-10-31 RX ADMIN — Medication 3 MILLILITER(S): at 10:34

## 2021-10-31 RX ADMIN — Medication 50 MILLIGRAM(S): at 06:26

## 2021-10-31 RX ADMIN — PANTOPRAZOLE SODIUM 40 MILLIGRAM(S): 20 TABLET, DELAYED RELEASE ORAL at 06:27

## 2021-10-31 NOTE — DISCHARGE NOTE NURSING/CASE MANAGEMENT/SOCIAL WORK - PATIENT PORTAL LINK FT
You can access the FollowMyHealth Patient Portal offered by [facility] by registering at the following website: [email or web address]. By joining [employer]’s FollowMyHealth portal, you will also be able to view your health information using other applications (apps) compatible with our system.

## 2021-11-01 ENCOUNTER — TRANSCRIPTION ENCOUNTER (OUTPATIENT)
Age: 46
End: 2021-11-01

## 2021-11-03 ENCOUNTER — NON-APPOINTMENT (OUTPATIENT)
Age: 46
End: 2021-11-03

## 2021-11-03 ENCOUNTER — APPOINTMENT (OUTPATIENT)
Dept: PULMONOLOGY | Facility: CLINIC | Age: 46
End: 2021-11-03
Payer: COMMERCIAL

## 2021-11-03 VITALS
DIASTOLIC BLOOD PRESSURE: 84 MMHG | TEMPERATURE: 97.1 F | BODY MASS INDEX: 46.44 KG/M2 | HEIGHT: 64 IN | WEIGHT: 272 LBS | SYSTOLIC BLOOD PRESSURE: 132 MMHG | HEART RATE: 84 BPM | RESPIRATION RATE: 16 BRPM | OXYGEN SATURATION: 97 %

## 2021-11-03 DIAGNOSIS — J45.50 SEVERE PERSISTENT ASTHMA, UNCOMPLICATED: ICD-10-CM

## 2021-11-03 DIAGNOSIS — G47.30 SLEEP APNEA, UNSPECIFIED: ICD-10-CM

## 2021-11-03 DIAGNOSIS — R05.3 CHRONIC COUGH: ICD-10-CM

## 2021-11-03 PROCEDURE — 99214 OFFICE O/P EST MOD 30 MIN: CPT | Mod: 25

## 2021-11-03 PROCEDURE — 94010 BREATHING CAPACITY TEST: CPT

## 2021-11-03 RX ORDER — PREDNISONE 10 MG/1
10 TABLET ORAL
Qty: 18 | Refills: 0 | Status: ACTIVE | COMMUNITY
Start: 2021-11-03 | End: 1900-01-01

## 2021-11-03 RX ORDER — PREDNISONE 20 MG/1
20 TABLET ORAL
Qty: 6 | Refills: 0 | Status: ACTIVE | COMMUNITY
Start: 2021-11-03 | End: 1900-01-01

## 2021-11-03 RX ORDER — PROMETHAZINE HYDROCHLORIDE AND DEXTROMETHORPHAN HYDROBROMIDE ORAL SOLUTION 15; 6.25 MG/5ML; MG/5ML
6.25-15 SOLUTION ORAL
Qty: 80 | Refills: 0 | Status: ACTIVE | COMMUNITY
Start: 2021-11-03 | End: 1900-01-01

## 2021-11-03 RX ORDER — BECLOMETHASONE DIPROPIONATE HFA 80 UG/1
80 AEROSOL, METERED RESPIRATORY (INHALATION) TWICE DAILY
Qty: 3 | Refills: 1 | Status: ACTIVE | COMMUNITY
Start: 2019-01-09 | End: 1900-01-01

## 2021-11-03 RX ORDER — IPRATROPIUM BROMIDE AND ALBUTEROL SULFATE 2.5; .5 MG/3ML; MG/3ML
0.5-2.5 (3) SOLUTION RESPIRATORY (INHALATION)
Qty: 1 | Refills: 2 | Status: ACTIVE | COMMUNITY
Start: 2018-12-10 | End: 1900-01-01

## 2021-11-03 RX ORDER — PREDNISONE 10 MG/1
10 TABLET ORAL
Qty: 30 | Refills: 0 | Status: DISCONTINUED | COMMUNITY
Start: 2021-10-25 | End: 2021-11-03

## 2021-11-03 NOTE — ASSESSMENT
[FreeTextEntry1] : Ms. Edwards is a 46 year old female with a history of allergic rhinitis, severe persistent/steroid dependent asthma, asthmatic bronchitis, GERD, eosinophilia, nocturia, sleep apnea, SOB and vitamin D deficiency presenting to the office via video. Her chief concern is S/P 2nd Hospital admission for Asthma Exacerbation d/t (+) for entero/rhino virus via RVP on 10/23/21.\par \par 1. Asthma, severe persistent exacerbated state d/t recent diagnosis of Rhino/Entero virus:\par - Continue Prednisone taper: 40 mg x 3 days, 30 mg x 3 days, 20 mg x 3 days, 10 mg x 3 days.\par - Continue Duoneb Q4-6H\par - Continue Spiriva Respimat 2 inhales Q AM.\par - Continue QVar 2 puffs BID, rinse and gargle post use.\par \par 2. Persistent Cough:\par - Continue Benzonatate PO PRN for daytime\par - Continue Promethazine-DM PO 10mL QHS. \par \par 3. TEODORA: \par - I have discussed all the negative health consequences associated with obstructive and central sleep apnea including heart conditions/MI, hypertension, diabetes, chronic inflammation, memory issues, stroke, obesity, decreased libido, sleep related accidents, as well as anxiety and depression. Additional recommendations included: Avoid alcohol and sedatives at bedtime. Proper sleep hygiene such as maintaining a regular sleep routine, avoiding naps if possible, not watching TV or reading in bed,  and maintaining a quiet, comfortable bedroom. Sleepy driving avoidance and risks discussed with patient. Diet, exercise and weight loss suggested\par - Given flared state - CPAP use is difficult to tolerate.  Advised she can discontinue x 1 week and then restart nightly. \par - Patient tolerated without complaints pre-exacerbation. \par \par Patient provided with excuse note from 10/23-11/5/21; sent via e-mail. \par Patient to follow up with Dr. Elizabeth for visit and PFTs in 2/2022. \par Patient to call with further questions and concerns.\par Patient verbalizes understanding of care plan and is agreeable.\par \par

## 2021-11-03 NOTE — PHYSICAL EXAM
[No Acute Distress] : no acute distress [Normal Appearance] : normal appearance [No Neck Mass] : no neck mass [Normal Rate/Rhythm] : normal rate/rhythm [Normal S1, S2] : normal s1, s2 [No Murmurs] : no murmurs [No Resp Distress] : no resp distress [Clear to Auscultation Bilaterally] : clear to auscultation bilaterally [No Abnormalities] : no abnormalities [Normal Gait] : normal gait [No Clubbing] : no clubbing [No Cyanosis] : no cyanosis [No Edema] : no edema [FROM] : FROM [Normal Color/ Pigmentation] : normal color/ pigmentation [No Focal Deficits] : no focal deficits [Oriented x3] : oriented x3 [Normal Affect] : normal affect [TextBox_68] : Cough response elicited upon each deep inhalation.

## 2021-11-03 NOTE — REVIEW OF SYSTEMS
[Fatigue] : fatigue [Cough] : cough [Chest Tightness] : chest tightness [Dyspnea] : dyspnea [SOB on Exertion] : sob on exertion [Obesity] : obesity [Negative] : Psychiatric [Fever] : no fever [Chills] : no chills [Poor Appetite] : no poor appetite [Wheezing] : no wheezing

## 2021-11-03 NOTE — HISTORY OF PRESENT ILLNESS
[TextBox_4] : Ms. Edwards is a 46 year old female with a history of allergic rhinitis, severe persistent/steroid dependent asthma, asthmatic bronchitis, GERD, eosinophilia, nocturia, sleep apnea, SOB and vitamin D deficiency presenting to the office for a hospital follow up.\par \par Her chief concern is S/P 2nd Hospital admission for Asthma Exacerbation d/t (+) for entero/rhino virus via RVP on 10/23/21.\par \par 10/29/21 Video Visit:\par Her chief concern is wheezing, persistent SOB, & chest tightness x 1 week. \par \par Per noted obtained from [facility]: "HPI Objective Statement: 46 F pmh asthma (last hospitalization 2020- no intubations/ICU admissions), TEODORA on CPAP at night p/w cough and sob x 3 days. She reports dry cough progressively worsening w/ chest tightness and sob. She has been taking duoneb and symbicort with mild relief. SOB worse this morning. Pulmonologist (Dr. Elizabeth) told her to come to ED this morning, had covid test at outpt lab but did not yet get result. Also w/ low grade temp Thursday night, around 100 Denies headache, dizziness, fainting, chest pain, palpitations, abd pain, nvd, urinary sxs, leg pain/swelling, recent travel/immobilization, h/o dvt/pe, use of hormones, hemoptysis, trauma."\par Patient tested (+) for entero/rhino virus via RVP on 10/23/21.  She states she was on Prednisone 40 mg and then tapered to 30 mg PO.  She states she is on duoneb Q4H, QVar, Spiriva, Benzonatate TID, & Promethazine. She states nothing has relieved her symptoms of wheezing, persistent SOB, & chest tightness. She notes her normal peak flow is 500 and she has been 250 all week, without any improvement.  \par Patient notes she does not have pulse oximeter and is unsure of her SPO2. \par \par 11/3/21 UPDATE:\par \par Patient reported to hospital after 10/29/21 video visit.  She was admitted from 10/29-10/31/21.  Per provider discharge note obtained from [facility]: "Patient is a 45 y/o female with PMH chronic asthma (several exacerbation in past, no h/o intubation/ICU admission), TEODORA on CPAP, iron deficiency anemia, GERD, hiatal hernia presented with worsening shortness of breath and cough, was admitted for the management of acute asthma exacerbation.  Patient presented with worsening wheezing and cough. RVP positive for Rhinovirus. Recent admission 10/23, on admission RR 22, SpO2 98. Patient follows with pulmonologist, Dr. Elizabeth outpatient. Patient received the following medications in the ED: solumedrol x 125mg, Magnesium IV 2 mg, potassium tab x 2, albuterol nebulizer" Upon discharge patient was advised to continue with prednisone 50 for 5 more days & follow up w/ pulmonologist. \par \par Since discharge she notes wheezing has decreased.  She states SOB with minimal exertion persists.  She states she feels as though she is on an emotional roller coaster because of the high dose of steriods she has been on for the long length of time.\par \par She states difficulty sleeping at night due to cough and CPAP use. \par \par Patient notes work environment triggers worsening asthma.  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  She notes in the winter month cold air will also trigger her asthma, so she feels it's difficult between having to choose between adequate ventilation or keeping the room warm.  She states wearing a mask daily in this environment also contributes to frequent exacerbations. She notes she will be applying for reasonable accommodations. \par \par She denies fever/chills, decreased appetite, or N/V/D. \par \par \par \par

## 2021-11-03 NOTE — DISCUSSION/SUMMARY
[FreeTextEntry1] : 10/29/2021  CXR 1V Urgent; Frontal chest.\par COMPARISON: October 23, 2021.\par Impression: Heart, lungs, mediastinum and thorax are unremarkable.\par

## 2021-11-03 NOTE — REASON FOR VISIT
[Follow-Up - From Hospitalization] : a follow-up visit after a recent hospitalization [Asthma] : asthma [Obesity] : obesity [Cough] : cough [Shortness of Breath] : shortness of breath

## 2021-11-04 DIAGNOSIS — J45.51 SEVERE PERSISTENT ASTHMA WITH (ACUTE) EXACERBATION: ICD-10-CM

## 2021-11-04 DIAGNOSIS — B34.8 OTHER VIRAL INFECTIONS OF UNSPECIFIED SITE: ICD-10-CM

## 2021-11-04 DIAGNOSIS — E66.01 MORBID (SEVERE) OBESITY DUE TO EXCESS CALORIES: ICD-10-CM

## 2021-11-04 DIAGNOSIS — E03.9 HYPOTHYROIDISM, UNSPECIFIED: ICD-10-CM

## 2021-11-04 DIAGNOSIS — D50.9 IRON DEFICIENCY ANEMIA, UNSPECIFIED: ICD-10-CM

## 2021-11-04 DIAGNOSIS — D72.829 ELEVATED WHITE BLOOD CELL COUNT, UNSPECIFIED: ICD-10-CM

## 2021-11-04 DIAGNOSIS — K21.9 GASTRO-ESOPHAGEAL REFLUX DISEASE WITHOUT ESOPHAGITIS: ICD-10-CM

## 2021-11-04 DIAGNOSIS — G47.33 OBSTRUCTIVE SLEEP APNEA (ADULT) (PEDIATRIC): ICD-10-CM

## 2021-11-04 DIAGNOSIS — Z79.52 LONG TERM (CURRENT) USE OF SYSTEMIC STEROIDS: ICD-10-CM

## 2021-11-15 ENCOUNTER — TRANSCRIPTION ENCOUNTER (OUTPATIENT)
Age: 46
End: 2021-11-15

## 2022-01-13 ENCOUNTER — TRANSCRIPTION ENCOUNTER (OUTPATIENT)
Age: 47
End: 2022-01-13

## 2022-01-15 ENCOUNTER — TRANSCRIPTION ENCOUNTER (OUTPATIENT)
Age: 47
End: 2022-01-15

## 2022-01-20 ENCOUNTER — TRANSCRIPTION ENCOUNTER (OUTPATIENT)
Age: 47
End: 2022-01-20

## 2022-01-30 NOTE — H&P ADULT - NSHPREVIEWOFSYSTEMS_GEN_ALL_CORE
CONSTITUTIONAL: No weakness, fevers or chills  EYES/ENT: No visual changes, no throat pain   RESPIRATORY: No cough, wheezing, hemoptysis; No shortness of breath  CARDIOVASCULAR: No chest pain or palpitations  GASTROINTESTINAL: No abdominal, nausea, vomiting, or hematemesis; No diarrhea or constipation. No melena or hematochezia.  GENITOURINARY: No dysuria, frequency or hematuria  NEUROLOGICAL: No dizziness, numbness, or weakness  SKIN: No itching, burning, rashes, or lesions   ALLERGY: No seasonal allergies, no rhinorrhea  MUSCULOSKELETAL: No arthralgias, no myalgias
oral

## 2022-02-18 ENCOUNTER — TRANSCRIPTION ENCOUNTER (OUTPATIENT)
Age: 47
End: 2022-02-18

## 2022-02-23 ENCOUNTER — APPOINTMENT (OUTPATIENT)
Dept: PULMONOLOGY | Facility: CLINIC | Age: 47
End: 2022-02-23

## 2022-03-28 ENCOUNTER — RX RENEWAL (OUTPATIENT)
Age: 47
End: 2022-03-28

## 2022-04-11 PROBLEM — Z11.59 SCREENING FOR VIRAL DISEASE: Status: ACTIVE | Noted: 2021-10-22

## 2022-10-18 NOTE — DISCHARGE NOTE NURSING/CASE MANAGEMENT/SOCIAL WORK - NSCORESITESY/N_GEN_A_CORE_RD
Flu Questionnaire      1.  Does you have a moderate to severe fever?  []  Yes  [x]  No    2.  Have you had a serious reaction to a flu shot before?   []  Yes  [x]  No    3.  Have you ever had Guillian Middleton Syndrome within 6 weeks of a previous flu shot?     []  Yes   [x]  No    4.  Do you have a serious allergy to eggs?  []  Yes   [x]  No    5.  If person is answering for a child, is the child less that 6 months of age? []  Yes  [x] No      A \"YES\" answer to any question above means the patient is not eligible to receive the vaccine.  Vaccine Information Statement(s) given and reviewed, questions answered. Patient tolerated without incident.      No

## 2023-04-11 NOTE — ED PROVIDER NOTE - INTERNATIONAL TRAVEL
Provider E-Visit time total (minutes): 5 minutes    ASSESSMENT / PLAN:  (H66.90) Recurrent acute otitis media  (primary encounter diagnosis)  Comment:   Plan: Adult ENT  Referral               HPI:  I am confident that I am not pregnant    When did your symptoms first start?  1-2 days ago   Do you have a fever?  I feel feverish but cannot check my temperature   Do you have any of the following (choose all that apply)?  Runny Nose (Congestion)    Feeling very run down    Pain or pressure in your face   What color is your congestion (mucus)? Green or Yellow   Please describe what you experienced. I was camping in [place] and what seemed to start as allergies turned into extreme sinus and ear pain on my right side. I had yellow snot and bloody tinged mucus in my ear. I saw a doctor there and they prescribed an antibiotic. My question - when can I fly? When the pain is gone? Do i need to recheck the ear?        Thank you.   Have you had similar symptoms in the past? Yes, I have had similar symptoms before   Describe when you last had symptoms and what treatments have helped or not helped you in the past. Have had multiple sinus/ear infections in the last year.       
No

## 2023-04-21 NOTE — PROGRESS NOTE ADULT - PROBLEM SELECTOR PLAN 3
[FreeTextEntry1] : IMAGIN. MRI of the lumbar spine without contrast 18. The patient was unable to undergo the study with contrast due to her renal disease. She is found to have degenerative disc disease. Postoperative changes are noted from L3-S1. There is a focal right lateral disc herniation at L4-5 which extrudes inferiorly to the lateral recess of L5 compressing the L5 nerve root. There is moderate right foraminal stenosis noted. The findings at L4-5 appear to have progressed since her previous study in . The right-sided disc herniation at L3-4 has resorbed. There is severe left foraminal stenosis at L2-3 noted.  2. X-ray Lumbar spine from 2018 shows left lumbar curvature with multilevel disc degeneration and mild anterior slip L5 relative to S1. No instability.  EMG shows diffuse sensori-motor polyneuropathy in both lower extremities. cont home symbicort and ventolin inhaler

## 2023-08-10 NOTE — H&P ADULT - PROBLEM/PLAN-3
Goal Outcome Evaluation:     Patients leahy catheter removed at 0530 per night shift nurse. Patient voided only 60ml throughout my shift 7a-7p. Patient received 500ml bolus at 12pm and has been on continuous IV fluids as well as drinking oral liquids. Patient was bladder scanned showing 100ml of urine. Measures have been taken to promote urination such as ambulating to bathroom, running water, etc. Spoke with Michell CARLSON who gave orders for 1L bolus over 1 hour x1 dose and bladder scan patient q 4-6 hrs and post void. Patient does not appear to be in any acute distress and is resting in bed with eyes closed at this time. VSS and are as charted.                       DISPLAY PLAN FREE TEXT

## 2023-10-26 NOTE — PRE-ANESTHESIA EVALUATION ADULT - BMI (KG/M2)
Skilled Needs: Education and Wound Care   Caregiver involvement: Pt dependent with care from family due to [address] and inability to move   Medications reviewed and all medications are available in the home this visit. The following education was provided regarding medications:  DANIEL RITCHIE notified of any discrepancies/look a-like medications/medication interactions: DANIEL  Medications are effecrtive at this time. Home health supplies by type and quantity ordered/delivered this visit include:  Supplies available   Patient education provided this visit:  Reviewed to reported any redness, swelling, warmth, fever, chills, increased heart/respiratory rate, uncontrolled pain/tenderness, drainage:green/yellow/brown, or odor to MD immediately. Wound care completed to bilateral legs with the assistance of his caregiver. Minimal pain voiced. Pt due to see the urologist on monday for issues with new super pubic thrasher.    Sharps education provided: DANIEL  Patient level of understanding of education provided: Jonn Remy all understanding to above education  Skilled Care Performed this visit: Education and Wound Care  Patient response to procedure performed: Minimal pain during dressing change   Agency Progress toward goals: Progressing toward interventions above  Patient's Progress towards personal goals: when patient reaches goals and medication is managed, and disease processes are understood patient agrees and understand that discharge will take place 40.6

## 2023-12-03 NOTE — ASSESSMENT
ED TO INPATIENT SBAR HANDOFF    Patient Name: Arun Ge   :  1995  33 y.o. MRN:  [id number]  Preferred Name    ED Room #:  ED-0015/15  Family/Caregiver Present no   Restraints no   Sitter no   Sepsis Risk Score Sepsis Risk Score: 0.61    Situation  Code Status: No Order No additional code details. Allergies: Other and Metoclopramide  Weight: Patient Vitals for the past 96 hrs (Last 3 readings):   Weight   23 1348 99.8 kg (220 lb)   23 1254 99.8 kg (220 lb)     Arrived from: home  Chief Complaint:   Chief Complaint   Patient presents with    Emesis     N/v, was seen at [facility] and told she needs her gallbladder removed     Hospital Problem/Diagnosis:  Active Problems:    * No active hospital problems. *  Resolved Problems:    * No resolved hospital problems. *    Imaging:   CT ABDOMEN PELVIS W IV CONTRAST Additional Contrast? None   Final Result   No acute process identified involving the abdomen or pelvis. Normal   appearance of the appendix. Intermediate mesenteric lymph nodes which are likely reactive in nature. Additional findings noted above.            Abnormal labs:   Abnormal Labs Reviewed   URINALYSIS WITH REFLEX TO CULTURE - Abnormal; Notable for the following components:       Result Value    Color, UA LUIS (*)     Clarity, UA TURBID (*)     Bilirubin Urine SMALL (*)     Blood, Urine LARGE (*)     pH, UA 8.5 (*)     Protein,  (*)     Leukocyte Esterase, Urine MODERATE (*)     All other components within normal limits   MICROSCOPIC URINALYSIS - Abnormal; Notable for the following components:    Bacteria, UA Rare (*)     WBC, UA 14 (*)     Epithelial Cells, UA 20 (*)     All other components within normal limits     Critical values: no     Abnormal Assessment Findings:     Background  History:   Past Medical History:   Diagnosis Date    Hyperemesis        Assessment    Vitals/MEWS: MEWS Score: 1  Level of Consciousness: Alert (0)   Vitals:    23 1545 [FreeTextEntry1] : The plan for the patient is as follows:\par \par Problem 1: acute asthmatic bronchitis with cough/SOB\par -add doxycycline BID x 10 days\par -add prednisone taper 30 mg x 5, 20 mg x 5 and 10 mg x5 days. \par -continue Symbicort 160 (2 puffs BID) \par -add Budesonide via neb q 12 hours\par -sample of spiriva given to patient in case she needs it- she will call office. \par -has DuoNeb via Hand held nebulizer- not effective for her, can hold for now\par \par Problem 2: acute laryngitis\par -Breathe moist air. Use a humidifier to keep the air throughout your home or office moist. Inhale steam from a bowl of hot water or a hot shower.\par -Rest your voice as much as possible. \par -Drink plenty of fluids to prevent dehydration (avoid alcohol and caffeine).\par -Moisten your throat. Try sucking on lozenges, gargling with salt water or chewing a piece of gum. Add neb treatments of saline to moisten.\par -Avoid decongestants. These medications can dry out your throat. advised to DC astelin\par -Avoid whispering. This puts even more strain on your voice than normal speech does.\par \par problem 3: Sinus pressure/congestion and sinus HA\par -add fluticasone nasal spray 2 sprays each nostril\par -dc astelin\par \par problem 4: severe cough\par -add hycodan cough syrup 1-2 tsp Q 6  hours\par -cough potentiates the laryngitis, needs symptoms control\par \par Problem 5:  GERD-stable\par -continue Nexium 40 mg QD at breakfast \par \par Problem 6: OSAS\par -continue to use CPAP every night, benefiting well \par \par F/u in 3-4 months or as scheduled with Dr. Elizabeth.\par pt is encouraged to call or fax the office with any questions or concerns. \par She can call me with an update next week.

## 2024-04-03 NOTE — ED PROVIDER NOTE - NS ED ROS FT
None GENERAL: No chills, weight changes, nightsweats  EYES: no change in vision  HEENT: no dysplasia, odynophagia, ear pain, rhinorrhea, epistasis   CARDIAC: no palpitation   PULMONARY: no productive cough or SOB  GI: no abdominal pain, no nausea or no vomiting, no diarrhea or constipation  : No changes in urination for pain/freq.   SKIN: no rashes, abnormal bruising or bleeding  NEURO: no headache, numbness/tingling, extremity weakness   MSK: No joint pain

## 2024-07-13 NOTE — PROGRESS NOTE ADULT - PROBLEM SELECTOR PROBLEM 6
As per pt, She lives with Niece in a apartment building in 1st floor .Ramp to enter ,No stairs . She was independent with ADLs and functional mobility with Rollator. R/O Need for prophylactic measure

## 2024-12-10 NOTE — ED ADULT TRIAGE NOTE - ARRIVAL FROM
Anemia is likely due to acute blood loss which was from Gastritis . Most recent hemoglobin and hematocrit are listed below.  Recent Labs     12/10/24  1240   HGB 11.5*   HCT 34.5*     Plan  - Monitor serial CBC: Every 12 hours  - Transfuse PRBC if patient becomes hemodynamically unstable, symptomatic or H/H drops below 7/21.  - Patient has not received any PRBC transfusions to date  - Patient's anemia is currently stable   Home

## 2025-01-16 NOTE — DISCHARGE NOTE PROVIDER - CARE PROVIDER_API CALL
Please see the attached refill request.  
Rodrigue Elizabeth (MD)  Internal Medicine; Pulmonary Disease  [address], [address]  [place] [address]  Phone: [phone]  Fax: [phone]  Follow Up Time:

## 2025-03-18 NOTE — H&P PST ADULT - NSICDXPASTSURGICALHX_GEN_ALL_CORE_FT
[de-identified] : Patient is a 79-year-old female here for evaluation of right hip.  Patient was seen in the walk-in department on March 4, 2025.  Patient states a few days before the then she woke up with pain to her right hip, lower back area without any injury or trauma.  Patient has history of right hip replacement about 20 years ago.  Patient was prescribed ibuprofen 800 and states that she is only minimally improved.  Pain worsens with ambulation.  Denies numbness or tingling.  Patient uses cane for ambulation  Right hip exam: No effusion no ecchymosis no erythema no tense palpation good range of motion of hip with mild discomfort to groin and lower back area.  No gross instability neurovascular intact  Lumbar spine exam: No effusion no ecchymosis no erythema no tenderness palpation to midline.  Tenderness to right side paraspinal muscles, tenderness to right SI joint, mild decreased range of motion with pain to lower back positive straight leg raise right side.  Reflexes symmetric good distal pulses bilaterally  Reviewed right hip x-rays in detail from chart from 2 weeks ago showing no acute fractures consultations calcifications.  Right hip surgical hardware intact and position  X-ray lumbar spine 4 views: No acute fractures.  Noted anterolisthesis L4-L5, moderate degenerative changes of lumbar spine  Discussed with patient detail symptoms are consistent with lower back pain consistent with possible sciatica, mild pain to the right hip.  Discussed treatment options in detail.  Plan is for conservative treatment including physical therapy, rest, activity modification.  Medrol Dosepak sent to patient's pharmacy discussed side effects in detail.  Patient will stop taking ibuprofen for now can restart if needed after Medrol Dosepak.  Patient will call office symptoms worsen or change will follow-up in 4 to 6 weeks for reevaluation if still in pain.  Patient understands agrees with plan
PAST SURGICAL HISTORY:  S/P ovarian cystectomy

## 2025-04-22 NOTE — ED ADULT NURSE NOTE - ISOLATION TYPE:
"Post Op Check    04/21/2025    Maryann Riggs is a 63 year old female with h/o cholelithiasis, acute on chronic cholecystitis        Now POD#0 s/p .  4/19/2025 - 4/21/2025   Procedure(s):  Laparoscopic cholecystectomy     Patient reports pain rates currently a 9/10, patient is sitting up on the bed, reports pain on RLQ, worse with movement but tolerable. Denies SOB, chest pain, or dizziness. No BM. Voiding appropriately, patient just walked to the bathroom and voided.    BP (!) 158/81 (BP Location: Left arm)   Pulse 97   Temp 97.7  F (36.5  C) (Oral)   Resp 16   Ht 1.727 m (5' 8\")   Wt 68.5 kg (151 lb)   LMP 01/04/2014   SpO2 97%   BMI 22.96 kg/m        Intake/Output Summary (Last 24 hours) at 4/21/2025 2054  Last data filed at 4/21/2025 2000  Gross per 24 hour   Intake 4338.33 ml   Output 75 ml   Net 4263.33 ml       ROUTINE IP LABS (Last four results)  BMP  Recent Labs   Lab 04/21/25  1127 04/21/25  0633 04/20/25  1828 04/20/25  0850 04/19/25  0803 04/18/25 2315   NA  --  137  --  135 132* 130*   POTASSIUM  --  3.5 3.5 3.2* 3.8 3.6   CHLORIDE  --  104  --  100 96* 89*   ARIC  --  8.1*  --  8.6* 8.5* 9.6   CO2  --  24  --  24 25 28   BUN  --  7.7*  --  15.6 10.5 12.8   CR  --  0.61  --  0.64 0.56 0.59   * 128*  --  101* 211* 265*     CBC  Recent Labs   Lab 04/21/25  0633 04/20/25  0850 04/19/25  0803 04/18/25  2315   WBC 10.8 18.4* 34.5* 18.8*   RBC 3.56* 3.95 4.11 4.86   HGB 10.1* 11.5* 12.2 14.1   HCT 31.1* 34.4* 36.3 42.7   MCV 87 87 88 88   MCH 28.4 29.1 29.7 29.0   MCHC 32.5 33.4 33.6 33.0   RDW 13.2 13.2 12.8 12.4    227 235 311     INR  Recent Labs   Lab 04/19/25  1152   INR 0.9        Gen: A&O x3, NAD  Chest: breathing non-labored  Abdomen: soft, RLQ regina, non-distended  Incision: clean, dry, intact  Extremities: warm and well perfused      A/P: No acute post-op issues. Continue plan of care per primary team. Please call with any questions.  #Post-op pain  -Continue current " management, added oxycodone range 5-10 mg, use oxycodone first before IV dilaudid if possible. Dilaudid for breakthrough pain only. Also use PRN flexeril.   GI  - Diet: Regular diet  - PRN antiemetics: Continue current management  - Bowel Regimen: Continue current management  DVT prophylaxis:  SCDs  Lines: PIV  mIVF: NS 100ml/hr      AFarideh Sandy, DO [facility]  General Surgery PGY1    None

## 2025-06-20 NOTE — H&P PST ADULT - MUSCULOSKELETAL
Detail Level: Simple Detail Level: Detailed No joint pain, swelling or deformity; no limitation of movement details…